# Patient Record
Sex: FEMALE | Race: WHITE | Employment: OTHER | URBAN - METROPOLITAN AREA
[De-identification: names, ages, dates, MRNs, and addresses within clinical notes are randomized per-mention and may not be internally consistent; named-entity substitution may affect disease eponyms.]

---

## 2017-03-13 ENCOUNTER — TRANSFERRED RECORDS (OUTPATIENT)
Dept: HEALTH INFORMATION MANAGEMENT | Facility: CLINIC | Age: 77
End: 2017-03-13

## 2017-03-13 LAB
CREAT SERPL-MCNC: 1.84 MG/DL (ref 0.57–1.11)
GFR SERPL CREATININE-BSD FRML MDRD: 27 ML/MIN/1.73M2
GLUCOSE SERPL-MCNC: 109 MG/DL (ref 70–95)
POTASSIUM SERPL-SCNC: 4.7 MMOL/L (ref 3.5–5.1)

## 2017-05-02 DIAGNOSIS — E78.5 HYPERLIPIDEMIA LDL GOAL <100: ICD-10-CM

## 2017-05-03 RX ORDER — SIMVASTATIN 40 MG
TABLET ORAL
Qty: 90 TABLET | Refills: 3 | Status: SHIPPED | OUTPATIENT
Start: 2017-05-03 | End: 2018-06-24

## 2017-05-31 DIAGNOSIS — I10 HYPERTENSION GOAL BP (BLOOD PRESSURE) < 140/90: ICD-10-CM

## 2017-05-31 RX ORDER — METOPROLOL SUCCINATE 25 MG/1
TABLET, EXTENDED RELEASE ORAL
Qty: 90 TABLET | Refills: 0 | Status: SHIPPED | OUTPATIENT
Start: 2017-05-31 | End: 2017-10-09

## 2017-05-31 NOTE — TELEPHONE ENCOUNTER
metoprolol (TOPROL-XL) 25 MG 24 hr tablet      Last Written Prescription Date: 8/1/16  Last Fill Quantity: 90, # refills: 3    Last Office Visit with [facility], [facility] or [facility] prescribing provider:  8/30/16   Future Office Visit:    Next 5 appointments (look out 90 days)     Jul 11, 2017 11:00 AM CDT   [facility]YS[facility] with [facility]   [facility]    [address] [place] [id number]   [phone]                    BP Readings from Last 3 Encounters:   08/30/16 122/68   06/28/16 134/72   06/23/15 128/68

## 2017-06-07 DIAGNOSIS — E03.9 HYPOTHYROIDISM, UNSPECIFIED TYPE: ICD-10-CM

## 2017-06-08 RX ORDER — LEVOTHYROXINE SODIUM 125 UG/1
TABLET ORAL
Qty: 85 TABLET | Refills: 0 | Status: SHIPPED | OUTPATIENT
Start: 2017-06-08 | End: 2017-10-16

## 2017-06-08 NOTE — TELEPHONE ENCOUNTER
levothyroxine (SYNTHROID, LEVOTHROID) 125 MCG tablet     Last Written Prescription Date: 9/6/16  Last Quantity: 90, # refills: 3  Last Office Visit with [facility], [facility] or [facility] prescribing provider: 8/30/16   Next 5 appointments (look out 90 days)     Jul 11, 2017 11:00 AM CDT   [facility]   [facility]    [address] [place] [id number] [phone]                   TSH   Date Value Ref Range Status   08/30/2016 1.06 0.40 - 5.00 mU/L Final

## 2017-06-13 DIAGNOSIS — G89.4 CHRONIC PAIN SYNDROME: Primary | ICD-10-CM

## 2017-06-13 NOTE — TELEPHONE ENCOUNTER
Tizanidine 2 mg      Last Written Prescription Date:  8/30/16  Last Fill Quantity: 360,   # refills: 3  Last Office Visit with [facility], [facility] or [facility] prescribing provider: 8/30/16  Future Office visit:    Next 5 appointments (look out 90 days)     Jul 11, 2017 11:00 AM CDT   [facility]    [address] [place] [id number] [phone]                   Routing refill request to provider for review/approval because:  Drug not on the [facility], [facility] or [facility] refill protocol or controlled substance

## 2017-06-14 RX ORDER — TIZANIDINE 2 MG/1
TABLET ORAL
Qty: 360 TABLET | Refills: 3 | Status: SHIPPED | OUTPATIENT
Start: 2017-06-14 | End: 2018-08-21

## 2017-07-12 ENCOUNTER — OFFICE VISIT (OUTPATIENT)
Dept: FAMILY MEDICINE | Facility: CLINIC | Age: 77
End: 2017-07-12
Payer: MEDICARE

## 2017-07-12 VITALS
RESPIRATION RATE: 20 BRPM | BODY MASS INDEX: 29.25 KG/M2 | HEART RATE: 80 BPM | TEMPERATURE: 97.8 F | HEIGHT: 66 IN | SYSTOLIC BLOOD PRESSURE: 124 MMHG | DIASTOLIC BLOOD PRESSURE: 62 MMHG | WEIGHT: 182 LBS | OXYGEN SATURATION: 100 %

## 2017-07-12 DIAGNOSIS — R42 LIGHTHEADEDNESS: ICD-10-CM

## 2017-07-12 DIAGNOSIS — E78.5 HYPERLIPIDEMIA LDL GOAL <100: ICD-10-CM

## 2017-07-12 DIAGNOSIS — M47.812 CERVICAL SPINE ARTHRITIS: ICD-10-CM

## 2017-07-12 DIAGNOSIS — B02.9 HERPES ZOSTER WITHOUT COMPLICATION: ICD-10-CM

## 2017-07-12 DIAGNOSIS — M10.9 PODAGRA: ICD-10-CM

## 2017-07-12 DIAGNOSIS — I10 HYPERTENSION GOAL BP (BLOOD PRESSURE) < 140/90: ICD-10-CM

## 2017-07-12 DIAGNOSIS — E03.8 OTHER SPECIFIED HYPOTHYROIDISM: ICD-10-CM

## 2017-07-12 DIAGNOSIS — J43.8 OTHER EMPHYSEMA (H): ICD-10-CM

## 2017-07-12 DIAGNOSIS — Z00.00 ROUTINE GENERAL MEDICAL EXAMINATION AT A HEALTH CARE FACILITY: Primary | ICD-10-CM

## 2017-07-12 DIAGNOSIS — N18.30 CHRONIC RENAL INSUFFICIENCY, STAGE 3 (MODERATE) (H): ICD-10-CM

## 2017-07-12 DIAGNOSIS — L02.512 ABSCESS OF LEFT THUMB: ICD-10-CM

## 2017-07-12 LAB
BASOPHILS # BLD AUTO: 0 10E9/L (ref 0–0.2)
BASOPHILS NFR BLD AUTO: 0.4 %
DIFFERENTIAL METHOD BLD: NORMAL
EOSINOPHIL # BLD AUTO: 0.4 10E9/L (ref 0–0.7)
EOSINOPHIL NFR BLD AUTO: 5.1 %
ERYTHROCYTE [DISTWIDTH] IN BLOOD BY AUTOMATED COUNT: 12.9 % (ref 10–15)
GRAM STN SPEC: NORMAL
HCT VFR BLD AUTO: 41.3 % (ref 35–47)
HGB BLD-MCNC: 13.8 G/DL (ref 11.7–15.7)
LYMPHOCYTES # BLD AUTO: 1.5 10E9/L (ref 0.8–5.3)
LYMPHOCYTES NFR BLD AUTO: 21.7 %
Lab: NORMAL
MCH RBC QN AUTO: 32.4 PG (ref 26.5–33)
MCHC RBC AUTO-ENTMCNC: 33.4 G/DL (ref 31.5–36.5)
MCV RBC AUTO: 97 FL (ref 78–100)
MICRO REPORT STATUS: NORMAL
MONOCYTES # BLD AUTO: 0.7 10E9/L (ref 0–1.3)
MONOCYTES NFR BLD AUTO: 9.7 %
NEUTROPHILS # BLD AUTO: 4.3 10E9/L (ref 1.6–8.3)
NEUTROPHILS NFR BLD AUTO: 63.1 %
PLATELET # BLD AUTO: 182 10E9/L (ref 150–450)
RBC # BLD AUTO: 4.26 10E12/L (ref 3.8–5.2)
SPECIMEN SOURCE: NORMAL
WBC # BLD AUTO: 6.8 10E9/L (ref 4–11)

## 2017-07-12 PROCEDURE — 87070 CULTURE OTHR SPECIMN AEROBIC: CPT | Performed by: INTERNAL MEDICINE

## 2017-07-12 PROCEDURE — 80050 GENERAL HEALTH PANEL: CPT | Performed by: INTERNAL MEDICINE

## 2017-07-12 PROCEDURE — 87205 SMEAR GRAM STAIN: CPT | Performed by: INTERNAL MEDICINE

## 2017-07-12 PROCEDURE — 80061 LIPID PANEL: CPT | Performed by: INTERNAL MEDICINE

## 2017-07-12 PROCEDURE — 99213 OFFICE O/P EST LOW 20 MIN: CPT | Mod: 25 | Performed by: INTERNAL MEDICINE

## 2017-07-12 PROCEDURE — 87077 CULTURE AEROBIC IDENTIFY: CPT | Performed by: INTERNAL MEDICINE

## 2017-07-12 PROCEDURE — 36415 COLL VENOUS BLD VENIPUNCTURE: CPT | Performed by: INTERNAL MEDICINE

## 2017-07-12 PROCEDURE — 99397 PER PM REEVAL EST PAT 65+ YR: CPT | Performed by: INTERNAL MEDICINE

## 2017-07-12 RX ORDER — CEPHALEXIN 500 MG/1
500 CAPSULE ORAL 2 TIMES DAILY
Qty: 14 CAPSULE | Refills: 0 | Status: SHIPPED | OUTPATIENT
Start: 2017-07-12 | End: 2018-08-21

## 2017-07-12 RX ORDER — FAMCICLOVIR 500 MG/1
500 TABLET ORAL 2 TIMES DAILY
Qty: 14 TABLET | Refills: 3 | Status: SHIPPED | OUTPATIENT
Start: 2017-07-12 | End: 2018-01-24

## 2017-07-12 RX ORDER — ALBUTEROL SULFATE 90 UG/1
2 AEROSOL, METERED RESPIRATORY (INHALATION) EVERY 6 HOURS PRN
Qty: 1 INHALER | Refills: 5 | Status: SHIPPED | OUTPATIENT
Start: 2017-07-12 | End: 2018-08-21

## 2017-07-12 RX ORDER — PREDNISONE 20 MG/1
20 TABLET ORAL 2 TIMES DAILY
Qty: 10 TABLET | Refills: 3 | Status: SHIPPED | OUTPATIENT
Start: 2017-07-12 | End: 2018-05-08

## 2017-07-12 NOTE — PROGRESS NOTES
Answers for HPI/ROS submitted by the patient on 7/9/2017   Annual Exam:  Getting at least 3 servings of Calcium per day:: NO  Bi-annual eye exam:: Yes  Dental care twice a year:: NO  Sleep apnea or symptoms of sleep apnea:: None  Diet:: Low salt  Frequency of exercise:: 2-3 days/week  Taking medications regularly:: Yes  Medication side effects:: None  Additional concerns today:: YES  PHQ-2 Score: 1  Duration of exercise:: Less than 15 minutes      SUBJECTIVE:   Marina Cummins is a 77 year old female who presents for Preventive Visit.      Are you in the first 12 months of your Medicare Part B coverage?  No      COGNITIVE SCREEN  1) Repeat 3 items (Banana, Sunrise, Chair)    2) Clock draw: NORMAL  3) 3 item recall: Recalls 3 objects  Results: 3 items recalled: COGNITIVE IMPAIRMENT LESS LIKELY    Mini-CogTM Copyright S Yared. Licensed by the author for use in [facility]; reprinted with permission (jon@[facility]). All rights reserved.            Has multiple medication questions, mo. Famvir med. Needs written refills today on some meds as well.                       Living full time in [place] again.                Busy;active.                Had a sliver L thumb a few wks ago; wood deck.              She developed an abscess which drained spontaneously.         Not there is a recurrent area of swelling and some pain.                                                                                                                                                                                                  occas lightheadedness even while sitting.         Not orthostatic or vertiginous.              Not assoc with low BP.        Does take tizanidine. Has ongoing neck pain.                             Some weakness of legs with walking; more than claudication.           She sees renal yearly.                                Reviewed and updated as needed this visit by clinical staff  Tobacco   Allergies  Meds  Med Hx  Surg Hx  Fam Hx  Soc Hx        Reviewed and updated as needed this visit by Provider            The patient does not drink >3 drinks per day nor >7 drinks per week.    Today's PHQ-2 Score:   PHQ-2 ( 1999 [employer]) 7/12/2017 7/9/2017   Q1: Little interest or pleasure in doing things 0 0   Q2: Feeling down, depressed or hopeless 0 1   PHQ-2 Score 0 1   Q1: Little interest or pleasure in doing things - Not at all   Q2: Feeling down, depressed or hopeless - Several days   PHQ-2 Score - 1       Do you feel safe in your environment - Yes    Do you have a Health Care Directive?: Yes: Patient states has Advance Directive and will bring in a copy to clinic.    Current providers sharing in care for this patient include:   Patient Care Team:  Cody Obando MD as PCP - General (Internal Medicine)      Hearing impairment: No    Ability to successfully perform activities of daily living: Yes, no assistance needed     Fall risk:  Fallen 2 or more times in the past year?: No  Any fall with injury in the past year?: No    Home safety:  none identified      The following health maintenance items are reviewed in Epic and correct as of today:  Health Maintenance   Topic Date Due     CARLEE QUESTIONNAIRE 1 YEAR  1940     PHQ-9 Q1YR  1940     URINE DRUG SCREEN Q1 YR  06/22/1953     MEDICARE ANNUAL WELLNESS VISIT  06/22/1956     FALL RISK ASSESSMENT  06/28/2017     INFLUENZA VACCINE (SYSTEM ASSIGNED)  09/01/2017     LIPID SCREEN Q5 YR FEMALE (SYSTEM ASSIGNED)  06/28/2021     ADVANCE DIRECTIVE PLANNING Q5 YRS  06/28/2021     TETANUS IMMUNIZATION (SYSTEM ASSIGNED)  06/23/2025     DEXA SCAN SCREENING (SYSTEM ASSIGNED)  Completed     PNEUMOCOCCAL  Completed     Patient Active Problem List    Diagnosis Date Noted     Personal history of tobacco use, presenting hazards to health 06/26/2016     Priority: High     Quit in 19__;            Pack yrs?       Hypertension goal BP (blood pressure) < 140/90  "01/09/2015     Priority: High     Chronic renal insufficiency 06/17/2014     Priority: High     Peripheral vascular disease (H) 05/15/2013     Priority: High     MELANIE 1.1 bilat in 6/13; stable  Problem list name updated by automated process. Provider to review       Hypothyroidism 05/15/2013     Priority: High     Hyperlipidemia LDL goal <100 05/15/2013     Priority: High     Cor angio approx 11/09; \"minimal disease\"      Lipids were good at renal; see 6/14 note       Chronic pain syndrome 05/17/2016     Priority: Medium     Podagra 07/14/2015     Priority: Medium     Hand joint pain 04/07/2015     Priority: Medium     Bee sting reaction 06/26/2014     Priority: Medium     Osteopenia 06/17/2014     Priority: Medium     Mild; in 2009, T-1.6 R, -0.4 spine       Back pain 12/31/2012     Priority: Medium     COPD (chronic obstructive pulmonary disease) (H) 06/06/2012     Priority: Medium     [facility]       ACP (advance care planning) 06/28/2016     Priority: Low     Advance Care Planning 6/28/2016: Receipt of ACP document:  Received: Health Care Directive which was witnessed or notarized on 11/27/11.  Document not previously scanned.  Validation form completed and sent with document to be scanned.  Code Status needs to be updated to reflect choices in most recent ACP document. Notification sent to Dr. Cody Obando  for followup.  Confirmed/documented designated decision maker(s).  Added by Nevaeh Mckinnon             Preventive measure 05/15/2013     Priority: Low     [facility]  Colonoscopy approx 2005; pt declines another         S/p bilateral mastectomy; s/p hysterectomy         Past Surgical History:   Procedure Laterality Date     AORTO BI ILIAC BYPASS Bilateral 1999     APPENDECTOMY       CHOLECYSTECTOMY       HYSTERECTOMY, PAP NO LONGER INDICATED  1976    no oophorectomy     JOINT REPLACEMENT, HIP RT/LT Bilateral     Joint Replacement Hip RT/LT     MASTECTOMY, " "SIMPLE RT/LT/DARLENE Bilateral 1974    fibrocystic disease     SPINE SURGERY  1972,1982     THYROIDECTOMY       goiter     Social History     Social History     Marital status:      Spouse name:  4/14     Number of children: 2     Years of education: N/A     Occupational History      Retired     Social History Main Topics     Smoking status: Former Smoker     Quit date: 5/15/1998     Smokeless tobacco: Never Used     Alcohol use Yes      Comment: occ.     Drug use: No     Sexual activity: No     Other Topics Concern     Parent/Sibling W/ Cabg, Mi Or Angioplasty Before 65f 55m? Yes     Social History Narrative    Lives in [place] full time since the 1990's     Family History   Problem Relation Age of Onset     DIABETES Paternal Grandmother      DIABETES Sister      DIABETES Other          BP Readings from Last 3 Encounters:   07/12/17 124/62   08/30/16 122/68   06/28/16 134/72    Wt Readings from Last 3 Encounters:   07/12/17 182 lb (82.6 kg)   08/30/16 185 lb (83.9 kg)   06/28/16 185 lb (83.9 kg)                  Current Outpatient Prescriptions   Medication Sig Dispense Refill     tiZANidine (ZANAFLEX) 2 MG tablet TAKE 1 TABLET THREE TIMES DAILY AND AN EXTRA DOSE AT BEDTIME 360 tablet 3     levothyroxine (SYNTHROID/LEVOTHROID) 125 MCG tablet TAKE 1 TABLET  6  DAYS  A  WEEK  AND TAKE 1/2 TABLET  ON  SEVENTH DAY 85 tablet 0     clopidogrel (PLAVIX) 75 MG tablet TAKE 1 TABLET EVERY DAY 90 tablet 2     metoprolol (TOPROL-XL) 25 MG 24 hr tablet TAKE 1 TABLET EVERY DAY 90 tablet 0     simvastatin (ZOCOR) 40 MG tablet TAKE 1 TABLET EVERY DAY 90 tablet 3     spironolactone (ALDACTONE) 25 MG tablet Take 25 mg by mouth daily  90 tablet 3     predniSONE (DELTASONE) 20 MG tablet Take 1 tablet (20 mg) by mouth 2 times daily Only takes when \"flare-up\" with Gout 10 tablet 3     diclofenac (VOLTAREN) 1 % GEL Apply 2 grams to hands four times daily prn using enclosed dosing card. 100 g 5     desonide (DESOWEN) 0.05 % " "cream        ketoconazole (NIZORAL) 2 % cream        furosemide (LASIX) 20 MG tablet 40 mg AM, 20 mg  tablet 1     albuterol (PROAIR HFA, PROVENTIL HFA, VENTOLIN HFA) 108 (90 BASE) MCG/ACT inhaler Inhale 2 puffs into the lungs every 6 hours as needed for shortness of breath / dyspnea or wheezing 1 Inhaler 0     famciclovir (FAMVIR) 500 MG tablet Take 1 tablet (500 mg) by mouth 2 times daily 180 tablet 3     EPINEPHrine (EPIPEN) 0.3 MG/0.3ML injection Inject 0.3 mLs (0.3 mg) into the muscle once as needed for anaphylaxis 1 each 11     multivitamin  with lutein (OCUVITE WITH LTEIN) CAPS Take 1 capsule by mouth daily       calcitRIOL (ROCALTROL) 0.25 MCG capsule Take 1 capsule by mouth every other day. 90 capsule 1     Allergies   Allergen Reactions     Aspirin      Gabapentin      Confusion     Sulfa Drugs      Tramadol      Nausea and Vomiting       Zithromax [Azithromycin Dihydrate]      Diarrhea.      Zovirax      Mood swings. Suicidal.           ROS:  C: NEGATIVE for fever, chills, change in weight  E: NEGATIVE for vision changes or irritation  E/M: NEGATIVE for ear, mouth and throat problems  R: NEGATIVE for significant cough or SOB  CV: NEGATIVE for chest pain/chest pressure and palpitations  GI: NEGATIVE for hematochezia and melena  : NEGATIVE for frequency, dysuria, or hematuria  MUSCULOSKELETAL:POSITIVE  for neck pain  NEURO: NEGATIVE for vertigo  H: NEGATIVE for bleeding problems  PSYCHIATRIC: POSITIVE for occas,brief depressed mood    OBJECTIVE:   /62 (BP Location: Left arm, Patient Position: Chair, Cuff Size: Adult Large)  Pulse 80  Temp 97.8  F (36.6  C)  Resp 20  Ht 5' 5.5\" (1.664 m)  Wt 182 lb (82.6 kg)  SpO2 100%  Breastfeeding? No  BMI 29.83 kg/m2 Estimated body mass index is 29.83 kg/(m^2) as calculated from the following:    Height as of this encounter: 5' 5.5\" (1.664 m).    Weight as of this encounter: 182 lb (82.6 kg).  EXAM:   GENERAL APPEARANCE: healthy, alert and no " "distress  EYES: Eyes grossly normal to inspection  HENT: ear canals and TM's normal, nose and mouth without ulcers or lesions, oropharynx clear and oral mucous membranes moist  NECK: no adenopathy, no asymmetry, masses, or scars and thyroid normal to palpation  RESP: lungs clear to auscultation - no rales, rhonchi or wheezes  BREAST: both breasts are absent  CV: regular rates and rhythm, normal S1 S2, no S3 or S4, no murmur, click or rub and varicosities with trace edema  ABDOMEN: soft, nontender, no hepatosplenomegaly and no masses  MS: neck exam reveals decr ROM  SKIN: abscess flexor mid thumb; incised and drained; fluid sent for culture  NEURO: Normal strength and tone, sensory exam grossly normal, mentation intact and speech normal  PSYCH: mentation appears normal and affect normal/bright    ASSESSMENT / PLAN:   Marina was seen today for physical.    Diagnoses and all orders for this visit:    Routine general medical examination at a health care facility    Abscess of left thumb  -     cephALEXin (KEFLEX) 500 MG capsule; Take 1 capsule (500 mg) by mouth 2 times daily  -     Wound Culture Aerobic Bacterial  -     Gram stain  -     CBC with platelets and differential    Other specified hypothyroidism  -     TSH with free T4 reflex  -     CBC with platelets and differential    Hyperlipidemia LDL goal <100  -     Comprehensive metabolic panel (BMP + Alb, Alk Phos, ALT, AST, Total. Bili, TP)  -     Lipid Profile (Chol, Trig, HDL, LDL calc)    Hypertension goal BP (blood pressure) < 140/90  -     Comprehensive metabolic panel (BMP + Alb, Alk Phos, ALT, AST, Total. Bili, TP)    Chronic renal insufficiency, stage 3 (moderate)    Lightheadedness    Podagra  -     predniSONE (DELTASONE) 20 MG tablet; Take 1 tablet (20 mg) by mouth 2 times daily Only takes when \"flare-up\" with Gout    Cervical spine arthritis (H)    Other emphysema (H)  -     albuterol (PROAIR HFA/PROVENTIL HFA/VENTOLIN HFA) 108 (90 BASE) MCG/ACT Inhaler; " "Inhale 2 puffs into the lungs every 6 hours as needed for shortness of breath / dyspnea or wheezing    Herpes zoster without complication  -     famciclovir (FAMVIR) 500 MG tablet; Take 1 tablet (500 mg) by mouth 2 times daily                  Summary and implications:  Multiple issues.           Adjust antibiotics based on culture results.  If this abscess recurs, see a surgeon for exploration.                      Patient Instructions     I will mail to you the lab results.                            Try not taking the morning tizanidine; perhaps this is contributing to lightheadedness.                              End of Life Planning:  Patient currently has an advanced directive: Yes.  Practitioner is supportive of decision.    COUNSELING:  Reviewed preventive health counseling, as reflected in patient instructions        Estimated body mass index is 29.83 kg/(m^2) as calculated from the following:    Height as of this encounter: 5' 5.5\" (1.664 m).    Weight as of this encounter: 182 lb (82.6 kg).     reports that she quit smoking about 19 years ago. She has never used smokeless tobacco.      Appropriate preventive services were discussed with this patient, including applicable screening as appropriate for cardiovascular disease, diabetes, osteopenia/osteoporosis, and glaucoma.  As appropriate for age/gender, discussed screening for colorectal cancer, prostate cancer, breast cancer, and cervical cancer. Checklist reviewing preventive services available has been given to the patient.    Reviewed patients plan of care and provided an AVS. The Basic Care Plan (routine screening as documented in Health Maintenance) for Marina meets the Care Plan requirement. This Care Plan has been established and reviewed with the Patient.    Counseling Resources:  ATP IV Guidelines  Pooled Cohorts Equation Calculator  Breast Cancer Risk Calculator  FRAX Risk Assessment  ICSI Preventive Guidelines  Dietary Guidelines for Americans, " 2010  [employer]'s MyPlate  ASA Prophylaxis  Lung CA Screening    Cody Obando MD  [facility]   Results for orders placed or performed in visit on 07/12/17   Comprehensive metabolic panel (BMP + Alb, Alk Phos, ALT, AST, Total. Bili, TP)   Result Value Ref Range    Sodium 140 133 - 144 mmol/L    Potassium 3.8 3.4 - 5.3 mmol/L    Chloride 103 94 - 109 mmol/L    Carbon Dioxide 23 20 - 32 mmol/L    Anion Gap 14 3 - 14 mmol/L    Glucose 102 (H) 70 - 99 mg/dL    Urea Nitrogen 54 (H) 7 - 30 mg/dL    Creatinine 2.03 (H) 0.52 - 1.04 mg/dL    GFR Estimate 24 (L) >60 mL/min/1.7m2    GFR Estimate If Black 29 (L) >60 mL/min/1.7m2    Calcium 9.2 8.5 - 10.1 mg/dL    Bilirubin Total 0.6 0.2 - 1.3 mg/dL    Albumin 4.4 3.4 - 5.0 g/dL    Protein Total 8.0 6.8 - 8.8 g/dL    Alkaline Phosphatase 96 40 - 150 U/L    ALT 23 0 - 50 U/L    AST 20 0 - 45 U/L   TSH with free T4 reflex   Result Value Ref Range    TSH 2.39 0.40 - 4.00 mU/L   CBC with platelets and differential   Result Value Ref Range    WBC 6.8 4.0 - 11.0 10e9/L    RBC Count 4.26 3.8 - 5.2 10e12/L    Hemoglobin 13.8 11.7 - 15.7 g/dL    Hematocrit 41.3 35.0 - 47.0 %    MCV 97 78 - 100 fl    MCH 32.4 26.5 - 33.0 pg    MCHC 33.4 31.5 - 36.5 g/dL    RDW 12.9 10.0 - 15.0 %    Platelet Count 182 150 - 450 10e9/L    Diff Method Automated Method     % Neutrophils 63.1 %    % Lymphocytes 21.7 %    % Monocytes 9.7 %    % Eosinophils 5.1 %    % Basophils 0.4 %    Absolute Neutrophil 4.3 1.6 - 8.3 10e9/L    Absolute Lymphocytes 1.5 0.8 - 5.3 10e9/L    Absolute Monocytes 0.7 0.0 - 1.3 10e9/L    Absolute Eosinophils 0.4 0.0 - 0.7 10e9/L    Absolute Basophils 0.0 0.0 - 0.2 10e9/L   Lipid Profile (Chol, Trig, HDL, LDL calc)   Result Value Ref Range    Cholesterol 165 <200 mg/dL    Triglycerides 145 <150 mg/dL    HDL Cholesterol 50 >49 mg/dL    LDL Cholesterol Calculated 86 <100 mg/dL    Non HDL Cholesterol 115 <130 mg/dL   Wound Culture Aerobic Bacterial   Result  Value Ref Range    Specimen Description Wound Left thumb     Special Requests Specimen collected in eSwab transport (blue cap)     Culture Micro Culture in progress     Micro Report Status Pending    Gram stain   Result Value Ref Range    Specimen Description Wound Left Thumb     Special Requests Specimen collected in eSwab transport (blue cap)     Gram Stain       No organisms seen  Moderate WBC'S seen predominantly PMN's      Micro Report Status FINAL 07/12/2017      My chart message sent.       Your lab results are stable. Keep taking the same medications.

## 2017-07-12 NOTE — MR AVS SNAPSHOT
After Visit Summary   7/12/2017    Marina Cummins    MRN: [id number]           Patient Information     Date Of Birth          1940        Visit Information        Provider Department      7/12/2017 11:30 AM Cody Obando MD [facility]        Today's Diagnoses     Routine general medical examination at a health care facility    -  1    Abscess of left thumb        Other specified hypothyroidism        Hyperlipidemia LDL goal <100        Hypertension goal BP (blood pressure) < 140/90        Chronic renal insufficiency, stage 3 (moderate)        Lightheadedness        Podagra        Cervical spine arthritis (H)        Other emphysema (H)        Herpes zoster without complication          Care Instructions      I will mail to you the lab results.                            Try not taking the morning tizanidine; perhaps this is contributing to lightheadedness.                                Follow-ups after your visit        Who to contact     If you have questions or need follow up information about today's clinic visit or your schedule please contact [facility] directly at [phone].  Normal or non-critical lab and imaging results will be communicated to you by [employer], letter or phone within 4 business days after the clinic has received the results. If you do not hear from us within 7 days, please contact the clinic through [employer] or phone. If you have a critical or abnormal lab result, we will notify you by phone as soon as possible.  Submit refill requests through [employer]life or call your pharmacy and they will forward the refill request to us. Please allow 3 business days for your refill to be completed.          Additional Information About Your Visit        MyChart Information     Daylife gives you secure access to your electronic health record. If you see a primary care provider, you can also send messages to your care  "team and make appointments. If you have questions, please call your primary care clinic.  If you do not have a primary care provider, please call [phone] and they will assist you.        Care EveryWhere ID     This is your Care EveryWhere ID. This could be used by other organizations to access your [facility] medical records  [id number]        Your Vitals Were     Pulse Temperature Respirations Height Pulse Oximetry Breastfeeding?    80 97.8  F (36.6  C) 20 5' 5.5\" (1.664 m) 100% No    BMI (Body Mass Index)                   29.83 kg/m2            Blood Pressure from Last 3 Encounters:   07/12/17 124/62   08/30/16 122/68   06/28/16 134/72    Weight from Last 3 Encounters:   07/12/17 182 lb (82.6 kg)   08/30/16 185 lb (83.9 kg)   06/28/16 185 lb (83.9 kg)              We Performed the Following     CBC with platelets and differential     Comprehensive metabolic panel (BMP + Alb, Alk Phos, ALT, AST, Total. Bili, TP)     Gram stain     Lipid Profile (Chol, Trig, HDL, LDL calc)     TSH with free T4 reflex     Wound Culture Aerobic Bacterial          Today's Medication Changes          These changes are accurate as of: 7/12/17 12:24 PM.  If you have any questions, ask your nurse or doctor.               Start taking these medicines.        Dose/Directions    cephALEXin 500 MG capsule   Commonly known as:  KEFLEX   Used for:  Abscess of left thumb   Started by:  Cody Obando MD        Dose:  500 mg   Take 1 capsule (500 mg) by mouth 2 times daily   Quantity:  14 capsule   Refills:  0            Where to get your medicines      These medications were sent to [facility] - [place], [place] - [address]  [address] [address]     Phone:  [phone]     famciclovir 500 MG tablet         These medications were sent to [place], [place] - [address]  [address] [address]     Phone:  [phone]     cephALEXin 500 MG capsule         Some of " these will need a paper prescription and others can be bought over the counter.  Ask your nurse if you have questions.     Bring a paper prescription for each of these medications     albuterol 108 (90 BASE) MCG/ACT Inhaler    predniSONE 20 MG tablet                Primary Care Provider Office Phone # Fax #    Cody Obando [phone]       [facility] [address]  [facility] [address]        Equal Access to Services     KRYS BRUNNER : Hadii aad ku hadasho Soomaali, waaxda luqadaha, qaybta kaalmada adeegyada, waxay idiin hayaan adeeg kharash la'aan . So [facility] [phone].    ATENCIÓN: Si habla español, tiene a bolaños disposición servicios gratuitos de asistencia lingüística. [facility] [phone].    We comply with applicable federal civil rights laws and [place] laws. We do not discriminate on the basis of race, color, national origin, age, disability sex, sexual orientation or gender identity.            Thank you!     Thank you for choosing [facility]  for your care. Our goal is always to provide you with excellent care. Hearing back from our patients is one way we can continue to improve our services. Please take a few minutes to complete the written survey that you may receive in the mail after your visit with us. Thank you!             Your Updated Medication List - Protect others around you: Learn how to safely use, store and throw away your medicines at www.disposemymeds.org.          This list is accurate as of: 7/12/17 12:24 PM.  Always use your most recent med list.                   Brand Name Dispense Instructions for use Diagnosis    albuterol 108 (90 BASE) MCG/ACT Inhaler    PROAIR HFA/PROVENTIL HFA/VENTOLIN HFA    1 Inhaler    Inhale 2 puffs into the lungs every 6 hours as needed for shortness of breath / dyspnea or wheezing    Other emphysema (H)       calcitRIOL 0.25 MCG capsule    ROCALTROL    90 capsule    Take 1 capsule by  "mouth every other day.        cephALEXin 500 MG capsule    KEFLEX    14 capsule    Take 1 capsule (500 mg) by mouth 2 times daily    Abscess of left thumb       clopidogrel 75 MG tablet    PLAVIX    90 tablet    TAKE 1 TABLET EVERY DAY    Peripheral vascular disease, unspecified (H)       desonide 0.05 % cream    DESOWEN          diclofenac 1 % Gel topical gel    VOLTAREN    100 g    Apply 2 grams to hands four times daily prn using enclosed dosing card.    Hand joint pain, unspecified laterality       EPINEPHrine 0.3 MG/0.3ML injection     1 each    Inject 0.3 mLs (0.3 mg) into the muscle once as needed for anaphylaxis    Bee sting reaction, initial encounter       famciclovir 500 MG tablet    FAMVIR    14 tablet    Take 1 tablet (500 mg) by mouth 2 times daily    Herpes zoster without complication       ketoconazole 2 % cream    NIZORAL          LASIX 20 MG tablet   Generic drug:  furosemide     360 tablet    40 mg AM, 20 mg PM        levothyroxine 125 MCG tablet    SYNTHROID/LEVOTHROID    85 tablet    TAKE 1 TABLET  6  DAYS  A  WEEK  AND TAKE 1/2 TABLET  ON  SEVENTH DAY    Hypothyroidism, unspecified type       metoprolol 25 MG 24 hr tablet    TOPROL-XL    90 tablet    TAKE 1 TABLET EVERY DAY    Hypertension goal BP (blood pressure) < 140/90       multivitamin  with lutein Caps per capsule      Take 1 capsule by mouth daily        predniSONE 20 MG tablet    DELTASONE    10 tablet    Take 1 tablet (20 mg) by mouth 2 times daily Only takes when \"flare-up\" with Gout    Podagra       simvastatin 40 MG tablet    ZOCOR    90 tablet    TAKE 1 TABLET EVERY DAY    Hyperlipidemia LDL goal <100       spironolactone 25 MG tablet    ALDACTONE    90 tablet    Take 25 mg by mouth daily        tiZANidine 2 MG tablet    ZANAFLEX    360 tablet    TAKE 1 TABLET THREE TIMES DAILY AND AN EXTRA DOSE AT BEDTIME    Chronic pain syndrome         "

## 2017-07-12 NOTE — NURSING NOTE
"Chief Complaint   Patient presents with     Physical       Initial /62 (BP Location: Left arm, Patient Position: Chair, Cuff Size: Adult Large)  Pulse 80  Temp 97.8  F (36.6  C)  Resp 20  Ht 5' 5.5\" (1.664 m)  Wt 182 lb (82.6 kg)  SpO2 100%  Breastfeeding? No  BMI 29.83 kg/m2 Estimated body mass index is 29.83 kg/(m^2) as calculated from the following:    Height as of this encounter: 5' 5.5\" (1.664 m).    Weight as of this encounter: 182 lb (82.6 kg).  Medication Reconciliation: complete   Monica Aguero LPN  "

## 2017-07-12 NOTE — PATIENT INSTRUCTIONS
I will mail to you the lab results.                            Try not taking the morning tizanidine; perhaps this is contributing to lightheadedness.

## 2017-07-13 LAB
ALBUMIN SERPL-MCNC: 4.4 G/DL (ref 3.4–5)
ALP SERPL-CCNC: 96 U/L (ref 40–150)
ALT SERPL W P-5'-P-CCNC: 23 U/L (ref 0–50)
ANION GAP SERPL CALCULATED.3IONS-SCNC: 14 MMOL/L (ref 3–14)
AST SERPL W P-5'-P-CCNC: 20 U/L (ref 0–45)
BILIRUB SERPL-MCNC: 0.6 MG/DL (ref 0.2–1.3)
BUN SERPL-MCNC: 54 MG/DL (ref 7–30)
CALCIUM SERPL-MCNC: 9.2 MG/DL (ref 8.5–10.1)
CHLORIDE SERPL-SCNC: 103 MMOL/L (ref 94–109)
CHOLEST SERPL-MCNC: 165 MG/DL
CO2 SERPL-SCNC: 23 MMOL/L (ref 20–32)
CREAT SERPL-MCNC: 2.03 MG/DL (ref 0.52–1.04)
GFR SERPL CREATININE-BSD FRML MDRD: 24 ML/MIN/1.7M2
GLUCOSE SERPL-MCNC: 102 MG/DL (ref 70–99)
HDLC SERPL-MCNC: 50 MG/DL
LDLC SERPL CALC-MCNC: 86 MG/DL
NONHDLC SERPL-MCNC: 115 MG/DL
POTASSIUM SERPL-SCNC: 3.8 MMOL/L (ref 3.4–5.3)
PROT SERPL-MCNC: 8 G/DL (ref 6.8–8.8)
SODIUM SERPL-SCNC: 140 MMOL/L (ref 133–144)
TRIGL SERPL-MCNC: 145 MG/DL
TSH SERPL DL<=0.005 MIU/L-ACNC: 2.39 MU/L (ref 0.4–4)

## 2017-07-14 LAB
BACTERIA SPEC CULT: ABNORMAL
Lab: ABNORMAL
MICRO REPORT STATUS: ABNORMAL
SPECIMEN SOURCE: ABNORMAL

## 2017-07-17 ENCOUNTER — TELEPHONE (OUTPATIENT)
Dept: FAMILY MEDICINE | Facility: CLINIC | Age: 77
End: 2017-07-17

## 2017-07-17 NOTE — TELEPHONE ENCOUNTER
FYI-  Called [facility] pharmacy with providers response. They just needed provider approval to fill.  Verbal approval given.

## 2017-07-17 NOTE — TELEPHONE ENCOUNTER
She's been on it since 2013 so looks like it should be okay.  I see no medications pended, was pharmacy just wondering?

## 2017-07-17 NOTE — TELEPHONE ENCOUNTER
famciclovir (FAMVIR) 500 MG tablet  Patient reports zovirax allergy and there is a potential for cross sensitivity.  Please confirm if ok to fill this medication. Yes or no

## 2017-07-25 ENCOUNTER — MYC MEDICAL ADVICE (OUTPATIENT)
Dept: FAMILY MEDICINE | Facility: CLINIC | Age: 77
End: 2017-07-25

## 2017-07-25 NOTE — TELEPHONE ENCOUNTER
I sent to her a my chart message regarding her labs, soon after her July 12 visit.            She wants a paper copy of the labs mailed to her; please go ahead.

## 2017-07-25 NOTE — LETTER
[facility]  [address]  [facility] [id number] [phone]                                                                                                           Marina Patiño [facility]  [address] [facility] [address]    July 25, 2017      Dear Marina,    Your lab results are stable. Keep taking the same medications.       Results for orders placed or performed in visit on 07/12/17   Comprehensive metabolic panel (BMP + Alb, Alk Phos, ALT, AST, Total. Bili, TP)   Result Value Ref Range    Sodium 140 133 - 144 mmol/L    Potassium 3.8 3.4 - 5.3 mmol/L    Chloride 103 94 - 109 mmol/L    Carbon Dioxide 23 20 - 32 mmol/L    Anion Gap 14 3 - 14 mmol/L    Glucose 102 (H) 70 - 99 mg/dL    Urea Nitrogen 54 (H) 7 - 30 mg/dL    Creatinine 2.03 (H) 0.52 - 1.04 mg/dL    GFR Estimate 24 (L) >60 mL/min/1.7m2    GFR Estimate If Black 29 (L) >60 mL/min/1.7m2    Calcium 9.2 8.5 - 10.1 mg/dL    Bilirubin Total 0.6 0.2 - 1.3 mg/dL    Albumin 4.4 3.4 - 5.0 g/dL    Protein Total 8.0 6.8 - 8.8 g/dL    Alkaline Phosphatase 96 40 - 150 U/L    ALT 23 0 - 50 U/L    AST 20 0 - 45 U/L   TSH with free T4 reflex   Result Value Ref Range    TSH 2.39 0.40 - 4.00 mU/L   CBC with platelets and differential   Result Value Ref Range    WBC 6.8 4.0 - 11.0 10e9/L    RBC Count 4.26 3.8 - 5.2 10e12/L    Hemoglobin 13.8 11.7 - 15.7 g/dL    Hematocrit 41.3 35.0 - 47.0 %    MCV 97 78 - 100 fl    MCH 32.4 26.5 - 33.0 pg    MCHC 33.4 31.5 - 36.5 g/dL    RDW 12.9 10.0 - 15.0 %    Platelet Count 182 150 - 450 10e9/L    Diff Method Automated Method     % Neutrophils 63.1 %    % Lymphocytes 21.7 %    % Monocytes 9.7 %    % Eosinophils 5.1 %    % Basophils 0.4 %    Absolute Neutrophil 4.3 1.6 - 8.3 10e9/L    Absolute Lymphocytes 1.5 0.8 - 5.3 10e9/L    Absolute Monocytes 0.7 0.0 - 1.3 10e9/L    Absolute Eosinophils 0.4 0.0 - 0.7 10e9/L    Absolute Basophils 0.0 0.0 - 0.2 10e9/L   Lipid  Profile (Chol, Trig, HDL, LDL calc)   Result Value Ref Range    Cholesterol 165 <200 mg/dL    Triglycerides 145 <150 mg/dL    HDL Cholesterol 50 >49 mg/dL    LDL Cholesterol Calculated 86 <100 mg/dL    Non HDL Cholesterol 115 <130 mg/dL   Wound Culture Aerobic Bacterial   Result Value Ref Range    Specimen Description Wound Left thumb     Special Requests Specimen collected in eSwab transport (blue cap)     Culture Micro (A)      Light growth Bacillus species, not anthracis nor cereus group Identification   obtained by MALDI-TOF mass spectrometry research use only database. Test   characteristics determined and verified by the Infectious Diseases Diagnostic   Laboratory ([facility]) [place], [place].  Susceptibility testing not routinely done      Micro Report Status FINAL 07/14/2017    Gram stain   Result Value Ref Range    Specimen Description Wound Left Thumb     Special Requests Specimen collected in eSwab transport (blue cap)     Gram Stain       No organisms seen  Moderate WBC'S seen predominantly PMN's      Micro Report Status FINAL 07/12/2017                    Thank you for choosing [facility].  We appreciate the opportunity to serve you and look forward to supporting your healthcare needs in the future.    If you have any questions or concerns, please call me or my staff at [phone].      Sincerely,    Cody Obando MD

## 2017-09-13 ENCOUNTER — TRANSFERRED RECORDS (OUTPATIENT)
Dept: HEALTH INFORMATION MANAGEMENT | Facility: CLINIC | Age: 77
End: 2017-09-13

## 2017-10-09 DIAGNOSIS — I10 HYPERTENSION GOAL BP (BLOOD PRESSURE) < 140/90: ICD-10-CM

## 2017-10-10 RX ORDER — METOPROLOL SUCCINATE 25 MG/1
TABLET, EXTENDED RELEASE ORAL
Qty: 90 TABLET | Refills: 2 | Status: SHIPPED | OUTPATIENT
Start: 2017-10-10 | End: 2018-04-18

## 2017-10-10 NOTE — TELEPHONE ENCOUNTER
metoprolol (TOPROL-XL) 25 MG 24 hr tablet      Last Written Prescription Date: 5/31/17  Last Fill Quantity: 90, # refills: 0    Last Office Visit with [facility], [facility] or [facility] prescribing provider:  7/12/17   Future Office Visit:        BP Readings from Last 3 Encounters:   07/12/17 124/62   08/30/16 122/68   06/28/16 134/72

## 2017-10-16 DIAGNOSIS — E03.9 HYPOTHYROIDISM, UNSPECIFIED TYPE: ICD-10-CM

## 2017-10-17 RX ORDER — LEVOTHYROXINE SODIUM 125 UG/1
TABLET ORAL
Qty: 85 TABLET | Refills: 2 | Status: SHIPPED | OUTPATIENT
Start: 2017-10-17 | End: 2018-05-21

## 2017-10-17 NOTE — TELEPHONE ENCOUNTER
LEVOTHYROXINE SODIUM 125 MCG Tablet    Last Written Prescription Date: 06/08/2017  Last Quantity: 85, # refills: 0  Last Office Visit with G, P or [facility] prescribing provider: 07/12/2017        TSH   Date Value Ref Range Status   07/12/2017 2.39 0.40 - 4.00 mU/L Final

## 2018-01-24 DIAGNOSIS — B02.9 HERPES ZOSTER WITHOUT COMPLICATION: ICD-10-CM

## 2018-01-26 RX ORDER — FAMCICLOVIR 500 MG/1
TABLET ORAL
Qty: 14 TABLET | Refills: 3 | Status: SHIPPED | OUTPATIENT
Start: 2018-01-26 | End: 2018-09-19

## 2018-01-26 NOTE — TELEPHONE ENCOUNTER
Prescription approved per [facility] Refill Protocol.  Isadora Llamas RN- Triage [employer]

## 2018-01-26 NOTE — TELEPHONE ENCOUNTER
"Requested Prescriptions   Pending Prescriptions Disp Refills     famciclovir (FAMVIR) 500 MG tablet [Pharmacy Med Name: FAMCICLOVIR 500 MG Tablet]  Last Written Prescription Date:  7/12/17  Last Fill Quantity: 14,  # refills: 3   Last Office Visit  7/12/2017        with  [facility], [facility] or [facility] prescribing provider:     Future Office Visit:        14 tablet 3     Sig: TAKE 1 TABLET TWICE DAILY    Antivirals for Herpes Protocol Passed    1/24/2018  2:23 PM       Passed - Patient is age 12 or older       Passed - Recent or future visit with authorizing provider's specialty    Patient had office visit in the last year or has a visit in the next 30 days with authorizing provider.  See \"Patient Info\" tab in inbasket, or \"Choose Columns\" in Meds & Orders section of the refill encounter.               "

## 2018-04-18 DIAGNOSIS — I10 HYPERTENSION GOAL BP (BLOOD PRESSURE) < 140/90: ICD-10-CM

## 2018-04-18 NOTE — TELEPHONE ENCOUNTER
"Requested Prescriptions   Pending Prescriptions Disp Refills     metoprolol succinate (TOPROL-XL) 25 MG 24 hr tablet [Pharmacy Med Name: METOPROLOL SUCCINATE ER 25 MG Tablet Extended Release 24 Hour]  Last Written Prescription Date:  10/10/17  Last Fill Quantity: 90,  # refills: 2   Last office visit: 7/12/2017 with prescribing provider:  Gisella   Future Office Visit:     90 tablet 2     Sig: TAKE 1 TABLET EVERY DAY    Beta-Blockers Protocol Passed    4/18/2018  7:23 AM       Passed - Blood pressure under 140/90 in past 12 months    BP Readings from Last 3 Encounters:   07/12/17 124/62   08/30/16 122/68   06/28/16 134/72                Passed - Patient is age 6 or older       Passed - Recent (12 mo) or future (30 days) visit within the authorizing provider's specialty    Patient had office visit in the last 12 months or has a visit in the next 30 days with authorizing provider or within the authorizing provider's specialty.  See \"Patient Info\" tab in inbasket, or \"Choose Columns\" in Meds & Orders section of the refill encounter.              "

## 2018-04-19 RX ORDER — METOPROLOL SUCCINATE 25 MG/1
TABLET, EXTENDED RELEASE ORAL
Qty: 90 TABLET | Refills: 0 | Status: SHIPPED | OUTPATIENT
Start: 2018-04-19 | End: 2018-08-21

## 2018-05-08 ENCOUNTER — TELEPHONE (OUTPATIENT)
Dept: FAMILY MEDICINE | Facility: CLINIC | Age: 78
End: 2018-05-08

## 2018-05-08 DIAGNOSIS — M10.9 PODAGRA: ICD-10-CM

## 2018-05-08 NOTE — TELEPHONE ENCOUNTER
Requested Prescriptions   Pending Prescriptions Disp Refills     predniSONE (DELTASONE) 20 MG tablet [Pharmacy Med Name: PREDNISONE^ 20 MG TAB 20MG TABLET]      Last Written Prescription Date:  7/12/17  Last Fill Quantity: 10,   # refills: 3  Last Office Visit: 7/12/17 [place]  Future Office visit:       Routing refill request to provider for review/approval because:  Drug not on the G, P or [facility] refill protocol or controlled substance   10 tablet      Sig: TAKE ONE TABLET BY MOUTH TWICE DAILY ONLY WHEN NEEDED FOR GOUT    There is no refill protocol information for this order

## 2018-05-08 NOTE — TELEPHONE ENCOUNTER
Subject: Refill    Topic: General Compliment    Not sure if my msg went thru on a refill for prednisone?  Plus questions I had, please advise if you received it    Thank you  Marina STEPHENS 1940     Message received in my chart  service basket. I do not see that we have received a refill for prednisone so I called pt and left a message on vm for her to call clinic back.    Ivy Paez, CMA

## 2018-05-09 ENCOUNTER — TELEPHONE (OUTPATIENT)
Dept: FAMILY MEDICINE | Facility: CLINIC | Age: 78
End: 2018-05-09

## 2018-05-09 RX ORDER — PREDNISONE 20 MG/1
TABLET ORAL
Qty: 10 TABLET | Refills: 0 | Status: SHIPPED | OUTPATIENT
Start: 2018-05-09 | End: 2018-07-30

## 2018-05-09 NOTE — TELEPHONE ENCOUNTER
PA Initiation    Medication: Prednisone  Insurance Company: [employer] - Phone [phone] Fax [phone]  Pharmacy Filling the Rx: [facility] [place] - [address] [place] [address]  Filling Pharmacy Phone: [phone]  Filling Pharmacy Fax:    Start Date: 5/9/2018    [place] Prior Authorization Team   Phone: [phone]

## 2018-05-15 NOTE — TELEPHONE ENCOUNTER
Prior Authorization Not Needed per Insurance    Medication: Prednisone  Insurance Company: [employer] - Phone [phone] Fax [phone]    Pharmacy Filling the Rx: [address]  Pharmacy Notified: Called [facility] pharmacy to make sure medication is going through her insurance, per pharmacist it did go through her insurance for $1 copay and they have contacted patient, no PA is needed.

## 2018-05-21 DIAGNOSIS — E03.9 HYPOTHYROIDISM, UNSPECIFIED TYPE: ICD-10-CM

## 2018-05-21 NOTE — TELEPHONE ENCOUNTER
"Requested Prescriptions   Pending Prescriptions Disp Refills     levothyroxine (SYNTHROID/LEVOTHROID) 125 MCG tablet [Pharmacy Med Name: LEVOTHYROXINE SODIUM 125 MCG Tablet]  Last Written Prescription Date:  10-17-17  Last Fill Quantity: 85tab,  # refills: 2   Last office visit: 7/12/2017 with prescribing provider:     Future Office Visit:     85 tablet 2     Sig: TAKE 1 TABLET 6 DAYS A WEEK AND TAKE 1/2 TABLET  ON SEVENTH DAY (APPOINTMENT AND LABS ARE NEEDED FOR MORE REFILLS)    Thyroid Protocol Passed    5/21/2018  3:21 PM       Passed - Patient is 12 years or older       Passed - Recent (12 mo) or future (30 days) visit within the authorizing provider's specialty    Patient had office visit in the last 12 months or has a visit in the next 30 days with authorizing provider or within the authorizing provider's specialty.  See \"Patient Info\" tab in inbasket, or \"Choose Columns\" in Meds & Orders section of the refill encounter.           Passed - Normal TSH on file in past 12 months    Recent Labs   Lab Test  07/12/17   1229   TSH  2.39             Passed - No active pregnancy on record    If patient is pregnant or has had a positive pregnancy test, please check TSH.         Passed - No positive pregnancy test in past 12 months    If patient is pregnant or has had a positive pregnancy test, please check TSH.            "

## 2018-05-23 RX ORDER — LEVOTHYROXINE SODIUM 125 UG/1
TABLET ORAL
Qty: 85 TABLET | Refills: 0 | Status: SHIPPED | OUTPATIENT
Start: 2018-05-23 | End: 2018-08-21

## 2018-06-24 DIAGNOSIS — E78.5 HYPERLIPIDEMIA LDL GOAL <100: ICD-10-CM

## 2018-06-27 RX ORDER — SIMVASTATIN 40 MG
TABLET ORAL
Qty: 90 TABLET | Refills: 0 | Status: SHIPPED | OUTPATIENT
Start: 2018-06-27 | End: 2018-08-21

## 2018-07-25 DIAGNOSIS — E03.9 HYPOTHYROIDISM, UNSPECIFIED TYPE: ICD-10-CM

## 2018-07-25 DIAGNOSIS — I73.9 PERIPHERAL VASCULAR DISEASE (H): ICD-10-CM

## 2018-07-25 RX ORDER — LEVOTHYROXINE SODIUM 125 UG/1
TABLET ORAL
Qty: 85 TABLET | Refills: 0 | OUTPATIENT
Start: 2018-07-25

## 2018-07-25 NOTE — TELEPHONE ENCOUNTER
"Requested Prescriptions   Pending Prescriptions Disp Refills     clopidogrel (PLAVIX) 75 MG tablet [Pharmacy Med Name: CLOPIDOGREL 75 MG Tablet]  Last Written Prescription Date:  5/22/18  Last Fill Quantity: 90,  # refills: 0   Last office visit: 7/12/2017 with prescribing provider:  Gisella   Future Office Visit:     90 tablet 0     Sig: TAKE 1 TABLET EVERY DAY    Plavix Failed    7/25/2018 12:37 PM       Failed - Normal HGB on file in past 12 months    Recent Labs   Lab Test  07/12/17   1229   HGB  13.8              Failed - Normal Platelets on file in past 12 months    Recent Labs   Lab Test  07/12/17   1229   PLT  182              Failed - Recent (12 mo) or future (30 days) visit within the authorizing provider's specialty    Patient had office visit in the last 12 months or has a visit in the next 30 days with authorizing provider or within the authorizing provider's specialty.  See \"Patient Info\" tab in inbasket, or \"Choose Columns\" in Meds & Orders section of the refill encounter.           Passed - No active PPI on record unless is Protonix       Passed - Patient is age 18 or older       Passed - No active pregnancy on record       Passed - No positive pregnancy test in past 12 months          "

## 2018-07-25 NOTE — TELEPHONE ENCOUNTER
"Requested Prescriptions   Pending Prescriptions Disp Refills     levothyroxine (SYNTHROID/LEVOTHROID) 125 MCG tablet [Pharmacy Med Name: LEVOTHYROXINE SODIUM 125 MCG Tablet]  Last Written Prescription Date:  5/23/18  Last Fill Quantity: 85,  # refills: 0   Last office visit: 7/12/2017 with prescribing provider:  Gisella   Future Office Visit:     85 tablet 0     Sig: TAKE 1 TABLET SIX DAYS A WEEK AND TAKE 1/2 TABLET  ON THE SEVENTH DAY (APPOINTMENT AND LABS ARE NEEDED FOR MORE REFILLS)    Thyroid Protocol Failed    7/25/2018 12:37 PM       Failed - Recent (12 mo) or future (30 days) visit within the authorizing provider's specialty    Patient had office visit in the last 12 months or has a visit in the next 30 days with authorizing provider or within the authorizing provider's specialty.  See \"Patient Info\" tab in inbasket, or \"Choose Columns\" in Meds & Orders section of the refill encounter.           Failed - Normal TSH on file in past 12 months    Recent Labs   Lab Test  07/12/17   1229   TSH  2.39             Passed - Patient is 12 years or older       Passed - No active pregnancy on record    If patient is pregnant or has had a positive pregnancy test, please check TSH.         Passed - No positive pregnancy test in past 12 months    If patient is pregnant or has had a positive pregnancy test, please check TSH.            "

## 2018-07-26 RX ORDER — CLOPIDOGREL BISULFATE 75 MG/1
TABLET ORAL
Qty: 90 TABLET | Refills: 1 | Status: ON HOLD | OUTPATIENT
Start: 2018-07-26 | End: 2018-10-04

## 2018-07-26 RX ORDER — CLOPIDOGREL BISULFATE 75 MG/1
TABLET ORAL
Qty: 30 TABLET | Refills: 0 | Status: SHIPPED | OUTPATIENT
Start: 2018-07-26 | End: 2018-07-26

## 2018-07-26 NOTE — TELEPHONE ENCOUNTER
Routing refill request to provider for review/approval because:  Erinn given x1 and patient did not follow up, please advise

## 2018-07-30 DIAGNOSIS — M10.9 PODAGRA: ICD-10-CM

## 2018-07-30 NOTE — TELEPHONE ENCOUNTER
Requested Prescriptions   Pending Prescriptions Disp Refills     predniSONE (DELTASONE) 20 MG tablet [Pharmacy Med Name: PREDNISONE^ 20 MG TAB 20MG TABLET] 10 tablet      Sig: TAKE ONE TABLET BY MOUTH TWICE DAILY ONLY WHEN NEEDED FOR GOUT    There is no refill protocol information for this order        Last Written Prescription Date:  5/9/18  Last Fill Quantity: 10,   # refills: 0  Last Office Visit: 7/12/17  Future Office visit:    Next 5 appointments (look out 90 days)     Aug 21, 2018  9:30 AM CDT   [facility]    [address] [place] [id number] [phone]                   Routing refill request to provider for review/approval because:  Drug not on the [facility], [facility] or [facility] refill protocol or controlled substance

## 2018-07-31 RX ORDER — PREDNISONE 20 MG/1
TABLET ORAL
Qty: 10 TABLET | Refills: 1 | Status: SHIPPED | OUTPATIENT
Start: 2018-07-31 | End: 2018-08-21

## 2018-07-31 NOTE — TELEPHONE ENCOUNTER
1:35pm, I relayed the message from speaking with triage to the patient that the nurse will get this prescription request to Dr. Obando. Patient said she is at the pharmacy and wants this prescription approved within half an hour.  Patient says it is unacceptable for a prescription to take this long.

## 2018-08-21 ENCOUNTER — OFFICE VISIT (OUTPATIENT)
Dept: FAMILY MEDICINE | Facility: CLINIC | Age: 78
End: 2018-08-21
Payer: MEDICARE

## 2018-08-21 VITALS
WEIGHT: 184 LBS | OXYGEN SATURATION: 99 % | BODY MASS INDEX: 29.57 KG/M2 | SYSTOLIC BLOOD PRESSURE: 130 MMHG | DIASTOLIC BLOOD PRESSURE: 62 MMHG | TEMPERATURE: 97.8 F | RESPIRATION RATE: 20 BRPM | HEIGHT: 66 IN | HEART RATE: 71 BPM

## 2018-08-21 DIAGNOSIS — R93.89 ABNORMAL CT OF THE CHEST: ICD-10-CM

## 2018-08-21 DIAGNOSIS — I10 HYPERTENSION GOAL BP (BLOOD PRESSURE) < 140/90: ICD-10-CM

## 2018-08-21 DIAGNOSIS — M10.9 PODAGRA: ICD-10-CM

## 2018-08-21 DIAGNOSIS — D35.01 ADRENAL ADENOMA, RIGHT: ICD-10-CM

## 2018-08-21 DIAGNOSIS — R10.31 RIGHT LOWER QUADRANT PAIN: ICD-10-CM

## 2018-08-21 DIAGNOSIS — G89.4 CHRONIC PAIN SYNDROME: ICD-10-CM

## 2018-08-21 DIAGNOSIS — N18.30 CHRONIC RENAL IMPAIRMENT, STAGE 3 (MODERATE) (H): ICD-10-CM

## 2018-08-21 DIAGNOSIS — Z87.891 PERSONAL HISTORY OF TOBACCO USE, PRESENTING HAZARDS TO HEALTH: ICD-10-CM

## 2018-08-21 DIAGNOSIS — J43.8 OTHER EMPHYSEMA (H): ICD-10-CM

## 2018-08-21 DIAGNOSIS — Z00.00 ROUTINE GENERAL MEDICAL EXAMINATION AT A HEALTH CARE FACILITY: Primary | ICD-10-CM

## 2018-08-21 DIAGNOSIS — E78.5 HYPERLIPIDEMIA LDL GOAL <100: ICD-10-CM

## 2018-08-21 DIAGNOSIS — E03.9 HYPOTHYROIDISM, UNSPECIFIED TYPE: ICD-10-CM

## 2018-08-21 DIAGNOSIS — I73.9 PERIPHERAL VASCULAR DISEASE (H): ICD-10-CM

## 2018-08-21 DIAGNOSIS — E79.0 HYPERURICEMIA: ICD-10-CM

## 2018-08-21 PROBLEM — E03.8 OTHER SPECIFIED HYPOTHYROIDISM: Status: ACTIVE | Noted: 2018-08-21

## 2018-08-21 LAB
ALBUMIN SERPL-MCNC: 4.4 G/DL (ref 3.4–5)
ALBUMIN UR-MCNC: NEGATIVE MG/DL
ALP SERPL-CCNC: 98 U/L (ref 40–150)
ALT SERPL W P-5'-P-CCNC: 24 U/L (ref 0–50)
ANION GAP SERPL CALCULATED.3IONS-SCNC: 12 MMOL/L (ref 3–14)
APPEARANCE UR: CLEAR
AST SERPL W P-5'-P-CCNC: 23 U/L (ref 0–45)
BASOPHILS # BLD AUTO: 0 10E9/L (ref 0–0.2)
BASOPHILS NFR BLD AUTO: 0.4 %
BILIRUB SERPL-MCNC: 0.6 MG/DL (ref 0.2–1.3)
BILIRUB UR QL STRIP: NEGATIVE
BUN SERPL-MCNC: 46 MG/DL (ref 7–30)
CALCIUM SERPL-MCNC: 9.1 MG/DL (ref 8.5–10.1)
CHLORIDE SERPL-SCNC: 103 MMOL/L (ref 94–109)
CHOLEST SERPL-MCNC: 172 MG/DL
CO2 SERPL-SCNC: 26 MMOL/L (ref 20–32)
COLOR UR AUTO: YELLOW
CREAT SERPL-MCNC: 2.01 MG/DL (ref 0.52–1.04)
DIFFERENTIAL METHOD BLD: NORMAL
EOSINOPHIL # BLD AUTO: 0.2 10E9/L (ref 0–0.7)
EOSINOPHIL NFR BLD AUTO: 2.9 %
ERYTHROCYTE [DISTWIDTH] IN BLOOD BY AUTOMATED COUNT: 12.8 % (ref 10–15)
GFR SERPL CREATININE-BSD FRML MDRD: 24 ML/MIN/1.7M2
GLUCOSE SERPL-MCNC: 112 MG/DL (ref 70–99)
GLUCOSE UR STRIP-MCNC: NEGATIVE MG/DL
HCT VFR BLD AUTO: 42.9 % (ref 35–47)
HDLC SERPL-MCNC: 43 MG/DL
HGB BLD-MCNC: 14 G/DL (ref 11.7–15.7)
HGB UR QL STRIP: NEGATIVE
KETONES UR STRIP-MCNC: NEGATIVE MG/DL
LDLC SERPL CALC-MCNC: 91 MG/DL
LEUKOCYTE ESTERASE UR QL STRIP: NEGATIVE
LYMPHOCYTES # BLD AUTO: 1.2 10E9/L (ref 0.8–5.3)
LYMPHOCYTES NFR BLD AUTO: 22.1 %
MCH RBC QN AUTO: 32 PG (ref 26.5–33)
MCHC RBC AUTO-ENTMCNC: 32.6 G/DL (ref 31.5–36.5)
MCV RBC AUTO: 98 FL (ref 78–100)
MONOCYTES # BLD AUTO: 0.5 10E9/L (ref 0–1.3)
MONOCYTES NFR BLD AUTO: 9.4 %
NEUTROPHILS # BLD AUTO: 3.7 10E9/L (ref 1.6–8.3)
NEUTROPHILS NFR BLD AUTO: 65.2 %
NITRATE UR QL: NEGATIVE
NONHDLC SERPL-MCNC: 129 MG/DL
PH UR STRIP: 5 PH (ref 5–7)
PLATELET # BLD AUTO: 187 10E9/L (ref 150–450)
POTASSIUM SERPL-SCNC: 3.8 MMOL/L (ref 3.4–5.3)
PROT SERPL-MCNC: 8.2 G/DL (ref 6.8–8.8)
RBC # BLD AUTO: 4.38 10E12/L (ref 3.8–5.2)
SODIUM SERPL-SCNC: 141 MMOL/L (ref 133–144)
SOURCE: NORMAL
SP GR UR STRIP: <=1.005 (ref 1–1.03)
TRIGL SERPL-MCNC: 192 MG/DL
TSH SERPL DL<=0.005 MIU/L-ACNC: 2.34 MU/L (ref 0.4–4)
URATE SERPL-MCNC: 9.6 MG/DL (ref 2.6–6)
UROBILINOGEN UR STRIP-ACNC: 0.2 EU/DL (ref 0.2–1)
WBC # BLD AUTO: 5.6 10E9/L (ref 4–11)

## 2018-08-21 PROCEDURE — 85025 COMPLETE CBC W/AUTO DIFF WBC: CPT | Performed by: INTERNAL MEDICINE

## 2018-08-21 PROCEDURE — 80053 COMPREHEN METABOLIC PANEL: CPT | Performed by: INTERNAL MEDICINE

## 2018-08-21 PROCEDURE — 84443 ASSAY THYROID STIM HORMONE: CPT | Performed by: INTERNAL MEDICINE

## 2018-08-21 PROCEDURE — 81003 URINALYSIS AUTO W/O SCOPE: CPT | Performed by: INTERNAL MEDICINE

## 2018-08-21 PROCEDURE — 84550 ASSAY OF BLOOD/URIC ACID: CPT | Performed by: INTERNAL MEDICINE

## 2018-08-21 PROCEDURE — 99213 OFFICE O/P EST LOW 20 MIN: CPT | Mod: 25 | Performed by: INTERNAL MEDICINE

## 2018-08-21 PROCEDURE — 80061 LIPID PANEL: CPT | Performed by: INTERNAL MEDICINE

## 2018-08-21 PROCEDURE — 36415 COLL VENOUS BLD VENIPUNCTURE: CPT | Performed by: INTERNAL MEDICINE

## 2018-08-21 PROCEDURE — G0439 PPPS, SUBSEQ VISIT: HCPCS | Performed by: INTERNAL MEDICINE

## 2018-08-21 RX ORDER — METOPROLOL SUCCINATE 25 MG/1
TABLET, EXTENDED RELEASE ORAL
Qty: 90 TABLET | Refills: 3 | Status: SHIPPED | OUTPATIENT
Start: 2018-08-21 | End: 2019-10-18

## 2018-08-21 RX ORDER — SIMVASTATIN 40 MG
TABLET ORAL
Qty: 90 TABLET | Refills: 3 | Status: SHIPPED | OUTPATIENT
Start: 2018-08-21 | End: 2019-09-04

## 2018-08-21 RX ORDER — TIZANIDINE 2 MG/1
2 TABLET ORAL 3 TIMES DAILY
Qty: 270 TABLET | Refills: 3 | Status: ON HOLD | OUTPATIENT
Start: 2018-08-21 | End: 2018-10-02

## 2018-08-21 RX ORDER — PREDNISONE 20 MG/1
TABLET ORAL
Qty: 10 TABLET | Refills: 1 | Status: SHIPPED | OUTPATIENT
Start: 2018-08-21 | End: 2018-08-21

## 2018-08-21 RX ORDER — PREDNISONE 20 MG/1
TABLET ORAL
Qty: 10 TABLET | Refills: 3 | Status: SHIPPED | OUTPATIENT
Start: 2018-08-21 | End: 2019-08-27

## 2018-08-21 RX ORDER — ALBUTEROL SULFATE 90 UG/1
2 AEROSOL, METERED RESPIRATORY (INHALATION) EVERY 6 HOURS PRN
Qty: 1 INHALER | Refills: 5 | Status: SHIPPED | OUTPATIENT
Start: 2018-08-21 | End: 2019-12-27

## 2018-08-21 RX ORDER — LEVOTHYROXINE SODIUM 125 UG/1
TABLET ORAL
Qty: 85 TABLET | Refills: 3 | Status: SHIPPED | OUTPATIENT
Start: 2018-08-21 | End: 2018-10-01

## 2018-08-21 ASSESSMENT — PATIENT HEALTH QUESTIONNAIRE - PHQ9
SUM OF ALL RESPONSES TO PHQ QUESTIONS 1-9: 2
10. IF YOU CHECKED OFF ANY PROBLEMS, HOW DIFFICULT HAVE THESE PROBLEMS MADE IT FOR YOU TO DO YOUR WORK, TAKE CARE OF THINGS AT HOME, OR GET ALONG WITH OTHER PEOPLE: NOT DIFFICULT AT ALL
SUM OF ALL RESPONSES TO PHQ QUESTIONS 1-9: 2

## 2018-08-21 ASSESSMENT — ANXIETY QUESTIONNAIRES
3. WORRYING TOO MUCH ABOUT DIFFERENT THINGS: SEVERAL DAYS
GAD7 TOTAL SCORE: 1
7. FEELING AFRAID AS IF SOMETHING AWFUL MIGHT HAPPEN: NOT AT ALL
1. FEELING NERVOUS, ANXIOUS, OR ON EDGE: NOT AT ALL
5. BEING SO RESTLESS THAT IT IS HARD TO SIT STILL: NOT AT ALL
GAD7 TOTAL SCORE: 1
6. BECOMING EASILY ANNOYED OR IRRITABLE: NOT AT ALL
GAD7 TOTAL SCORE: 1
7. FEELING AFRAID AS IF SOMETHING AWFUL MIGHT HAPPEN: NOT AT ALL
2. NOT BEING ABLE TO STOP OR CONTROL WORRYING: NOT AT ALL
4. TROUBLE RELAXING: NOT AT ALL

## 2018-08-21 ASSESSMENT — ACTIVITIES OF DAILY LIVING (ADL)
CURRENT_FUNCTION: NO ASSISTANCE NEEDED
I_NEED_ASSISTANCE_FOR_THE_FOLLOWING_DAILY_ACTIVITIES:: NO ASSISTANCE IS NEEDED

## 2018-08-21 NOTE — PATIENT INSTRUCTIONS
I will let you know your lab results.   Consider getting the shingles vaccine (Shingrix) at your pharmacy.   Set up the CT of your abdomen and chest.

## 2018-08-21 NOTE — PROGRESS NOTES
SUBJECTIVE:   Marina Cummins is a 78 year old female who presents for Preventive Visit.      Are you in the first 12 months of your Medicare coverage?  No    Physical   Annual:     Getting at least 3 servings of Calcium per day:  NO    Bi-annual eye exam:  Yes    Dental care twice a year:  Yes    Sleep apnea or symptoms of sleep apnea:  Daytime drowsiness    Diet:  Low salt and Low fat/cholesterol    Frequency of exercise:  1 day/week    Duration of exercise:  Less than 15 minutes    Taking medications regularly:  Yes    Medication side effects:  None    Additional concerns today:  YES    Ability to successfully perform activities of daily living: no assistance needed    Home Safety:  No safety concerns identified    Hearing Impairment: no hearing concerns        Fall risk:     Mild risk and only 1  fall within last year- just leg pain and abrasions.  COGNITIVE SCREEN  1) Repeat 3 items (Leader, Season, Table)    2) Clock draw: NORMAL  3) 3 item recall: Recalls 3 objects  Results: 3 items recalled: COGNITIVE IMPAIRMENT LESS LIKELY    Mini-CogTM Copyright S Yared. Licensed by the author for use in [facility]; reprinted with permission (jon@.[facility]). All rights reserved.        Reviewed and updated as needed this visit by clinical staff  Tobacco  Allergies  Meds  Med Hx  Surg Hx  Fam Hx  Soc Hx        Reviewed and updated as needed this visit by Provider        Social History   Substance Use Topics     Smoking status: Former Smoker     Quit date: 5/15/1998     Smokeless tobacco: Never Used     Alcohol use Yes      Comment: occ.       Alcohol Use 8/21/2018   If you drink alcohol do you typically have greater than 3 drinks per day OR greater than 7 drinks per week? No               Just turned 78 yrs old.             States 4 episodes of podagra R over the past year.    No allopurinol; no recent uric acid level.               Ongoing but stable neck and back pain.         Tizanidine TID.    "This helps a little bit.      R leg painful/numb when trying to walk.            RLQ pain; and a bulge there.                          Sees renal in 10/18.                                        Wants an antibiotic on hand to Rx bronchitis.         Quit smoking 20 yrs ago.         She reports minimal cough, and minimal shortness of breath.  She has a many-pack-year history of smoking.     She is undecided regarding any chest/lung screening.                    C/o \"heartburn\" lately.              Takes an OTC med which helps.                                                                                Today's PHQ-2 Score:   PHQ-2 ( 1999 Pfizer) 8/21/2018   Q1: Little interest or pleasure in doing things 0   Q2: Feeling down, depressed or hopeless 1   PHQ-2 Score 1   Q1: Little interest or pleasure in doing things Not at all   Q2: Feeling down, depressed or hopeless Several days   PHQ-2 Score 1       Do you feel safe in your environment - Yes    Do you have a Health Care Directive?: Yes: Patient states has Advance Directive and will bring in a copy to clinic.    Current providers sharing in care for this patient include:   Patient Care Team:  Cody Obando MD as PCP - General (Internal Medicine)    The following health maintenance items are reviewed in Epic and correct as of today:  Health Maintenance   Topic Date Due     SPIROMETRY ONETIME  1940     COPD ACTION PLAN Q1 YR  1940     URINE DRUG SCREEN Q1 YR  06/22/1953     MEDICARE ANNUAL WELLNESS VISIT  06/22/1956     CARLEE QUESTIONNAIRE 1 YEAR  06/22/1956     PHQ-9 Q1YR  06/22/1956     FALL RISK ASSESSMENT  07/12/2018     INFLUENZA VACCINE (1) 09/01/2018     ADVANCE DIRECTIVE PLANNING Q5 YRS  06/28/2021     TETANUS IMMUNIZATION (SYSTEM ASSIGNED)  06/23/2025     DEXA SCAN SCREENING (SYSTEM ASSIGNED)  Completed     PNEUMOCOCCAL  Completed     Patient Active Problem List    Diagnosis Date Noted     Personal history of tobacco use, presenting hazards to " "health 06/26/2016     Priority: High     Quit in 19__;            Pack yrs?       Hypertension goal BP (blood pressure) < 140/90 01/09/2015     Priority: High     Chronic renal insufficiency 06/17/2014     Priority: High     Peripheral vascular disease (H) 05/15/2013     Priority: High     MELANIE 1.1 bilat in 6/13; stable  Problem list name updated by automated process. Provider to review       Hypothyroidism 05/15/2013     Priority: High     Hyperlipidemia LDL goal <100 05/15/2013     Priority: High     Cor angio approx 11/09; \"minimal disease\"      Lipids were good at renal; see 6/14 note       Cervical spine arthritis (H) 07/12/2017     Priority: Medium     Chronic pain syndrome 05/17/2016     Priority: Medium     Podagra 07/14/2015     Priority: Medium     Hand joint pain 04/07/2015     Priority: Medium     Bee sting reaction 06/26/2014     Priority: Medium     Osteopenia 06/17/2014     Priority: Medium     Mild; in 2009, T-1.6 R, -0.4 spine       Back pain 12/31/2012     Priority: Medium     COPD (chronic obstructive pulmonary disease) (H) 06/06/2012     Priority: Medium     Listed as a Dx in Pending sale to [facility] on 6/6/12       ACP (advance care planning) 06/28/2016     Priority: Low     Advance Care Planning 6/28/2016: Receipt of ACP document:  Received: Health Care Directive which was witnessed or notarized on 11/27/11.  Document not previously scanned.  Validation form completed and sent with document to be scanned.  Code Status needs to be updated to reflect choices in most recent ACP document. Notification sent to Dr. Cody Obando  for followup.  Confirmed/documented designated decision maker(s).  Added by Nevaeh Mckinnon             Preventive measure 05/15/2013     Priority: Low     [facility] DATA BASE UNDER THE 5/15/13 NOTE  Colonoscopy approx 2005; pt declines another         S/p bilateral mastectomy; s/p hysterectomy         Past Surgical History:   Procedure Laterality Date     AORTO BI ILIAC BYPASS Bilateral 1999     " APPENDECTOMY       CHOLECYSTECTOMY       HYSTERECTOMY, PAP NO LONGER INDICATED  1976    no oophorectomy     JOINT REPLACEMENT, HIP RT/LT Bilateral     Joint Replacement Hip RT/LT     MASTECTOMY, SIMPLE RT/LT/DARLENE Bilateral 1974    fibrocystic disease     SPINE SURGERY  1972,1982     THYROIDECTOMY       goiter     Social History     Social History     Marital status:      Spouse name:  4/14     Number of children: 2     Years of education: N/A     Occupational History      Retired     Social History Main Topics     Smoking status: Former Smoker     Quit date: 5/15/1998     Smokeless tobacco: Never Used     Alcohol use Yes      Comment: occ.     Drug use: No     Sexual activity: No     Other Topics Concern     Parent/Sibling W/ Cabg, Mi Or Angioplasty Before 65f 55m? Yes     Social History Narrative    Lives in [place] full time since the 1990's       BP Readings from Last 3 Encounters:   08/21/18 130/62   07/12/17 124/62   08/30/16 122/68    Wt Readings from Last 3 Encounters:   08/21/18 184 lb (83.5 kg)   07/12/17 182 lb (82.6 kg)   08/30/16 185 lb (83.9 kg)                  Current Outpatient Prescriptions   Medication Sig Dispense Refill     albuterol (PROAIR HFA/PROVENTIL HFA/VENTOLIN HFA) 108 (90 BASE) MCG/ACT Inhaler Inhale 2 puffs into the lungs every 6 hours as needed for shortness of breath / dyspnea or wheezing 1 Inhaler 5     calcitRIOL (ROCALTROL) 0.25 MCG capsule Take 1 capsule by mouth every other day. 90 capsule 1     clopidogrel (PLAVIX) 75 MG tablet TAKE 1 TABLET EVERY DAY 90 tablet 1     desonide (DESOWEN) 0.05 % cream        diclofenac (VOLTAREN) 1 % GEL Apply 2 grams to hands four times daily prn using enclosed dosing card. 100 g 5     EPINEPHrine (EPIPEN) 0.3 MG/0.3ML injection Inject 0.3 mLs (0.3 mg) into the muscle once as needed for anaphylaxis 1 each 11     famciclovir (FAMVIR) 500 MG tablet TAKE 1 TABLET TWICE DAILY 14 tablet 3     furosemide (LASIX) 20 MG tablet 40 mg AM, 20 mg  " tablet 1     ketoconazole (NIZORAL) 2 % cream        levothyroxine (SYNTHROID/LEVOTHROID) 125 MCG tablet TAKE 1 TABLET 6 DAYS A WEEK AND TAKE 1/2 TABLET  ON SEVENTH DAY (APPOINTMENT AND LABS ARE NEEDED FOR MORE REFILLS) 85 tablet 0     metoprolol succinate (TOPROL-XL) 25 MG 24 hr tablet TAKE 1 TABLET EVERY DAY 90 tablet 0     multivitamin  with lutein (OCUVITE WITH LTEIN) CAPS Take 1 capsule by mouth daily       predniSONE (DELTASONE) 20 MG tablet TAKE ONE TABLET BY MOUTH TWICE DAILY ONLY WHEN NEEDED FOR GOUT 10 tablet 1     simvastatin (ZOCOR) 40 MG tablet TAKE 1 TABLET EVERY DAY 90 tablet 0     spironolactone (ALDACTONE) 50 MG tablet Take 1 tablet (50 mg) by mouth daily 30 tablet 1     tiZANidine (ZANAFLEX) 2 MG tablet TAKE 1 TABLET THREE TIMES DAILY AND AN EXTRA DOSE AT BEDTIME 360 tablet 3     Allergies   Allergen Reactions     Aspirin      Gabapentin      Confusion     Sulfa Drugs      Tramadol      Nausea and Vomiting       Zithromax [Azithromycin Dihydrate]      Diarrhea.      Zovirax      Mood swings. Suicidal.           Review of Systems see above plus  CONSTITUTIONAL: NEGATIVE for fever, chills, change in weight  INTEGUMENTARY/SKIN: NEGATIVE for worrisome rashes, moles or lesions  EYES: NEGATIVE for vision changes or irritation  ENT/MOUTH: NEGATIVE for ear, mouth and throat problems  RESP:NEGATIVE for hemoptysis and wheezing  CV: NEGATIVE for chest pain/chest pressure and palpitations  GI: NEGATIVE for hematochezia and melena  : NEGATIVE for frequency, dysuria, or hematuria  NEURO: NEGATIVE for weakness, dizziness or paresthesias  ENDOCRINE: NEGATIVE for temperature intolerance, skin/hair changes  HEME: NEGATIVE for bleeding problems  PSYCHIATRIC: NEGATIVE for changes in mood or affect    OBJECTIVE:   /62 (BP Location: Left arm, Patient Position: Chair, Cuff Size: Adult Regular)  Pulse 71  Temp 97.8  F (36.6  C)  Resp 20  Ht 5' 5.5\" (1.664 m)  Wt 184 lb (83.5 kg)  SpO2 99%  " "Breastfeeding? No  BMI 30.15 kg/m2 Estimated body mass index is 29.83 kg/(m^2) as calculated from the following:    Height as of 7/12/17: 5' 5.5\" (1.664 m).    Weight as of 7/12/17: 182 lb (82.6 kg).  Physical Exam  GENERAL APPEARANCE: alert, no distress, elderly and frail  EYES: Eyes grossly normal to inspection  HENT: nose and mouth without ulcers or lesions and oropharynx clear  NECK: no adenopathy, no asymmetry, masses, or scars and thyroid normal to palpation  RESP: no rhonchi and no wheezes  BREAST: Status post bilateral mastectomy  CV: regular rates and rhythm, normal S1 S2, no S3 or S4, no murmur, click or rub and varicosities with venous stasis skin changes, slight edema, pulses are palpable in the feet  ABDOMEN: soft, no hepatosplenomegaly, no masses and mild right lower quadrant and suprapubic tenderness  MS: neck exam reveals ROM is abnormal  SKIN: no suspicious lesions or rashes  NEURO: Normal strength and tone and speech normal  PSYCH: mentation appears normal and affect normal/bright    Diagnostic Test Results:  Pending    ASSESSMENT / PLAN:   Marina was seen today for physical.    Diagnoses and all orders for this visit:    Routine general medical examination at a health care facility    Podagra  -     Discontinue: predniSONE (DELTASONE) 20 MG tablet; TAKE ONE TABLET BY MOUTH TWICE DAILY ONLY WHEN NEEDED FOR GOUT  -     Uric acid  -     predniSONE (DELTASONE) 20 MG tablet; TAKE ONE TABLET BY MOUTH TWICE DAILY ONLY WHEN NEEDED FOR GOUT    Chronic renal impairment, stage 3 (moderate)  -     Comprehensive metabolic panel (BMP + Alb, Alk Phos, ALT, AST, Total. Bili, TP)  -     CBC with platelets and differential  -     UA reflex to Microscopic  -     RADIOLOGY REFERRAL    Hypertension goal BP (blood pressure) < 140/90  -     Comprehensive metabolic panel (BMP + Alb, Alk Phos, ALT, AST, Total. Bili, TP)  -     metoprolol succinate (TOPROL-XL) 25 MG 24 hr tablet; TAKE 1 TABLET EVERY DAY    Hyperlipidemia " LDL goal <100  -     Lipid Profile (Chol, Trig, HDL, LDL calc)  -     simvastatin (ZOCOR) 40 MG tablet; TAKE 1 TABLET EVERY DAY    Peripheral vascular disease (H)    Other emphysema (H)  -     albuterol (PROAIR HFA/PROVENTIL HFA/VENTOLIN HFA) 108 (90 Base) MCG/ACT inhaler; Inhale 2 puffs into the lungs every 6 hours as needed for shortness of breath / dyspnea or wheezing  -     amoxicillin-clavulanate (AUGMENTIN) 875-125 MG per tablet; Take 1 tablet by mouth 2 times daily    Hypothyroidism, unspecified type  -     TSH with free T4 reflex  -     levothyroxine (SYNTHROID/LEVOTHROID) 125 MCG tablet; TAKE 1 TABLET 6 DAYS A WEEK AND TAKE 1/2 TABLET  ON SEVENTH DAY    Chronic pain syndrome  -     tiZANidine (ZANAFLEX) 2 MG tablet; Take 1 tablet (2 mg) by mouth 3 times daily    Personal history of tobacco use, presenting hazards to health  -     RADIOLOGY REFERRAL    Right lower quadrant pain  -     RADIOLOGY REFERRAL           Summary and implications:  We reviewed her situation at length, including NEW problems of abdominal pain and right leg pain.                          Check labs and adjust medications as indicated.      She wants to go ahead with a CT of the abdomen, and she has decided to include a CT of the chest as well.   She does have some degree of chronic cough, and dyspnea on exertion.                                                Given her frequent episodes of podagra, check uric acid level and consider low-dose allopurinol.                She wants to have an antibiotic Rx on hand, in case of acute bronchitis this winter.  Rx given.                      Patient Instructions   I will let you know your lab results.   Consider getting the shingles vaccine (Shingrix) at your pharmacy.   Set up the CT of your abdomen and chest.           End of Life Planning:  Patient currently has an advanced directive: Yes.  Practitioner is supportive of decision.    COUNSELING:  Reviewed preventive health counseling, as  "reflected in patient instructions  Special attention given to:       Regular exercise       Healthy diet/nutrition    BP Readings from Last 1 Encounters:   07/12/17 124/62     Estimated body mass index is 29.83 kg/(m^2) as calculated from the following:    Height as of 7/12/17: 5' 5.5\" (1.664 m).    Weight as of 7/12/17: 182 lb (82.6 kg).           reports that she quit smoking about 20 years ago. She has never used smokeless tobacco.      Appropriate preventive services were discussed with this patient, including applicable screening as appropriate for cardiovascular disease, diabetes, osteopenia/osteoporosis, and glaucoma.  As appropriate for age/gender, discussed screening for colorectal cancer, prostate cancer, breast cancer, and cervical cancer. Checklist reviewing preventive services available has been given to the patient.    Reviewed patients plan of care and provided an AVS. The Basic Care Plan (routine screening as documented in Health Maintenance) for Marina meets the Care Plan requirement. This Care Plan has been established and reviewed with the Patient.    Counseling Resources:  ATP IV Guidelines  Pooled Cohorts Equation Calculator  Breast Cancer Risk Calculator  FRAX Risk Assessment  ICSI Preventive Guidelines  Dietary Guidelines for Americans, 2010  Buffer's MyPlate  ASA Prophylaxis  Lung CA Screening    Cody Obando MD  [facility]  Answers for HPI/ROS submitted by the patient on 8/21/2018   PHQ-2 Score: 1  If you checked off any problems, how difficult have these problems made it for you to do your work, take care of things at home, or get along with other people?: Not difficult at all  PHQ9 TOTAL SCORE: 2  CARLEE 7 TOTAL SCORE: 1              Results for orders placed or performed in visit on 08/21/18   Comprehensive metabolic panel (BMP + Alb, Alk Phos, ALT, AST, Total. Bili, TP)   Result Value Ref Range    Sodium 141 133 - 144 mmol/L    Potassium 3.8 3.4 - 5.3 mmol/L "    Chloride 103 94 - 109 mmol/L    Carbon Dioxide 26 20 - 32 mmol/L    Anion Gap 12 3 - 14 mmol/L    Glucose 112 (H) 70 - 99 mg/dL    Urea Nitrogen 46 (H) 7 - 30 mg/dL    Creatinine 2.01 (H) 0.52 - 1.04 mg/dL    GFR Estimate 24 (L) >60 mL/min/1.7m2    GFR Estimate If Black 29 (L) >60 mL/min/1.7m2    Calcium 9.1 8.5 - 10.1 mg/dL    Bilirubin Total 0.6 0.2 - 1.3 mg/dL    Albumin 4.4 3.4 - 5.0 g/dL    Protein Total 8.2 6.8 - 8.8 g/dL    Alkaline Phosphatase 98 40 - 150 U/L    ALT 24 0 - 50 U/L    AST 23 0 - 45 U/L   Lipid Profile (Chol, Trig, HDL, LDL calc)   Result Value Ref Range    Cholesterol 172 <200 mg/dL    Triglycerides 192 (H) <150 mg/dL    HDL Cholesterol 43 (L) >49 mg/dL    LDL Cholesterol Calculated 91 <100 mg/dL    Non HDL Cholesterol 129 <130 mg/dL   Uric acid   Result Value Ref Range    Uric Acid 9.6 (H) 2.6 - 6.0 mg/dL   CBC with platelets and differential   Result Value Ref Range    WBC 5.6 4.0 - 11.0 10e9/L    RBC Count 4.38 3.8 - 5.2 10e12/L    Hemoglobin 14.0 11.7 - 15.7 g/dL    Hematocrit 42.9 35.0 - 47.0 %    MCV 98 78 - 100 fl    MCH 32.0 26.5 - 33.0 pg    MCHC 32.6 31.5 - 36.5 g/dL    RDW 12.8 10.0 - 15.0 %    Platelet Count 187 150 - 450 10e9/L    Diff Method Automated Method     % Neutrophils 65.2 %    % Lymphocytes 22.1 %    % Monocytes 9.4 %    % Eosinophils 2.9 %    % Basophils 0.4 %    Absolute Neutrophil 3.7 1.6 - 8.3 10e9/L    Absolute Lymphocytes 1.2 0.8 - 5.3 10e9/L    Absolute Monocytes 0.5 0.0 - 1.3 10e9/L    Absolute Eosinophils 0.2 0.0 - 0.7 10e9/L    Absolute Basophils 0.0 0.0 - 0.2 10e9/L   TSH with free T4 reflex   Result Value Ref Range    TSH 2.34 0.40 - 4.00 mU/L   UA reflex to Microscopic   Result Value Ref Range    Color Urine Yellow     Appearance Urine Clear     Glucose Urine Negative NEG^Negative mg/dL    Bilirubin Urine Negative NEG^Negative    Ketones Urine Negative NEG^Negative mg/dL    Specific Gravity Urine <=1.005 1.003 - 1.035    Blood Urine Negative NEG^Negative  "   pH Urine 5.0 5.0 - 7.0 pH    Protein Albumin Urine Negative NEG^Negative mg/dL    Urobilinogen Urine 0.2 0.2 - 1.0 EU/dL    Nitrite Urine Negative NEG^Negative    Leukocyte Esterase Urine Negative NEG^Negative    Source Midstream Urine      My chart message and letter sent.                                                                                                                                 Your uric acid level is high at 9.6.              If we can lower this value to under 6, then your frequency of gout attacks should decline or stop.           I have sent an Rx to your pharmacy for a low dose of allopurinol; 100 mg per day.            Bring in this report when you see your kidney specialist,and ask them to recheck the uric acid level. The dosage may need to be increased.              Rarely this medication can cause fever and rash. If that happens, stop taking it.                                             Most of your other lab results are normal or stable,including the liver,kidney,thyroid,bone marrow,and LDL cholesterol.           Your blood sugar (glucose) and triglycerides are slightly elevated.            Please work harder on restricting carbohydrates ( starches, sweets, etc).          Addendum:         Chest CT shows \"abnormal findings left apex and right posteromedial base; neoplasia is not excludable.    Consideration could be given to doing a PET/CT scan.  At the least would recommend follow-up CT in 3 months.\"           Abdominal CT shows diverticulosis and \"a large R adrenal adenoma\"; no size is given.                                   My chart message sent.         She should see a thoracic surgery specialist, and bring in a copy of the scan to her nephrologist also.  "

## 2018-08-21 NOTE — LETTER
August 22, 2018      Marina Champagne Haroon faulkner  [address] [facility] [address]        Dear Ms.Du faulkner,    We are writing to inform you of your test results.           Your uric acid level is high at 9.6.              If we can lower this value to under 6, then your frequency of gout attacks should decline or stop.           I have sent an Rx to your pharmacy for a low dose of allopurinol; 100 mg per day.            Bring in this report when you see your kidney specialist,and ask them to recheck the uric acid level. The dosage may need to be increased.      Rarely this medication can cause fever and rash. If that happens, stop taking it.                                Most of your other lab results are normal or stable,including the liver,kidney,thyroid,bone marrow,and LDL cholesterol.                                                                                                                Your blood sugar (glucose) and triglycerides are slightly elevated.            Please work harder on restricting carbohydrates ( starches, sweets, etc).    Resulted Orders   Comprehensive metabolic panel (BMP + Alb, Alk Phos, ALT, AST, Total. Bili, TP)   Result Value Ref Range    Sodium 141 133 - 144 mmol/L    Potassium 3.8 3.4 - 5.3 mmol/L    Chloride 103 94 - 109 mmol/L    Carbon Dioxide 26 20 - 32 mmol/L    Anion Gap 12 3 - 14 mmol/L    Glucose 112 (H) 70 - 99 mg/dL      Comment:      Fasting specimen    Urea Nitrogen 46 (H) 7 - 30 mg/dL    Creatinine 2.01 (H) 0.52 - 1.04 mg/dL    GFR Estimate 24 (L) >60 mL/min/1.7m2      Comment:      Non  GFR Calc    GFR Estimate If Black 29 (L) >60 mL/min/1.7m2      Comment:       GFR Calc    Calcium 9.1 8.5 - 10.1 mg/dL    Bilirubin Total 0.6 0.2 - 1.3 mg/dL    Albumin 4.4 3.4 - 5.0 g/dL    Protein Total 8.2 6.8 - 8.8 g/dL    Alkaline Phosphatase 98 40 - 150 U/L    ALT 24 0 - 50 U/L    AST 23 0 - 45 U/L   Lipid Profile (Chol, Trig, HDL, LDL  calc)   Result Value Ref Range    Cholesterol 172 <200 mg/dL    Triglycerides 192 (H) <150 mg/dL      Comment:      Borderline high:  150-199 mg/dl  High:             200-499 mg/dl  Very high:       >499 mg/dl  Fasting specimen      HDL Cholesterol 43 (L) >49 mg/dL    LDL Cholesterol Calculated 91 <100 mg/dL      Comment:      Desirable:       <100 mg/dl    Non HDL Cholesterol 129 <130 mg/dL   Uric acid   Result Value Ref Range    Uric Acid 9.6 (H) 2.6 - 6.0 mg/dL   CBC with platelets and differential   Result Value Ref Range    WBC 5.6 4.0 - 11.0 10e9/L    RBC Count 4.38 3.8 - 5.2 10e12/L    Hemoglobin 14.0 11.7 - 15.7 g/dL    Hematocrit 42.9 35.0 - 47.0 %    MCV 98 78 - 100 fl    MCH 32.0 26.5 - 33.0 pg    MCHC 32.6 31.5 - 36.5 g/dL    RDW 12.8 10.0 - 15.0 %    Platelet Count 187 150 - 450 10e9/L    Diff Method Automated Method     % Neutrophils 65.2 %    % Lymphocytes 22.1 %    % Monocytes 9.4 %    % Eosinophils 2.9 %    % Basophils 0.4 %    Absolute Neutrophil 3.7 1.6 - 8.3 10e9/L    Absolute Lymphocytes 1.2 0.8 - 5.3 10e9/L    Absolute Monocytes 0.5 0.0 - 1.3 10e9/L    Absolute Eosinophils 0.2 0.0 - 0.7 10e9/L    Absolute Basophils 0.0 0.0 - 0.2 10e9/L   TSH with free T4 reflex   Result Value Ref Range    TSH 2.34 0.40 - 4.00 mU/L   UA reflex to Microscopic   Result Value Ref Range    Color Urine Yellow     Appearance Urine Clear     Glucose Urine Negative NEG^Negative mg/dL    Bilirubin Urine Negative NEG^Negative    Ketones Urine Negative NEG^Negative mg/dL    Specific Gravity Urine <=1.005 1.003 - 1.035    Blood Urine Negative NEG^Negative    pH Urine 5.0 5.0 - 7.0 pH    Protein Albumin Urine Negative NEG^Negative mg/dL    Urobilinogen Urine 0.2 0.2 - 1.0 EU/dL    Nitrite Urine Negative NEG^Negative    Leukocyte Esterase Urine Negative NEG^Negative    Source Midstream Urine        If you have any questions or concerns, please call the clinic at the number listed above.       Sincerely,        Cody WHITE  MD [place]

## 2018-08-22 RX ORDER — ALLOPURINOL 100 MG/1
100 TABLET ORAL DAILY
Qty: 90 TABLET | Refills: 1 | Status: SHIPPED | OUTPATIENT
Start: 2018-08-22 | End: 2018-10-30

## 2018-08-22 ASSESSMENT — PATIENT HEALTH QUESTIONNAIRE - PHQ9: SUM OF ALL RESPONSES TO PHQ QUESTIONS 1-9: 2

## 2018-08-22 ASSESSMENT — ANXIETY QUESTIONNAIRES: GAD7 TOTAL SCORE: 1

## 2018-08-23 ENCOUNTER — MYC MEDICAL ADVICE (OUTPATIENT)
Dept: FAMILY MEDICINE | Facility: CLINIC | Age: 78
End: 2018-08-23

## 2018-08-23 NOTE — TELEPHONE ENCOUNTER
The referral clearly states to give oral contrast, and NO IV CONTRAST.                     Please let them know.

## 2018-08-28 ENCOUNTER — TRANSFERRED RECORDS (OUTPATIENT)
Dept: HEALTH INFORMATION MANAGEMENT | Facility: CLINIC | Age: 78
End: 2018-08-28

## 2018-09-05 PROBLEM — D35.01 ADRENAL ADENOMA, RIGHT: Status: ACTIVE | Noted: 2018-09-05

## 2018-09-17 ENCOUNTER — TRANSFERRED RECORDS (OUTPATIENT)
Dept: HEALTH INFORMATION MANAGEMENT | Facility: CLINIC | Age: 78
End: 2018-09-17

## 2018-09-19 DIAGNOSIS — B02.9 HERPES ZOSTER WITHOUT COMPLICATION: ICD-10-CM

## 2018-09-19 NOTE — TELEPHONE ENCOUNTER
"Requested Prescriptions   Pending Prescriptions Disp Refills     famciclovir (FAMVIR) 500 MG tablet [Pharmacy Med Name: FAMCICLOVIR 500 MG Tablet]  Last Written Prescription Date:  1/26/2018  Last Fill Quantity: 14 tablet,  # refills: 3   Last Office Visit  8/21/2018        with  [facility], [facility] or [facility] prescribing provider:     Future Office Visit:        14 tablet 3     Sig: TAKE 1 TABLET TWICE DAILY    Antivirals for Herpes Protocol Passed    9/19/2018 10:18 AM       Passed - Patient is age 12 or older       Passed - Recent (12 mo) or future (30 days) visit within the authorizing provider's specialty    Patient had office visit in the last 12 months or has a visit in the next 30 days with authorizing provider or within the authorizing provider's specialty.  See \"Patient Info\" tab in inbasket, or \"Choose Columns\" in Meds & Orders section of the refill encounter.              "

## 2018-09-20 ENCOUNTER — TRANSFERRED RECORDS (OUTPATIENT)
Dept: HEALTH INFORMATION MANAGEMENT | Facility: CLINIC | Age: 78
End: 2018-09-20

## 2018-09-20 RX ORDER — FAMCICLOVIR 500 MG/1
TABLET ORAL
Qty: 14 TABLET | Refills: 3 | Status: SHIPPED | OUTPATIENT
Start: 2018-09-20 | End: 2019-06-16

## 2018-09-24 ENCOUNTER — TRANSFERRED RECORDS (OUTPATIENT)
Dept: HEALTH INFORMATION MANAGEMENT | Facility: CLINIC | Age: 78
End: 2018-09-24

## 2018-09-24 ENCOUNTER — HOSPITAL ENCOUNTER (OUTPATIENT)
Dept: LAB | Facility: CLINIC | Age: 78
Discharge: HOME | End: 2018-09-24
Attending: THORACIC SURGERY (CARDIOTHORACIC VASCULAR SURGERY) | Admitting: THORACIC SURGERY (CARDIOTHORACIC VASCULAR SURGERY)
Payer: MEDICARE

## 2018-09-24 DIAGNOSIS — R91.8 PULMONARY NODULES: Primary | ICD-10-CM

## 2018-09-24 PROCEDURE — 36415 COLL VENOUS BLD VENIPUNCTURE: CPT | Performed by: THORACIC SURGERY (CARDIOTHORACIC VASCULAR SURGERY)

## 2018-09-24 PROCEDURE — 86901 BLOOD TYPING SEROLOGIC RH(D): CPT | Performed by: THORACIC SURGERY (CARDIOTHORACIC VASCULAR SURGERY)

## 2018-09-24 PROCEDURE — 86900 BLOOD TYPING SEROLOGIC ABO: CPT | Performed by: THORACIC SURGERY (CARDIOTHORACIC VASCULAR SURGERY)

## 2018-09-24 PROCEDURE — 86850 RBC ANTIBODY SCREEN: CPT | Performed by: THORACIC SURGERY (CARDIOTHORACIC VASCULAR SURGERY)

## 2018-09-26 ENCOUNTER — TRANSFERRED RECORDS (OUTPATIENT)
Dept: HEALTH INFORMATION MANAGEMENT | Facility: CLINIC | Age: 78
End: 2018-09-26

## 2018-09-26 LAB
CREAT SERPL-MCNC: 2 MG/DL (ref 0.6–1)
GFR SERPL CREATININE-BSD FRML MDRD: 24 ML/MIN/1.73M2
GLUCOSE SERPL-MCNC: 93 MG/DL (ref 70–105)
POTASSIUM SERPL-SCNC: 4.1 MMOL/L (ref 3.5–5.1)

## 2018-10-02 ENCOUNTER — APPOINTMENT (OUTPATIENT)
Dept: GENERAL RADIOLOGY | Facility: CLINIC | Age: 78
DRG: 164 | End: 2018-10-02
Attending: THORACIC SURGERY (CARDIOTHORACIC VASCULAR SURGERY)
Payer: MEDICARE

## 2018-10-02 ENCOUNTER — ANESTHESIA EVENT (OUTPATIENT)
Dept: SURGERY | Facility: CLINIC | Age: 78
DRG: 164 | End: 2018-10-02
Payer: MEDICARE

## 2018-10-02 ENCOUNTER — HOSPITAL ENCOUNTER (INPATIENT)
Facility: CLINIC | Age: 78
LOS: 2 days | Discharge: HOME | DRG: 164 | End: 2018-10-04
Attending: THORACIC SURGERY (CARDIOTHORACIC VASCULAR SURGERY) | Admitting: THORACIC SURGERY (CARDIOTHORACIC VASCULAR SURGERY)
Payer: MEDICARE

## 2018-10-02 ENCOUNTER — ANESTHESIA (OUTPATIENT)
Dept: SURGERY | Facility: CLINIC | Age: 78
DRG: 164 | End: 2018-10-02
Payer: MEDICARE

## 2018-10-02 DIAGNOSIS — G89.12 POST-THORACOTOMY PAIN: ICD-10-CM

## 2018-10-02 DIAGNOSIS — R91.1 RIGHT LOWER LOBE PULMONARY NODULE: Primary | ICD-10-CM

## 2018-10-02 DIAGNOSIS — T40.2X5A CONSTIPATION DUE TO OPIOID THERAPY: ICD-10-CM

## 2018-10-02 DIAGNOSIS — I73.9 PERIPHERAL VASCULAR DISEASE (H): ICD-10-CM

## 2018-10-02 DIAGNOSIS — K59.03 CONSTIPATION DUE TO OPIOID THERAPY: ICD-10-CM

## 2018-10-02 LAB
ABO + RH BLD: NORMAL
ABO + RH BLD: NORMAL
ANION GAP SERPL CALCULATED.3IONS-SCNC: 10 MMOL/L (ref 3–14)
BLD GP AB SCN SERPL QL: NORMAL
BLOOD BANK CMNT PATIENT-IMP: NORMAL
BLOOD BANK CMNT PATIENT-IMP: NORMAL
BUN SERPL-MCNC: 45 MG/DL (ref 7–30)
CALCIUM SERPL-MCNC: 8.5 MG/DL (ref 8.5–10.1)
CHLORIDE SERPL-SCNC: 108 MMOL/L (ref 94–109)
CO2 SERPL-SCNC: 26 MMOL/L (ref 20–32)
CREAT SERPL-MCNC: 1.92 MG/DL (ref 0.52–1.04)
ERYTHROCYTE [DISTWIDTH] IN BLOOD BY AUTOMATED COUNT: 13.8 % (ref 10–15)
GFR SERPL CREATININE-BSD FRML MDRD: 25 ML/MIN/1.7M2
GLUCOSE SERPL-MCNC: 112 MG/DL (ref 70–99)
HCT VFR BLD AUTO: 35.6 % (ref 35–47)
HGB BLD-MCNC: 12 G/DL (ref 11.7–15.7)
INR PPP: 0.94 (ref 0.86–1.14)
MCH RBC QN AUTO: 32 PG (ref 26.5–33)
MCHC RBC AUTO-ENTMCNC: 33.7 G/DL (ref 31.5–36.5)
MCV RBC AUTO: 95 FL (ref 78–100)
PLATELET # BLD AUTO: 153 10E9/L (ref 150–450)
POTASSIUM SERPL-SCNC: 3.9 MMOL/L (ref 3.4–5.3)
RBC # BLD AUTO: 3.75 10E12/L (ref 3.8–5.2)
SODIUM SERPL-SCNC: 144 MMOL/L (ref 133–144)
SPECIMEN EXP DATE BLD: NORMAL
WBC # BLD AUTO: 5.5 10E9/L (ref 4–11)

## 2018-10-02 PROCEDURE — 25000128 H RX IP 250 OP 636: Performed by: ANESTHESIOLOGY

## 2018-10-02 PROCEDURE — 99207 ZZC CONSULT E&M CHANGED TO INITIAL LEVEL: CPT | Performed by: NURSE PRACTITIONER

## 2018-10-02 PROCEDURE — 71000013 ZZH RECOVERY PHASE 1 LEVEL 1 EA ADDTL HR: Performed by: THORACIC SURGERY (CARDIOTHORACIC VASCULAR SURGERY)

## 2018-10-02 PROCEDURE — 37000009 ZZH ANESTHESIA TECHNICAL FEE, EACH ADDTL 15 MIN: Performed by: THORACIC SURGERY (CARDIOTHORACIC VASCULAR SURGERY)

## 2018-10-02 PROCEDURE — 25000128 H RX IP 250 OP 636: Performed by: PHYSICIAN ASSISTANT

## 2018-10-02 PROCEDURE — 25000128 H RX IP 250 OP 636: Performed by: THORACIC SURGERY (CARDIOTHORACIC VASCULAR SURGERY)

## 2018-10-02 PROCEDURE — 12000007 ZZH R&B INTERMEDIATE

## 2018-10-02 PROCEDURE — 25000132 ZZH RX MED GY IP 250 OP 250 PS 637: Mod: GY | Performed by: PHYSICIAN ASSISTANT

## 2018-10-02 PROCEDURE — A9270 NON-COVERED ITEM OR SERVICE: HCPCS | Mod: GY | Performed by: PHYSICIAN ASSISTANT

## 2018-10-02 PROCEDURE — 25000125 ZZHC RX 250: Performed by: THORACIC SURGERY (CARDIOTHORACIC VASCULAR SURGERY)

## 2018-10-02 PROCEDURE — 25000132 ZZH RX MED GY IP 250 OP 250 PS 637: Mod: GY

## 2018-10-02 PROCEDURE — 37000008 ZZH ANESTHESIA TECHNICAL FEE, 1ST 30 MIN: Performed by: THORACIC SURGERY (CARDIOTHORACIC VASCULAR SURGERY)

## 2018-10-02 PROCEDURE — 80048 BASIC METABOLIC PNL TOTAL CA: CPT | Performed by: THORACIC SURGERY (CARDIOTHORACIC VASCULAR SURGERY)

## 2018-10-02 PROCEDURE — 36000093 ZZH SURGERY LEVEL 4 1ST 30 MIN: Performed by: THORACIC SURGERY (CARDIOTHORACIC VASCULAR SURGERY)

## 2018-10-02 PROCEDURE — 0BJK4ZZ INSPECTION OF RIGHT LUNG, PERCUTANEOUS ENDOSCOPIC APPROACH: ICD-10-PCS | Performed by: THORACIC SURGERY (CARDIOTHORACIC VASCULAR SURGERY)

## 2018-10-02 PROCEDURE — 25000566 ZZH SEVOFLURANE, EA 15 MIN: Performed by: THORACIC SURGERY (CARDIOTHORACIC VASCULAR SURGERY)

## 2018-10-02 PROCEDURE — 40000170 ZZH STATISTIC PRE-PROCEDURE ASSESSMENT II: Performed by: THORACIC SURGERY (CARDIOTHORACIC VASCULAR SURGERY)

## 2018-10-02 PROCEDURE — 71000012 ZZH RECOVERY PHASE 1 LEVEL 1 FIRST HR: Performed by: THORACIC SURGERY (CARDIOTHORACIC VASCULAR SURGERY)

## 2018-10-02 PROCEDURE — 88307 TISSUE EXAM BY PATHOLOGIST: CPT | Performed by: THORACIC SURGERY (CARDIOTHORACIC VASCULAR SURGERY)

## 2018-10-02 PROCEDURE — 85610 PROTHROMBIN TIME: CPT | Performed by: THORACIC SURGERY (CARDIOTHORACIC VASCULAR SURGERY)

## 2018-10-02 PROCEDURE — 88307 TISSUE EXAM BY PATHOLOGIST: CPT | Mod: 26 | Performed by: THORACIC SURGERY (CARDIOTHORACIC VASCULAR SURGERY)

## 2018-10-02 PROCEDURE — A9270 NON-COVERED ITEM OR SERVICE: HCPCS | Mod: GY | Performed by: NURSE PRACTITIONER

## 2018-10-02 PROCEDURE — 36000063 ZZH SURGERY LEVEL 4 EA 15 ADDTL MIN: Performed by: THORACIC SURGERY (CARDIOTHORACIC VASCULAR SURGERY)

## 2018-10-02 PROCEDURE — 40000986 XR CHEST PORT 1 VW

## 2018-10-02 PROCEDURE — 25000132 ZZH RX MED GY IP 250 OP 250 PS 637: Mod: GY | Performed by: THORACIC SURGERY (CARDIOTHORACIC VASCULAR SURGERY)

## 2018-10-02 PROCEDURE — 88331 PATH CONSLTJ SURG 1 BLK 1SPC: CPT | Mod: 26 | Performed by: THORACIC SURGERY (CARDIOTHORACIC VASCULAR SURGERY)

## 2018-10-02 PROCEDURE — 27110038 ZZH RX 271: Performed by: THORACIC SURGERY (CARDIOTHORACIC VASCULAR SURGERY)

## 2018-10-02 PROCEDURE — 99221 1ST HOSP IP/OBS SF/LOW 40: CPT | Performed by: NURSE PRACTITIONER

## 2018-10-02 PROCEDURE — 25000125 ZZHC RX 250: Performed by: PHYSICIAN ASSISTANT

## 2018-10-02 PROCEDURE — 85027 COMPLETE CBC AUTOMATED: CPT | Performed by: THORACIC SURGERY (CARDIOTHORACIC VASCULAR SURGERY)

## 2018-10-02 PROCEDURE — 84132 ASSAY OF SERUM POTASSIUM: CPT | Performed by: THORACIC SURGERY (CARDIOTHORACIC VASCULAR SURGERY)

## 2018-10-02 PROCEDURE — 25000128 H RX IP 250 OP 636

## 2018-10-02 PROCEDURE — A9270 NON-COVERED ITEM OR SERVICE: HCPCS | Mod: GY

## 2018-10-02 PROCEDURE — 40000885 ZZH STATISTIC STEP DOWN HRS EVENING

## 2018-10-02 PROCEDURE — 0BBF0ZZ EXCISION OF RIGHT LOWER LUNG LOBE, OPEN APPROACH: ICD-10-PCS | Performed by: THORACIC SURGERY (CARDIOTHORACIC VASCULAR SURGERY)

## 2018-10-02 PROCEDURE — 88331 PATH CONSLTJ SURG 1 BLK 1SPC: CPT | Performed by: THORACIC SURGERY (CARDIOTHORACIC VASCULAR SURGERY)

## 2018-10-02 PROCEDURE — 27210794 ZZH OR GENERAL SUPPLY STERILE: Performed by: THORACIC SURGERY (CARDIOTHORACIC VASCULAR SURGERY)

## 2018-10-02 PROCEDURE — 25000132 ZZH RX MED GY IP 250 OP 250 PS 637: Mod: GY | Performed by: NURSE PRACTITIONER

## 2018-10-02 PROCEDURE — 25000125 ZZHC RX 250

## 2018-10-02 RX ORDER — ALLOPURINOL 100 MG/1
100 TABLET ORAL DAILY
Status: DISCONTINUED | OUTPATIENT
Start: 2018-10-02 | End: 2018-10-04 | Stop reason: HOSPADM

## 2018-10-02 RX ORDER — ACETAMINOPHEN 325 MG/1
975 TABLET ORAL EVERY 8 HOURS
Status: DISCONTINUED | OUTPATIENT
Start: 2018-10-02 | End: 2018-10-04 | Stop reason: HOSPADM

## 2018-10-02 RX ORDER — FENTANYL CITRATE 50 UG/ML
25-50 INJECTION, SOLUTION INTRAMUSCULAR; INTRAVENOUS
Status: DISCONTINUED | OUTPATIENT
Start: 2018-10-02 | End: 2018-10-02 | Stop reason: HOSPADM

## 2018-10-02 RX ORDER — NALOXONE HYDROCHLORIDE 0.4 MG/ML
.1-.4 INJECTION, SOLUTION INTRAMUSCULAR; INTRAVENOUS; SUBCUTANEOUS
Status: DISCONTINUED | OUTPATIENT
Start: 2018-10-02 | End: 2018-10-02

## 2018-10-02 RX ORDER — NALOXONE HYDROCHLORIDE 0.4 MG/ML
.1-.4 INJECTION, SOLUTION INTRAMUSCULAR; INTRAVENOUS; SUBCUTANEOUS
Status: DISCONTINUED | OUTPATIENT
Start: 2018-10-02 | End: 2018-10-04 | Stop reason: HOSPADM

## 2018-10-02 RX ORDER — TIZANIDINE 2 MG/1
2 TABLET ORAL
Status: DISCONTINUED | OUTPATIENT
Start: 2018-10-02 | End: 2018-10-04 | Stop reason: HOSPADM

## 2018-10-02 RX ORDER — LIDOCAINE HYDROCHLORIDE 20 MG/ML
INJECTION, SOLUTION INFILTRATION; PERINEURAL PRN
Status: DISCONTINUED | OUTPATIENT
Start: 2018-10-02 | End: 2018-10-02

## 2018-10-02 RX ORDER — LIDOCAINE 40 MG/G
CREAM TOPICAL
Status: DISCONTINUED | OUTPATIENT
Start: 2018-10-02 | End: 2018-10-04 | Stop reason: HOSPADM

## 2018-10-02 RX ORDER — AMOXICILLIN 250 MG
1 CAPSULE ORAL 2 TIMES DAILY
Status: DISCONTINUED | OUTPATIENT
Start: 2018-10-02 | End: 2018-10-04 | Stop reason: HOSPADM

## 2018-10-02 RX ORDER — AMOXICILLIN 250 MG
2 CAPSULE ORAL 2 TIMES DAILY PRN
Status: DISCONTINUED | OUTPATIENT
Start: 2018-10-02 | End: 2018-10-04 | Stop reason: HOSPADM

## 2018-10-02 RX ORDER — SIMVASTATIN 40 MG
40 TABLET ORAL AT BEDTIME
Status: DISCONTINUED | OUTPATIENT
Start: 2018-10-02 | End: 2018-10-04 | Stop reason: HOSPADM

## 2018-10-02 RX ORDER — ONDANSETRON 2 MG/ML
4 INJECTION INTRAMUSCULAR; INTRAVENOUS EVERY 6 HOURS PRN
Status: DISCONTINUED | OUTPATIENT
Start: 2018-10-02 | End: 2018-10-04 | Stop reason: HOSPADM

## 2018-10-02 RX ORDER — ONDANSETRON 4 MG/1
4 TABLET, ORALLY DISINTEGRATING ORAL EVERY 30 MIN PRN
Status: DISCONTINUED | OUTPATIENT
Start: 2018-10-02 | End: 2018-10-02 | Stop reason: HOSPADM

## 2018-10-02 RX ORDER — METOPROLOL SUCCINATE 25 MG/1
25 TABLET, EXTENDED RELEASE ORAL DAILY
Status: DISCONTINUED | OUTPATIENT
Start: 2018-10-03 | End: 2018-10-04 | Stop reason: HOSPADM

## 2018-10-02 RX ORDER — POLYETHYLENE GLYCOL 3350 17 G/17G
17 POWDER, FOR SOLUTION ORAL DAILY PRN
Status: DISCONTINUED | OUTPATIENT
Start: 2018-10-02 | End: 2018-10-04 | Stop reason: HOSPADM

## 2018-10-02 RX ORDER — HYDRALAZINE HYDROCHLORIDE 20 MG/ML
2.5-5 INJECTION INTRAMUSCULAR; INTRAVENOUS EVERY 10 MIN PRN
Status: DISCONTINUED | OUTPATIENT
Start: 2018-10-02 | End: 2018-10-02 | Stop reason: HOSPADM

## 2018-10-02 RX ORDER — ALBUTEROL SULFATE 0.83 MG/ML
2.5 SOLUTION RESPIRATORY (INHALATION) EVERY 4 HOURS PRN
Status: DISCONTINUED | OUTPATIENT
Start: 2018-10-02 | End: 2018-10-02 | Stop reason: HOSPADM

## 2018-10-02 RX ORDER — NEOSTIGMINE METHYLSULFATE 1 MG/ML
VIAL (ML) INJECTION PRN
Status: DISCONTINUED | OUTPATIENT
Start: 2018-10-02 | End: 2018-10-02

## 2018-10-02 RX ORDER — AMOXICILLIN 250 MG
2 CAPSULE ORAL 2 TIMES DAILY
Status: DISCONTINUED | OUTPATIENT
Start: 2018-10-02 | End: 2018-10-04 | Stop reason: HOSPADM

## 2018-10-02 RX ORDER — NITROGLYCERIN 0.4 MG/1
0.4 TABLET SUBLINGUAL EVERY 5 MIN PRN
Status: DISCONTINUED | OUTPATIENT
Start: 2018-10-02 | End: 2018-10-04 | Stop reason: HOSPADM

## 2018-10-02 RX ORDER — DEXAMETHASONE SODIUM PHOSPHATE 4 MG/ML
INJECTION, SOLUTION INTRA-ARTICULAR; INTRALESIONAL; INTRAMUSCULAR; INTRAVENOUS; SOFT TISSUE PRN
Status: DISCONTINUED | OUTPATIENT
Start: 2018-10-02 | End: 2018-10-02

## 2018-10-02 RX ORDER — FENTANYL CITRATE 50 UG/ML
INJECTION, SOLUTION INTRAMUSCULAR; INTRAVENOUS PRN
Status: DISCONTINUED | OUTPATIENT
Start: 2018-10-02 | End: 2018-10-02

## 2018-10-02 RX ORDER — SODIUM CHLORIDE, SODIUM LACTATE, POTASSIUM CHLORIDE, CALCIUM CHLORIDE 600; 310; 30; 20 MG/100ML; MG/100ML; MG/100ML; MG/100ML
INJECTION, SOLUTION INTRAVENOUS CONTINUOUS
Status: DISCONTINUED | OUTPATIENT
Start: 2018-10-02 | End: 2018-10-02 | Stop reason: HOSPADM

## 2018-10-02 RX ORDER — BISACODYL 10 MG
10 SUPPOSITORY, RECTAL RECTAL DAILY PRN
Status: DISCONTINUED | OUTPATIENT
Start: 2018-10-02 | End: 2018-10-04 | Stop reason: HOSPADM

## 2018-10-02 RX ORDER — SPIRONOLACTONE 50 MG/1
50 TABLET, FILM COATED ORAL DAILY
Status: DISCONTINUED | OUTPATIENT
Start: 2018-10-02 | End: 2018-10-04 | Stop reason: HOSPADM

## 2018-10-02 RX ORDER — FUROSEMIDE 20 MG
20 TABLET ORAL AT BEDTIME
Status: DISCONTINUED | OUTPATIENT
Start: 2018-10-02 | End: 2018-10-02

## 2018-10-02 RX ORDER — HYDROMORPHONE HYDROCHLORIDE 1 MG/ML
.3-.5 INJECTION, SOLUTION INTRAMUSCULAR; INTRAVENOUS; SUBCUTANEOUS EVERY 5 MIN PRN
Status: DISCONTINUED | OUTPATIENT
Start: 2018-10-02 | End: 2018-10-02 | Stop reason: HOSPADM

## 2018-10-02 RX ORDER — FUROSEMIDE 20 MG
20 TABLET ORAL AT BEDTIME
Status: DISCONTINUED | OUTPATIENT
Start: 2018-10-03 | End: 2018-10-04 | Stop reason: HOSPADM

## 2018-10-02 RX ORDER — AMOXICILLIN 250 MG
1 CAPSULE ORAL 2 TIMES DAILY PRN
Status: DISCONTINUED | OUTPATIENT
Start: 2018-10-02 | End: 2018-10-04 | Stop reason: HOSPADM

## 2018-10-02 RX ORDER — HYDRALAZINE HYDROCHLORIDE 20 MG/ML
10 INJECTION INTRAMUSCULAR; INTRAVENOUS EVERY 4 HOURS PRN
Status: DISCONTINUED | OUTPATIENT
Start: 2018-10-02 | End: 2018-10-04 | Stop reason: HOSPADM

## 2018-10-02 RX ORDER — GINSENG 100 MG
CAPSULE ORAL 3 TIMES DAILY
Status: DISCONTINUED | OUTPATIENT
Start: 2018-10-02 | End: 2018-10-04 | Stop reason: HOSPADM

## 2018-10-02 RX ORDER — LABETALOL HYDROCHLORIDE 5 MG/ML
10 INJECTION, SOLUTION INTRAVENOUS
Status: DISCONTINUED | OUTPATIENT
Start: 2018-10-02 | End: 2018-10-02 | Stop reason: HOSPADM

## 2018-10-02 RX ORDER — ACETAMINOPHEN 500 MG
1000 TABLET ORAL ONCE
Status: DISCONTINUED | OUTPATIENT
Start: 2018-10-02 | End: 2018-10-02 | Stop reason: HOSPADM

## 2018-10-02 RX ORDER — DIPHENHYDRAMINE HCL 12.5MG/5ML
12.5 LIQUID (ML) ORAL EVERY 6 HOURS PRN
Status: DISCONTINUED | OUTPATIENT
Start: 2018-10-02 | End: 2018-10-02

## 2018-10-02 RX ORDER — CARBOXYMETHYLCELLULOSE SODIUM 5 MG/ML
1 SOLUTION/ DROPS OPHTHALMIC DAILY
Status: DISCONTINUED | OUTPATIENT
Start: 2018-10-03 | End: 2018-10-04 | Stop reason: HOSPADM

## 2018-10-02 RX ORDER — SODIUM CHLORIDE 9 MG/ML
INJECTION, SOLUTION INTRAVENOUS CONTINUOUS
Status: DISCONTINUED | OUTPATIENT
Start: 2018-10-02 | End: 2018-10-03

## 2018-10-02 RX ORDER — CALCITRIOL 0.25 UG/1
0.25 CAPSULE, LIQUID FILLED ORAL EVERY OTHER DAY
Status: DISCONTINUED | OUTPATIENT
Start: 2018-10-03 | End: 2018-10-04 | Stop reason: HOSPADM

## 2018-10-02 RX ORDER — CEFAZOLIN SODIUM 1 G/3ML
1 INJECTION, POWDER, FOR SOLUTION INTRAMUSCULAR; INTRAVENOUS SEE ADMIN INSTRUCTIONS
Status: DISCONTINUED | OUTPATIENT
Start: 2018-10-02 | End: 2018-10-02 | Stop reason: HOSPADM

## 2018-10-02 RX ORDER — ONDANSETRON 2 MG/ML
4 INJECTION INTRAMUSCULAR; INTRAVENOUS EVERY 30 MIN PRN
Status: DISCONTINUED | OUTPATIENT
Start: 2018-10-02 | End: 2018-10-02 | Stop reason: HOSPADM

## 2018-10-02 RX ORDER — ONDANSETRON 2 MG/ML
INJECTION INTRAMUSCULAR; INTRAVENOUS PRN
Status: DISCONTINUED | OUTPATIENT
Start: 2018-10-02 | End: 2018-10-02

## 2018-10-02 RX ORDER — GLYCOPYRROLATE 0.2 MG/ML
INJECTION, SOLUTION INTRAMUSCULAR; INTRAVENOUS PRN
Status: DISCONTINUED | OUTPATIENT
Start: 2018-10-02 | End: 2018-10-02

## 2018-10-02 RX ORDER — ONDANSETRON 4 MG/1
4 TABLET, ORALLY DISINTEGRATING ORAL EVERY 6 HOURS PRN
Status: DISCONTINUED | OUTPATIENT
Start: 2018-10-02 | End: 2018-10-04 | Stop reason: HOSPADM

## 2018-10-02 RX ORDER — MAGNESIUM HYDROXIDE 1200 MG/15ML
LIQUID ORAL PRN
Status: DISCONTINUED | OUTPATIENT
Start: 2018-10-02 | End: 2018-10-02 | Stop reason: HOSPADM

## 2018-10-02 RX ORDER — FUROSEMIDE 40 MG
40 TABLET ORAL EVERY MORNING
Status: DISCONTINUED | OUTPATIENT
Start: 2018-10-02 | End: 2018-10-04 | Stop reason: HOSPADM

## 2018-10-02 RX ORDER — LEVOTHYROXINE SODIUM 125 UG/1
125 TABLET ORAL
Status: DISCONTINUED | OUTPATIENT
Start: 2018-10-04 | End: 2018-10-04 | Stop reason: HOSPADM

## 2018-10-02 RX ORDER — CEFAZOLIN SODIUM 2 G/100ML
2 INJECTION, SOLUTION INTRAVENOUS
Status: COMPLETED | OUTPATIENT
Start: 2018-10-02 | End: 2018-10-02

## 2018-10-02 RX ORDER — PROPOFOL 10 MG/ML
INJECTION, EMULSION INTRAVENOUS CONTINUOUS PRN
Status: DISCONTINUED | OUTPATIENT
Start: 2018-10-02 | End: 2018-10-02

## 2018-10-02 RX ORDER — INHALER, ASSIST DEVICES
1 SPACER (EA) MISCELLANEOUS ONCE
Status: DISCONTINUED | OUTPATIENT
Start: 2018-10-02 | End: 2018-10-04 | Stop reason: HOSPADM

## 2018-10-02 RX ORDER — PROCHLORPERAZINE MALEATE 5 MG
5 TABLET ORAL EVERY 6 HOURS PRN
Status: DISCONTINUED | OUTPATIENT
Start: 2018-10-02 | End: 2018-10-04 | Stop reason: HOSPADM

## 2018-10-02 RX ORDER — ALBUTEROL SULFATE 90 UG/1
2 AEROSOL, METERED RESPIRATORY (INHALATION) EVERY 6 HOURS PRN
Status: DISCONTINUED | OUTPATIENT
Start: 2018-10-02 | End: 2018-10-04 | Stop reason: HOSPADM

## 2018-10-02 RX ORDER — HYDROMORPHONE HYDROCHLORIDE 1 MG/ML
0.2 INJECTION, SOLUTION INTRAMUSCULAR; INTRAVENOUS; SUBCUTANEOUS
Status: DISCONTINUED | OUTPATIENT
Start: 2018-10-02 | End: 2018-10-04 | Stop reason: HOSPADM

## 2018-10-02 RX ORDER — PROPOFOL 10 MG/ML
INJECTION, EMULSION INTRAVENOUS PRN
Status: DISCONTINUED | OUTPATIENT
Start: 2018-10-02 | End: 2018-10-02

## 2018-10-02 RX ORDER — ACETAMINOPHEN 325 MG/1
650 TABLET ORAL EVERY 4 HOURS PRN
Status: DISCONTINUED | OUTPATIENT
Start: 2018-10-05 | End: 2018-10-04 | Stop reason: HOSPADM

## 2018-10-02 RX ORDER — CALCIUM CARBONATE 500 MG/1
1000 TABLET, CHEWABLE ORAL 4 TIMES DAILY PRN
Status: DISCONTINUED | OUTPATIENT
Start: 2018-10-02 | End: 2018-10-04 | Stop reason: HOSPADM

## 2018-10-02 RX ORDER — DIPHENHYDRAMINE HYDROCHLORIDE 50 MG/ML
12.5 INJECTION INTRAMUSCULAR; INTRAVENOUS EVERY 6 HOURS PRN
Status: DISCONTINUED | OUTPATIENT
Start: 2018-10-02 | End: 2018-10-02

## 2018-10-02 RX ADMIN — Medication 0.2 MG: at 13:14

## 2018-10-02 RX ADMIN — DEXAMETHASONE SODIUM PHOSPHATE 8 MG: 4 INJECTION, SOLUTION INTRA-ARTICULAR; INTRALESIONAL; INTRAMUSCULAR; INTRAVENOUS; SOFT TISSUE at 07:45

## 2018-10-02 RX ADMIN — ONDANSETRON 4 MG: 2 INJECTION INTRAMUSCULAR; INTRAVENOUS at 12:52

## 2018-10-02 RX ADMIN — FENTANYL CITRATE 25 MCG: 50 INJECTION, SOLUTION INTRAMUSCULAR; INTRAVENOUS at 07:24

## 2018-10-02 RX ADMIN — Medication 1 LOZENGE: at 20:06

## 2018-10-02 RX ADMIN — CEFAZOLIN SODIUM 2 G: 2 INJECTION, SOLUTION INTRAVENOUS at 07:44

## 2018-10-02 RX ADMIN — Medication 0.5 MG: at 09:41

## 2018-10-02 RX ADMIN — HYDROMORPHONE HYDROCHLORIDE 0.5 MG: 1 INJECTION, SOLUTION INTRAMUSCULAR; INTRAVENOUS; SUBCUTANEOUS at 09:08

## 2018-10-02 RX ADMIN — SODIUM CHLORIDE: 9 INJECTION, SOLUTION INTRAVENOUS at 12:55

## 2018-10-02 RX ADMIN — SPIRONOLACTONE 50 MG: 50 TABLET, FILM COATED ORAL at 14:13

## 2018-10-02 RX ADMIN — PHENYLEPHRINE HYDROCHLORIDE 100 MCG: 10 INJECTION, SOLUTION INTRAMUSCULAR; INTRAVENOUS; SUBCUTANEOUS at 08:20

## 2018-10-02 RX ADMIN — ACETAMINOPHEN 975 MG: 325 TABLET, FILM COATED ORAL at 20:05

## 2018-10-02 RX ADMIN — LIDOCAINE HYDROCHLORIDE 100 MG: 20 INJECTION, SOLUTION INFILTRATION; PERINEURAL at 07:39

## 2018-10-02 RX ADMIN — Medication 1 MG: at 22:46

## 2018-10-02 RX ADMIN — PROPOFOL 100 MG: 10 INJECTION, EMULSION INTRAVENOUS at 07:41

## 2018-10-02 RX ADMIN — NEOSTIGMINE METHYLSULFATE 4 MG: 1 INJECTION, SOLUTION INTRAVENOUS at 08:52

## 2018-10-02 RX ADMIN — SODIUM CHLORIDE, POTASSIUM CHLORIDE, SODIUM LACTATE AND CALCIUM CHLORIDE: 600; 310; 30; 20 INJECTION, SOLUTION INTRAVENOUS at 08:27

## 2018-10-02 RX ADMIN — FENTANYL CITRATE 50 MCG: 50 INJECTION INTRAMUSCULAR; INTRAVENOUS at 10:52

## 2018-10-02 RX ADMIN — ACETAMINOPHEN 975 MG: 325 TABLET, FILM COATED ORAL at 13:52

## 2018-10-02 RX ADMIN — FENTANYL CITRATE 25 MCG: 50 INJECTION, SOLUTION INTRAMUSCULAR; INTRAVENOUS at 09:06

## 2018-10-02 RX ADMIN — ALLOPURINOL 100 MG: 100 TABLET ORAL at 14:13

## 2018-10-02 RX ADMIN — PHENYLEPHRINE HYDROCHLORIDE 100 MCG: 10 INJECTION, SOLUTION INTRAMUSCULAR; INTRAVENOUS; SUBCUTANEOUS at 08:15

## 2018-10-02 RX ADMIN — RANITIDINE 150 MG: 150 TABLET ORAL at 20:06

## 2018-10-02 RX ADMIN — SENNOSIDES AND DOCUSATE SODIUM 2 TABLET: 8.6; 5 TABLET ORAL at 20:06

## 2018-10-02 RX ADMIN — SIMVASTATIN 40 MG: 40 TABLET, FILM COATED ORAL at 22:02

## 2018-10-02 RX ADMIN — Medication 0.5 MG: at 10:04

## 2018-10-02 RX ADMIN — PHENYLEPHRINE HYDROCHLORIDE 100 MCG: 10 INJECTION, SOLUTION INTRAMUSCULAR; INTRAVENOUS; SUBCUTANEOUS at 08:19

## 2018-10-02 RX ADMIN — ROCURONIUM BROMIDE 50 MG: 10 INJECTION INTRAVENOUS at 07:39

## 2018-10-02 RX ADMIN — FUROSEMIDE 40 MG: 40 TABLET ORAL at 14:13

## 2018-10-02 RX ADMIN — PROPOFOL 100 MG: 10 INJECTION, EMULSION INTRAVENOUS at 07:39

## 2018-10-02 RX ADMIN — ONDANSETRON 4 MG: 2 INJECTION INTRAMUSCULAR; INTRAVENOUS at 08:31

## 2018-10-02 RX ADMIN — PHENYLEPHRINE HYDROCHLORIDE 100 MCG: 10 INJECTION, SOLUTION INTRAMUSCULAR; INTRAVENOUS; SUBCUTANEOUS at 08:07

## 2018-10-02 RX ADMIN — FENTANYL CITRATE 75 MCG: 50 INJECTION, SOLUTION INTRAMUSCULAR; INTRAVENOUS at 08:00

## 2018-10-02 RX ADMIN — GLYCOPYRROLATE 0.8 MG: 0.2 INJECTION, SOLUTION INTRAMUSCULAR; INTRAVENOUS at 08:52

## 2018-10-02 RX ADMIN — Medication: at 09:00

## 2018-10-02 RX ADMIN — Medication 0.5 MG: at 09:25

## 2018-10-02 RX ADMIN — BACITRACIN: 500 OINTMENT TOPICAL at 16:48

## 2018-10-02 RX ADMIN — Medication 1 MG: at 22:05

## 2018-10-02 RX ADMIN — TIZANIDINE 2 MG: 2 TABLET ORAL at 13:51

## 2018-10-02 RX ADMIN — PROPOFOL 30 MCG/KG/MIN: 10 INJECTION, EMULSION INTRAVENOUS at 07:43

## 2018-10-02 RX ADMIN — FENTANYL CITRATE 25 MCG: 50 INJECTION, SOLUTION INTRAMUSCULAR; INTRAVENOUS at 08:54

## 2018-10-02 RX ADMIN — SODIUM CHLORIDE, POTASSIUM CHLORIDE, SODIUM LACTATE AND CALCIUM CHLORIDE: 600; 310; 30; 20 INJECTION, SOLUTION INTRAVENOUS at 07:39

## 2018-10-02 RX ADMIN — Medication 1 MG: at 16:47

## 2018-10-02 RX ADMIN — PHENYLEPHRINE HYDROCHLORIDE 100 MCG: 10 INJECTION, SOLUTION INTRAMUSCULAR; INTRAVENOUS; SUBCUTANEOUS at 08:13

## 2018-10-02 RX ADMIN — FENTANYL CITRATE 100 MCG: 50 INJECTION, SOLUTION INTRAMUSCULAR; INTRAVENOUS at 07:39

## 2018-10-02 ASSESSMENT — LIFESTYLE VARIABLES: TOBACCO_USE: 1

## 2018-10-02 ASSESSMENT — ACTIVITIES OF DAILY LIVING (ADL)
ADLS_ACUITY_SCORE: 11
ADLS_ACUITY_SCORE: 15
ADLS_ACUITY_SCORE: 15

## 2018-10-02 ASSESSMENT — COPD QUESTIONNAIRES: COPD: 1

## 2018-10-02 ASSESSMENT — ENCOUNTER SYMPTOMS
DYSRHYTHMIAS: 0
SEIZURES: 0

## 2018-10-02 NOTE — ANESTHESIA PREPROCEDURE EVALUATION
Procedure: Procedure(s):  THORACOSCOPIC WEDGE RESECTION LUNG  Preop diagnosis: right lower lobe lung nodule    Allergies   Allergen Reactions     Aspirin      Gabapentin      Confusion     Sulfa Drugs      Tramadol      Nausea and Vomiting       Zithromax [Azithromycin Dihydrate]      Diarrhea.      Zovirax      Mood swings. Suicidal.     No past medical history on file.  Past Surgical History:   Procedure Laterality Date     AORTO BI ILIAC BYPASS Bilateral 1999     APPENDECTOMY       CHOLECYSTECTOMY       HYSTERECTOMY, PAP NO LONGER INDICATED  1976    no oophorectomy     JOINT REPLACEMENT, HIP RT/LT Bilateral     Joint Replacement Hip RT/LT     MASTECTOMY, SIMPLE RT/LT/DARLENE Bilateral 1974    fibrocystic disease     SPINE SURGERY  1972,1982     THYROIDECTOMY       goiter     Prior to Admission medications    Medication Sig Start Date End Date Taking? Authorizing Provider   LEVOTHYROXINE SODIUM PO Take 125 mcg by mouth See Admin Instructions 6 times per week   Yes Reported, Patient   LEVOTHYROXINE SODIUM PO Take 62.5 mcg by mouth once a week (Takes 0.5 x 125mcg tablet = 62.5mcg dose)   Yes Reported, Patient   albuterol (PROAIR HFA/PROVENTIL HFA/VENTOLIN HFA) 108 (90 Base) MCG/ACT inhaler Inhale 2 puffs into the lungs every 6 hours as needed for shortness of breath / dyspnea or wheezing 8/21/18   Cody Obando MD   allopurinol (ZYLOPRIM) 100 MG tablet Take 1 tablet (100 mg) by mouth daily 8/22/18   Cody Obando MD   calcitRIOL (ROCALTROL) 0.25 MCG capsule Take 1 capsule by mouth every other day. 5/15/13   Cody Obando MD   clopidogrel (PLAVIX) 75 MG tablet TAKE 1 TABLET EVERY DAY 7/26/18   Cody Obando MD   diclofenac (VOLTAREN) 1 % GEL Apply 2 grams to hands four times daily prn using enclosed dosing card. 7/19/16   Cody Obando MD   EPINEPHrine (EPIPEN) 0.3 MG/0.3ML injection Inject 0.3 mLs (0.3 mg) into the muscle once as needed for anaphylaxis 6/26/14   Delia Nixon MD   famciclovir  (FAMVIR) 500 MG tablet TAKE 1 TABLET TWICE DAILY 9/20/18   Cody Obando MD   metoprolol succinate (TOPROL-XL) 25 MG 24 hr tablet TAKE 1 TABLET EVERY DAY 8/21/18   Cody Obando MD   multivitamin  with lutein (OCUVITE WITH LTEIN) CAPS Take 1 capsule by mouth daily    Reported, Patient   predniSONE (DELTASONE) 20 MG tablet TAKE ONE TABLET BY MOUTH TWICE DAILY ONLY WHEN NEEDED FOR GOUT 8/21/18   Cody Obando MD   simvastatin (ZOCOR) 40 MG tablet TAKE 1 TABLET EVERY DAY 8/21/18   Cody Obando MD   spironolactone (ALDACTONE) 50 MG tablet Take 1 tablet (50 mg) by mouth daily 7/13/17   Cody Obando MD     No current Epic-ordered facility-administered medications on file.      No current [facility]-ordered outpatient prescriptions on file.     Wt Readings from Last 1 Encounters:   08/21/18 83.5 kg (184 lb)     Temp Readings from Last 1 Encounters:   08/21/18 36.6  C (97.8  F)     BP Readings from Last 6 Encounters:   08/21/18 130/62   07/12/17 124/62   08/30/16 122/68   06/28/16 134/72   06/23/15 128/68   06/17/14 120/80     Pulse Readings from Last 4 Encounters:   08/21/18 71   07/12/17 80   08/30/16 60   06/28/16 63     Resp Readings from Last 1 Encounters:   08/21/18 20     SpO2 Readings from Last 1 Encounters:   08/21/18 99%     Recent Labs   Lab Test  08/21/18   1038  07/12/17   1229   NA  141  140   POTASSIUM  3.8  3.8   CHLORIDE  103  103   CO2  26  23   ANIONGAP  12  14   GLC  112*  102*   BUN  46*  54*   CR  2.01*  2.03*   OSBALDO  9.1  9.2     Recent Labs   Lab Test  08/21/18   1038  07/12/17   1229   WBC  5.6  6.8   HGB  14.0  13.8   PLT  187  182     RECENT LABS:   ECG:   ECHO:   CXR:        Anesthesia Evaluation     .             ROS/MED HX    ENT/Pulmonary:     (+)tobacco use, Past use COPD, , . .   (-) asthma, sleep apnea and recent URI   Neurologic:      (-) seizures, CVA and migraines   Cardiovascular:     (+) Dyslipidemia, hypertension-Peripheral Vascular Disease-- Symptomatic, --. : . . . :. .       (-) CAD, HALL, arrhythmias and valvular problems/murmurs   METS/Exercise Tolerance:  >4 METS   Hematologic:        (-) history of blood clots, anemia and other hematologic disorder   Musculoskeletal:   (+) arthritis, , , other musculoskeletal- gout      GI/Hepatic:        (-) GERD and liver disease   Renal/Genitourinary:     (+) chronic renal disease, type: CRI,    (-) nephrolithiasis   Endo:     (+) thyroid problem .   (-) Type I DM, Type II DM and obesity   Psychiatric:         Infectious Disease:        (-) Recent Fever   Malignancy:         Other:                     Physical Exam  Normal systems: cardiovascular    Airway   Mallampati: II  TM distance: >3 FB  Neck ROM: full    Dental   (+) caps    Cardiovascular   Rhythm and rate: regular and normal  (-) no murmur    Pulmonary (+) decreased breath sounds                       Anesthesia Plan      History & Physical Review      ASA Status:  3 .    NPO Status:  > 8 hours    Plan for General and ETT with Propofol induction. Maintenance will be Balanced.    PONV prophylaxis:  Ondansetron (or other 5HT-3) and Dexamethasone or Solumedrol  Additional equipment: Double Lumen ETT 37 DL-ETT  Background propofol infusion  8 mg decadron      Postoperative Care  Postoperative pain management:  Multi-modal analgesia.      Consents  Anesthetic plan, risks, benefits and alternatives discussed with:  Patient..

## 2018-10-02 NOTE — IP AVS SNAPSHOT
MRN:[id number]                      After Visit Summary   10/2/2018    Marina Harrison    MRN: [id number]           Thank you!     Thank you for choosing [facility] for your care. Our goal is always to provide you with excellent care. Hearing back from our patients is one way we can continue to improve our services. Please take a few minutes to complete the written survey that you may receive in the mail after you visit with us. Thank you!        Patient Information     Date Of Birth          1940        Designated Caregiver       Most Recent Value    Caregiver    Will someone help with your care after discharge? yes    Name of designated caregiver Lis    Phone number of caregiver     Caregiver address patient unaware      About your hospital stay     You were admitted on:  October 2, 2018 You last received care in the:  [place] [facility] [address] [facility]    You were discharged on:  October 4, 2018        Reason for your hospital stay       Dr. Jaquez removed the lung cancer in the right lower lobe of your lung (Final Stage IB).                  Who to Call     For medical emergencies, please call 911.  For non-urgent questions about your medical care, please call your primary care provider or clinic, [phone]  For questions related to your surgery, please call your surgery clinic        Attending Provider     Provider Specialty    Levy Jaquez MD Thoracic Diseases       Primary Care Provider Office Phone # Fax #    Cody Obando [phone]      After Care Instructions     Patient care order       Hold your metoprolol, lasix and spironolactone until seen in clinic for blood pressure check.  If you monitor blood pressure at home, you may resume these medications if your blood pressure has been >110 (top number) for 2 days in a row.                  Your next 10 appointments already scheduled     Oct 16, 2018   Procedure with Levy  Edwar Jaquez MD   [facility] (--)    [address], [address]  [facility] [address] [phone]              Further instructions from your care team       [facility]  Discharge Orders & Follow-up Care  Video-Assisted Thoracoscopy or Thoracotomy    You already have your follow-up appointments scheduled for you:  Please go to [facility] for a chest x-ray at __1:20 pm on Monday, October 15_. [facility] located in the same building ([facility], [address] [place] [address], [place] [address]) as Dr. Jaquez' office. They are in [address].  Please then go for your post-operative follow-up appointment in Dr. Jaquez' office, on the second floor of the [facility], [address] at _1:40 pm on Monday, October 15_. You will see either Jaonna Lala PA-C or Dr. Jaquez during your appointment.      Call Bhumika at Dr. Jaquez  office at [phone] for scheduling questions    A. Patient Care:  Call Dr Jaquez  office @ [phone] if you experience:  *Severe chills or a fever or 101 F or higher on two occasions  *Increased incisional pain that cannot be relieved with rest or pain medications  *Presence of unusual incisional or chest tube site drainage that is odorous, green or yellow in color, or if your incision is warm, red or swollen  *Coughing up bright red blood or greenish-yellow secretions  *Chest pain that gets worse with deep breathing or a significant increase in shortness of breath  *Inability to urinate or have a bowel movement  *New pain or swelling in your legs    In an emergency, call [phone] or have someone drive you to the nearest Emergency Department    Pain Relief:  You may have been given a prescription for narcotic pain medicine.  You may also take acetaminophen either as a new prescription  or over the counter.  Recommended dosages:  600 650 mg Acetaminophen every 4 hours as needed.  Many patients get good pain relief  "by \"staggering\" these medications.     Constipation:  Narcotic pain medication, general anesthesia, and time in the hospital with less activity than normal can all cause constipation. Please take a stool softener (what you have at home or one that was prescribed during hospital discharge, such as Senokot-S, docusate sodium, Miralax, Milk of Magnesia) while you are taking narcotics to prevent constipation. Stop taking the stool softener once you are done taking narcotics or if you begin having loose stools/diarrhea. Please call our clinic nurse, Gricel, at [phone] if you are not having success (not having BMs) with your current stool softener.     No driving while on narcotics.     Activity:  _XXX__ No heavy lifting greater than 8-10 pounds on the operative side for 4-6 weeks for a thoracotomy    Wound Care:  *You should look at your incision each day and keep it clean while it heals  *Do not apply any creams, salves such as Bacitracin, or ointments on the incision while it is healing  * Steri strips (thin white paper strips) will be present on the incision(s) and they will peel off as your incision heals-- otherwise, they will be removed at your post-op appointment.    *Remove the dressings covering your chest tube site 48 hours (Saturday, October 6) after your discharge from the hospital. You may then shower.  Wash the incision and chest tube site(s) daily with soap and water. No bathing or immersing incision underwater for approximately 2 weeks or until the chest tube sites are completely healed.     Place a dry gauze dressing (and tape) over the chest tube site because it is normal to have some drainage for a few days after the chest tube is removed.  Do not be alarmed if a large amount of fluid drains (should be pink or yellow) either spontaneously or with coughing or exertion. If this happens, just place a larger dry gauze dressing over the chest tube site-- it will stop and scab over in about a week or " so. Once the drainage stops, you can stop covering the chest tube site with a dressing. Call our office if the drainage is milky or green in color or foul-smelling.    B. Respiratory:  _XXX__ Utilize Incentive spirometer and flutter valve/acapella (if you received one) 10 times in a row every few hours while awake for a few weeks after discharging home from the hospital    C. Activity:  It may take a few months to regain your normal energy level/stamina. It is important during your recovery to get regular physical activity:  *walk each day at a comfortable pace  *climb stairs as tolerated  *take some rest periods each day but try not to take too many long naps, as this can affect your sleep at night    D. Returning to Work:  Time away from work will depend on your situation. In general, you will need between 1-6 weeks to recover from surgery. Specific dates for returning to work can be discussed at your post-op appointments.       Directions for On-Q Pain Pump Removal:  1. Remove the dressing covering the catheter site.  2. Grasp the catheter close to the skin and gently pull on the catheter. It should be easy to remove and not painful. If it becomes hard to remove or stretches, then stop and call   office.  3. Do not cut or pull hard to remove the catheter.  4. After you remove the catheter, check the catheter tip for a black marking to ensure the entire catheter was removed. Call our office if you don't see the black marking.  5. Place a band-aid over the catheter site if needed.  6. Call our office is you have redness, warmth or excessive bleeding from the catheter site or if there is a large bruise or swollen area around the site.    Revised May 2018    *Restart your Plavix tomorrow, 10/06/18 and then stop taking it as of 10/9/18      Pending Results     No orders found from 9/30/2018 to 10/3/2018.            Statement of Approval     Ordered          10/04/18 1316  I have reviewed and agree with all  the recommendations and orders detailed in this document.  EFFECTIVE NOW     Approved and electronically signed by:  Joanna Lala PA-C             Admission Information     Date & Time Provider Department Dept. Phone    10/2/2018 Levy Jaquez MD [place] [address] [facility] [phone]      Your Vitals Were     Blood Pressure Pulse Temperature Respirations Pulse Oximetry       105/56 (BP Location: Left arm) 58 97.4  F (36.3  C) (Oral) 18 96%       MyChart Information     [employer] gives you secure access to your electronic health record. If you see a primary care provider, you can also send messages to your care team and make appointments. If you have questions, please call your primary care clinic.  If you do not have a primary care provider, please call [phone] and they will assist you.        Care EveryWhere ID     This is your Care EveryWhere ID. This could be used by other organizations to access your [facility] medical records  [id number]        Equal Access to Services     KRYS BRUNNER : Hadii aad ku hadasho Sohawa, waaxda luqadaha, qaybta kaalmada adeegyada, aroldo saunders . So [facility] [phone].    ATENCIÓN: Si habla español, tiene a bolaños disposición servicios gratuitos de asistencia lingüística. Llame al [phone].    We comply with applicable federal civil rights laws and [place] laws. We do not discriminate on the basis of race, color, national origin, age, disability, sex, sexual orientation, or gender identity.               Review of your medicines      START taking        Dose / Directions    HYDROmorphone 2 MG tablet   Commonly known as:  DILAUDID   Used for:  Post-thoracotomy pain        Dose:  1-2 mg   Take 0.5-1 tablets (1-2 mg) by mouth every 6 hours as needed for moderate to severe pain (pain control or improvement in physical function. Hold dose for analgesic side effects.)   Quantity:  28 tablet   Refills:  0        senna-docusate 8.6-50 MG per tablet   Commonly known as:  SENOKOT-S;PERICOLACE   Used for:  Constipation due to opioid therapy        Dose:  1-2 tablet   Take 1-2 tablets by mouth 2 times daily as needed for constipation   Quantity:  30 tablet   Refills:  0         CONTINUE these medicines which may have CHANGED, or have new prescriptions. If we are uncertain of the size of tablets/capsules you have at home, strength may be listed as something that might have changed.        Dose / Directions    diclofenac 1 % Gel topical gel   Commonly known as:  VOLTAREN   This may have changed:    - how much to take  - how to take this  - when to take this  - reasons to take this  - additional instructions   Used for:  Hand joint pain, unspecified laterality        Apply 2 grams to hands four times daily prn using enclosed dosing card.   Quantity:  100 g   Refills:  5       famciclovir 500 MG tablet   Commonly known as:  FAMVIR   This may have changed:  See the new instructions.   Used for:  Herpes zoster without complication        TAKE 1 TABLET TWICE DAILY   Quantity:  14 tablet   Refills:  3       predniSONE 20 MG tablet   Commonly known as:  DELTASONE   This may have changed:    - how to take this  - when to take this  - reasons to take this  - additional instructions   Used for:  Podagra        TAKE ONE TABLET BY MOUTH TWICE DAILY ONLY WHEN NEEDED FOR GOUT   Quantity:  10 tablet   Refills:  3       simvastatin 40 MG tablet   Commonly known as:  ZOCOR   This may have changed:    - how much to take  - how to take this  - when to take this  - additional instructions   Used for:  Hyperlipidemia LDL goal <100        TAKE 1 TABLET EVERY DAY   Quantity:  90 tablet   Refills:  3       TYLENOL 325 MG tablet   This may have changed:    - medication strength  - how much to take  - when to take this  - reasons to take this   Used for:  Post-thoracotomy pain   Generic drug:  acetaminophen        Dose:  650 mg   Take 2 tablets (650 mg) by  mouth every 4 hours as needed   Quantity:  100 tablet   Refills:  0         CONTINUE these medicines which have NOT CHANGED        Dose / Directions    albuterol 108 (90 Base) MCG/ACT inhaler   Commonly known as:  PROAIR HFA/PROVENTIL HFA/VENTOLIN HFA   Used for:  Other emphysema (H)        Dose:  2 puff   Inhale 2 puffs into the lungs every 6 hours as needed for shortness of breath / dyspnea or wheezing   Quantity:  1 Inhaler   Refills:  5       allopurinol 100 MG tablet   Commonly known as:  ZYLOPRIM   Used for:  Podagra, Hyperuricemia        Dose:  100 mg   Take 1 tablet (100 mg) by mouth daily   Quantity:  90 tablet   Refills:  1       calcitRIOL 0.25 MCG capsule   Commonly known as:  ROCALTROL        Dose:  0.25 mcg   Take 1 capsule by mouth every other day.   Quantity:  90 capsule   Refills:  1       CLEAR EYES FOR DRY EYES 1-0.25 % Soln   Generic drug:  Carboxymethylcellul-Glycerin        Dose:  1 drop   Apply 1 drop to eye daily   Refills:  0       clopidogrel 75 MG tablet   Commonly known as:  PLAVIX   Used for:  Peripheral vascular disease (H)        Start taking on:  10/5/2018   TAKE 1 TABLET EVERY DAY   Quantity:  90 tablet   Refills:  1       EPINEPHrine 0.3 MG/0.3ML injection 2-pack   Commonly known as:  EPIPEN/ADRENACLICK/or ANY BX GENERIC EQUIV   Used for:  Bee sting reaction, initial encounter        Dose:  0.3 mg   Inject 0.3 mLs (0.3 mg) into the muscle once as needed for anaphylaxis   Quantity:  1 each   Refills:  11       * FUROSEMIDE PO        Dose:  40 mg   Take 40 mg by mouth every morning (2 x 20 mg = 40 mg dose)   Refills:  0       * LASIX PO        Dose:  20 mg   Take 20 mg by mouth At Bedtime   Refills:  0       * LEVOTHYROXINE SODIUM PO        Dose:  125 mcg   Take 125 mcg by mouth See Admin Instructions 6 times per week   Refills:  0       * LEVOTHYROXINE SODIUM PO        Dose:  62.5 mcg   Take 62.5 mcg by mouth once a week (Takes 0.5 x 125mcg tablet = 62.5mcg dose)   Refills:  0        metoprolol succinate 25 MG 24 hr tablet   Commonly known as:  TOPROL-XL   Used for:  Hypertension goal BP (blood pressure) < 140/90        TAKE 1 TABLET EVERY DAY   Quantity:  90 tablet   Refills:  3       multivitamin  with lutein Caps per capsule        Dose:  1 capsule   Take 1 capsule by mouth daily   Refills:  0       spironolactone 50 MG tablet   Commonly known as:  ALDACTONE        Dose:  50 mg   Take 1 tablet (50 mg) by mouth daily   Quantity:  30 tablet   Refills:  1       * TIZANIDINE HCL PO        Dose:  2 mg   Take 2 mg by mouth daily (before lunch) At noon   Refills:  0       * ZANAFLEX PO        Dose:  4 mg   Take 4 mg by mouth At Bedtime (2 x 2 mg = 4 mg dose)   Refills:  0       * Notice:  This list has 6 medication(s) that are the same as other medications prescribed for you. Read the directions carefully, and ask your doctor or other care provider to review them with you.         Where to get your medicines      These medications were sent to [facility] [place] - [address]  [address] [facility] [address], [place] [address]     Phone:  [phone]     clopidogrel 75 MG tablet    senna-docusate 8.6-50 MG per tablet         Some of these will need a paper prescription and others can be bought over the counter. Ask your nurse if you have questions.     Bring a paper prescription for each of these medications     HYDROmorphone 2 MG tablet                Protect others around you: Learn how to safely use, store and throw away your medicines at www.disposemymeds.org.        Information about OPIOIDS     PRESCRIPTION OPIOIDS: WHAT YOU NEED TO KNOW   We gave you an opioid (narcotic) pain medicine. It is important to manage your pain, but opioids are not always the best choice. You should first try all the other options your care team gave you. Take this medicine for as short a time (and as few doses) as possible.    Some activities can increase your pain, such as bandage changes or  therapy sessions. It may help to take your pain medicine 30 to 60 minutes before these activities. Reduce your stress by getting enough sleep, working on hobbies you enjoy and practicing relaxation or meditation. Talk to your care team about ways to manage your pain beyond prescription opioids.    These medicines have risks:    DO NOT drive when on new or higher doses of pain medicine. These medicines can affect your alertness and reaction times, and you could be arrested for driving under the influence (DUI). If you need to use opioids long-term, talk to your care team about driving.    DO NOT operate heavy machinery    DO NOT do any other dangerous activities while taking these medicines.    DO NOT drink any alcohol while taking these medicines.     If the opioid prescribed includes acetaminophen, DO NOT take with any other medicines that contain acetaminophen. Read all labels carefully. Look for the word  acetaminophen  or  Tylenol.  Ask your pharmacist if you have questions or are unsure.    You can get addicted to pain medicines, especially if you have a history of addiction (chemical, alcohol or substance dependence). Talk to your care team about ways to reduce this risk.    All opioids tend to cause constipation. Drink plenty of water and eat foods that have a lot of fiber, such as fruits, vegetables, prune juice, apple juice and high-fiber cereal. Take a laxative (Miralax, milk of magnesia, Colace, Senna) if you don t move your bowels at least every other day. Other side effects include upset stomach, sleepiness, dizziness, throwing up, tolerance (needing more of the medicine to have the same effect), physical dependence and slowed breathing.    Store your pills in a secure place, locked if possible. We will not replace any lost or stolen medicine. If you don t finish your medicine, please throw away (dispose) as directed by your pharmacist. The [place] Pollution Control Agency has more information about  safe disposal: https://www.pca.[facility].[place].us/living-green/managing-unwanted-medications             Medication List: This is a list of all your medications and when to take them. Check marks below indicate your daily home schedule. Keep this list as a reference.      Medications           Morning Afternoon Evening Bedtime As Needed    albuterol 108 (90 Base) MCG/ACT inhaler   Commonly known as:  PROAIR HFA/PROVENTIL HFA/VENTOLIN HFA   Inhale 2 puffs into the lungs every 6 hours as needed for shortness of breath / dyspnea or wheezing   Last time this was given:  2 puffs on 10/4/2018  7:59 AM                                   allopurinol 100 MG tablet   Commonly known as:  ZYLOPRIM   Take 1 tablet (100 mg) by mouth daily   Last time this was given:  100 mg on 10/4/2018  7:59 AM                                   calcitRIOL 0.25 MCG capsule   Commonly known as:  ROCALTROL   Take 1 capsule by mouth every other day.   Last time this was given:  0.25 mcg on 10/3/2018  8:44 AM                                   CLEAR EYES FOR DRY EYES 1-0.25 % Soln   Apply 1 drop to eye daily   Generic drug:  Carboxymethylcellul-Glycerin                                   clopidogrel 75 MG tablet   Commonly known as:  PLAVIX   TAKE 1 TABLET EVERY DAY   Start taking on:  10/5/2018                                   diclofenac 1 % Gel topical gel   Commonly known as:  VOLTAREN   Apply 2 grams to hands four times daily prn using enclosed dosing card.                                   EPINEPHrine 0.3 MG/0.3ML injection 2-pack   Commonly known as:  EPIPEN/ADRENACLICK/or ANY BX GENERIC EQUIV   Inject 0.3 mLs (0.3 mg) into the muscle once as needed for anaphylaxis                                   famciclovir 500 MG tablet   Commonly known as:  FAMVIR   TAKE 1 TABLET TWICE DAILY                                   * FUROSEMIDE PO   Take 40 mg by mouth every morning (2 x 20 mg = 40 mg dose)   Last time this was given:  20 mg on 10/3/2018 10:24 PM                                    * LASIX PO   Take 20 mg by mouth At Bedtime   Last time this was given:  20 mg on 10/3/2018 10:24 PM                                   HYDROmorphone 2 MG tablet   Commonly known as:  DILAUDID   Take 0.5-1 tablets (1-2 mg) by mouth every 6 hours as needed for moderate to severe pain (pain control or improvement in physical function. Hold dose for analgesic side effects.)   Last time this was given:  2 mg on 10/4/2018 10:43 AM                                   * LEVOTHYROXINE SODIUM PO   Take 125 mcg by mouth See Admin Instructions 6 times per week   Last time this was given:  125 mcg on 10/4/2018  8:03 AM                                   * LEVOTHYROXINE SODIUM PO   Take 62.5 mcg by mouth once a week (Takes 0.5 x 125mcg tablet = 62.5mcg dose)   Last time this was given:  125 mcg on 10/4/2018  8:03 AM                                   metoprolol succinate 25 MG 24 hr tablet   Commonly known as:  TOPROL-XL   TAKE 1 TABLET EVERY DAY   Last time this was given:  25 mg on 10/3/2018  8:45 AM                                   multivitamin  with lutein Caps per capsule   Take 1 capsule by mouth daily                                   predniSONE 20 MG tablet   Commonly known as:  DELTASONE   TAKE ONE TABLET BY MOUTH TWICE DAILY ONLY WHEN NEEDED FOR GOUT                                      senna-docusate 8.6-50 MG per tablet   Commonly known as:  SENOKOT-S;PERICOLACE   Take 1-2 tablets by mouth 2 times daily as needed for constipation   Last time this was given:  2 tablets on 10/4/2018  7:59 AM                                   simvastatin 40 MG tablet   Commonly known as:  ZOCOR   TAKE 1 TABLET EVERY DAY   Last time this was given:  40 mg on 10/3/2018 10:24 PM                                   spironolactone 50 MG tablet   Commonly known as:  ALDACTONE   Take 1 tablet (50 mg) by mouth daily   Last time this was given:  50 mg on 10/3/2018  8:45 AM                                   * TIZANIDINE HCL PO    Take 2 mg by mouth daily (before lunch) At noon   Last time this was given:  2 mg on 10/4/2018 10:46 AM                                   * ZANAFLEX PO   Take 4 mg by mouth At Bedtime (2 x 2 mg = 4 mg dose)                                   TYLENOL 325 MG tablet   Take 2 tablets (650 mg) by mouth every 4 hours as needed   Last time this was given:  975 mg on 10/4/2018  1:16 PM   Generic drug:  acetaminophen                                   * Notice:  This list has 6 medication(s) that are the same as other medications prescribed for you. Read the directions carefully, and ask your doctor or other care provider to review them with you.

## 2018-10-02 NOTE — IP AVS SNAPSHOT
[facility] [address] [facility]    [address] [place] [address]    Phone:  [phone]                                       After Visit Summary   10/2/2018    Marina Harrison    MRN: [id number]           After Visit Summary Signature Page     I have received my discharge instructions, and my questions have been answered. I have discussed any challenges I see with this plan with the nurse or doctor.    ..........................................................................................................................................  Patient/Patient Representative Signature      ..........................................................................................................................................  Patient Representative Print Name and Relationship to Patient    ..................................................               ................................................  Date                                   Time    ..........................................................................................................................................  Reviewed by Signature/Title    ...................................................              ..............................................  Date                                               Time          22EPIC Rev 08/18

## 2018-10-02 NOTE — BRIEF OP NOTE
[facility] Brief Operative Note    Pre-operative diagnosis: right lower lobe lung nodule   Post-operative diagnosis right lower lobe lung nodule     Procedure: Right video-assisted thoracoscopy, limited right thoracotomy, wedge resection right lower lobe lung nodule   Surgeon(s): Surgeon(s) and Role:     * Levy Jaquez MD - Primary     * Joanna Lala PA-C - Assisting   Estimated blood loss: 25 mL    Specimens:   ID Type Source Tests Collected by Time Destination   A : RIGHT LOWER LOBE LUNG NODULE Tissue Lung, Right Lower Lobe SURGICAL PATHOLOGY EXAM Levy Jaquez MD 10/2/2018  8:19 AM       Findings: Calcified nodule on chest wall, no pleural fluid, large puckered right lower lobe lung nodule sent for frozen section which revealed adenocarcinoma-- await final pathology

## 2018-10-02 NOTE — PROGRESS NOTES
Admission medication history interview status for the 10/2/2018  admission is complete. See EPIC admission navigator for prior to admission medications     Medication history source reliability:Good    Medication history interview source(s):Patient    Medication history resources (including written lists, pill bottles, clinic record):Patient mailed in her medication list prior to surgery    Primary pharmacy[facility]    Additional medication history information not noted on PTA med list :None    Time spent in this activity: 40 minutes    Prior to Admission medications    Medication Sig Last Dose Taking? Auth Provider   Acetaminophen (TYLENOL PO) Take 1,950 mg by mouth At Bedtime 10/1/2018 at HS Yes Reported, Patient   albuterol (PROAIR HFA/PROVENTIL HFA/VENTOLIN HFA) 108 (90 Base) MCG/ACT inhaler Inhale 2 puffs into the lungs every 6 hours as needed for shortness of breath / dyspnea or wheezing Past Month at PRN Yes Cody Obando MD   allopurinol (ZYLOPRIM) 100 MG tablet Take 1 tablet (100 mg) by mouth daily 10/1/2018 at 1200 Yes Cdoy Obando MD   calcitRIOL (ROCALTROL) 0.25 MCG capsule Take 1 capsule by mouth every other day. 10/1/2018 at AM Yes Cody Obando MD   Carboxymethylcellul-Glycerin (CLEAR EYES FOR DRY EYES) 1-0.25 % SOLN Apply 1 drop to eye daily 10/2/2018 at 0400 Yes Reported, Patient   clopidogrel (PLAVIX) 75 MG tablet TAKE 1 TABLET EVERY DAY 9/28/2018 at AM Yes Cody Obando MD   diclofenac (VOLTAREN) 1 % GEL Apply 2 grams to hands four times daily prn using enclosed dosing card.  Patient taking differently: Apply 2 g topically 4 times daily as needed Apply 2 grams to hands four times daily prn using enclosed dosing card. More than a Month at PRN Yes Cody Obando MD   EPINEPHrine (EPIPEN) 0.3 MG/0.3ML injection Inject 0.3 mLs (0.3 mg) into the muscle once as needed for anaphylaxis Not yet needed at PRN Yes Delia Nixon MD   famciclovir (FAMVIR) 500 MG tablet TAKE 1 TABLET TWICE  DAILY  Patient taking differently: TAKE 1 TABLET TWICE DAILY AS NEEDED FOR SHINGLES 9/24/2018 Yes Cody Obando MD   Furosemide (LASIX PO) Take 20 mg by mouth At Bedtime 10/1/2018 at HS Yes Reported, Patient   FUROSEMIDE PO Take 40 mg by mouth every morning (2 x 20 mg = 40 mg dose) 10/1/2018 at AM Yes Reported, Patient   LEVOTHYROXINE SODIUM PO Take 125 mcg by mouth See Admin Instructions 6 times per week 10/2/2018 at 0400 Yes Reported, Patient   LEVOTHYROXINE SODIUM PO Take 62.5 mcg by mouth once a week (Takes 0.5 x 125mcg tablet = 62.5mcg dose) 9/26/2018 at AM Yes Reported, Patient   metoprolol succinate (TOPROL-XL) 25 MG 24 hr tablet TAKE 1 TABLET EVERY DAY 10/2/2018 at 0400 Yes Cody Obando MD   multivitamin  with lutein (OCUVITE WITH LTEIN) CAPS Take 1 capsule by mouth daily 10/1/2018 at AM Yes Reported, Patient   predniSONE (DELTASONE) 20 MG tablet TAKE ONE TABLET BY MOUTH TWICE DAILY ONLY WHEN NEEDED FOR GOUT  Patient taking differently: Take by mouth 2 times daily as needed (Gout) TAKE ONE TABLET BY MOUTH TWICE DAILY ONLY WHEN NEEDED FOR GOUT Past Week at PRN Yes Cody Obando MD   simvastatin (ZOCOR) 40 MG tablet TAKE 1 TABLET EVERY DAY  Patient taking differently: Take 40 mg by mouth At Bedtime TAKE 1 TABLET EVERY DAY 10/1/2018 at PM Yes Cody Obando MD   spironolactone (ALDACTONE) 50 MG tablet Take 1 tablet (50 mg) by mouth daily 10/1/2018 at 1200 Yes Cody Obando MD   TiZANidine HCl (ZANAFLEX PO) Take 4 mg by mouth At Bedtime (2 x 2 mg = 4 mg dose) 10/1/2018 at HS Yes Reported, Patient   TIZANIDINE HCL PO Take 2 mg by mouth daily (before lunch) At noon 10/1/2018 at 1200 Yes Reported, Patient

## 2018-10-02 NOTE — ANESTHESIA POSTPROCEDURE EVALUATION
Patient: Marina Harrison    Procedure(s):  RIGHT VIDEO ASSISTED THORACOSCOPY WEDGE RESECTION RIGHT, LOWER LOBE LUNG NODULE, LIMITED THORACOTOMY - Wound Class: I-Clean    Diagnosis:right lower lobe lung nodule  Diagnosis Additional Information: No value filed.    Anesthesia Type:  General, ETT    Note:  Anesthesia Post Evaluation    Patient location during evaluation: PACU  Patient participation: Able to fully participate in evaluation  Level of consciousness: awake and alert  Pain management: adequate  Airway patency: patent  Cardiovascular status: acceptable  Respiratory status: acceptable  Hydration status: acceptable  PONV: none     Anesthetic complications: None          Last vitals:  Vitals:    10/02/18 0950 10/02/18 1000 10/02/18 1010   BP: 164/77 157/73 134/75   Pulse:      Resp: 12 14 14   Temp:      SpO2: 97% 99% 100%         Electronically Signed By: Calli Hong MD  October 2, 2018  10:37 AM

## 2018-10-02 NOTE — OP NOTE
Procedure Date: 10/02/2018      SURGEON:  Levy Jaquez MD      FIRST ASSISTANT:  Joanna Lala PA-C      PREOPERATIVE DIAGNOSIS:  Right lower lobe lung nodule.      POSTOPERATIVE DIAGNOSIS:  Adenocarcinoma, right lower lobe lung.      PROCEDURES:   1.  Right video-assisted thoracoscopy.   2.  Limited right thoracotomy.   3.  Wedge resection, right lower lobe lung nodule.      ANESTHESIA:  General with double endotracheal tube.      INDICATIONS:  An 78-year-old woman who was found to have a bronchus opacity suspicious for primary lepidic adenocarcinoma of the left upper lobe lung.  There was a second nodule which is subpleural, located at the base of the right lower lobe lung.  Both areas are hypermetabolic and suspicious for 2 separate primaries.  Options of diagnostic procedure and treatment were reviewed.  A video thoracoscopy, possible limited thoracotomy, and wedge resection is indicated for diagnosis and treatment on the right side  at this time.      DESCRIPTION OF PROCEDURE:  The patient was brought in and placed in supine position.  Under general anesthesia with double endotracheal tube, the patient was placed in a left lateral decubitus position.  Her right chest was prepared and draped in the usual fashion using ChloraPrep.  Ventilation of the right lung was continued.  Three thoracoscopic incisions were made in the usual fashion.  The video thoracoscope was introduced.  On examination, there is an exophytic calcification of the parietal pleura posteriorly mid chest.  The nodule was easily identified.  The inferior pulmonary ligament was mobilized.  Because of the location, I elected to proceed with a very small limited thoracotomy enlarging the posterior thoracoscopic incision.  Then, using multiple applications of [place] 60 gold stapling device, a wedge resection was done with excellent margin.  The staple line was hemostatic.  On-Q catheter was placed.  Thirty mL Marcaine 0.5% without  epinephrine was injected as an intercostal block.  Through a separate stab wound, a 24 [place] chest tube was placed with the tip directed toward the apex posteriorly and sutured to the skin with 2 silk suture.  The incision was closed with pericostal suture of Vicryl #1, running #1 Vicryl in muscular layer, running 2-0 Vicryl in the subcutaneous tissues, closed with Insorb staples.  Estimated blood loss 25 mL.  Needle and sponge count is correct.      Joanna Lala PA-C, was the first assistant during the procedure.  Her role as first assistant was essential and necessary in accomplishing the steps of the procedure as described above, providing exposure, retraction and handling of the scope.         LAYNE HURD MD             D: 10/02/2018   T: 10/02/2018   MT: H[place]ARY      Name:     ANDRE MAGALLON   MRN:      [id number]        Account:        [id number]   :      1940           Procedure Date: 10/02/2018      Document: [id number]

## 2018-10-02 NOTE — ANESTHESIA CARE TRANSFER NOTE
Patient: Marina Harrison    Procedure(s):  RIGHT VIDEO ASSISTED THORACOSCOPY WEDGE RESECTION RIGHT, LOWER LOBE LUNG NODULE, LIMITED THORACOTOMY - Wound Class: I-Clean    Diagnosis: right lower lobe lung nodule  Diagnosis Additional Information: No value filed.    Anesthesia Type:   General, ETT     Note:  Airway :Face Mask  Patient transferred to:PACU  Handoff Report: Identifed the Patient, Identified the Reponsible Provider, Reviewed the pertinent medical history, Discussed the surgical course, Reviewed Intra-OP anesthesia mangement and issues during anesthesia, Set expectations for post-procedure period and Allowed opportunity for questions and acknowledgement of understanding      Vitals: (Last set prior to Anesthesia Care Transfer)    CRNA VITALS  10/2/2018 0838 - 10/2/2018 0917      10/2/2018             Pulse: 68    SpO2: 97 %    Resp Rate (set): 10                Electronically Signed By: NILS Naidu CRNA  October 2, 2018  9:17 AM

## 2018-10-02 NOTE — CONSULTS
Consult Date:  10/02/2018      REQUESTING PROVIDER:  Joanna IBARRA      REASON FOR CONSULTATION:  Assist with medical comanagement in the postoperative period.      HISTORY OF PRESENT ILLNESS:  Ms. Harrison is an 78-year-old woman with extensive past medical history including left lung cancer, further details unknown, but pending surgical intervention in the coming weeks; chronic HFpEF, CKD 3; emphysematous COPD, status post PFTs on 09/20/2018, but results unavailable; hypothyroidism; peripheral vascular disease, status post aortobifemoral bypass; hypertension; hyperlipidemia; gout, periodically requiring steroids and recently started on allopurinol in 08/2018, who was having right lower extremity and right lower quadrant abdominal pain.  In evaluating that complaint, after conferring with her primary provider where she was to have a CT scan of the right leg and abdomen, given her history of lung cancer, and with some ongoing cough and significant smoking history, active lung cancer and a CT of the chest was also pursued.  A new right lung nodule in the right lower lobe was found.  She has undergone on 10/02/2018 with Dr. Levy Jaquez, a right lower lobe wedge resection with frozen pathology showing adenocarcinoma, not felt to warrant any chemotherapy or radiation by her report.  The surgical approach was via a right VATS and a limited right thoracotomy.  Duration of surgery was 2 hours.  She received general endotracheal anesthesia and had routine airway.  She received perioperative Decadron, phenylephrine and Ancef as well as 1500 mL of LR.  EBL was 25 mL.  Systolic blood pressure in the perioperative period ranged from  mmHg.  The patient took her metoprolol today but not her diuretics.  Postoperative chest x-ray showed a right-sided chest tube with subcutaneous air, but no pneumothorax.  She was extubated at the end of her procedure and admitted to station 33 as [facility] status.  The Hospitalist Service has  been asked to see her in consultation to assist in management of her medical comorbid conditions in the postoperative period.      PAST MEDICAL HISTORY:     1.  Left-sided lung cancer, further details unknown.   2.  Newly diagnosed right lung lesion, consistent with adenocarcinoma by frozen pathology.   3.  CAD.   4.  HFpEF.   5.  CKD 3.   6.  Emphysematous COPD.  PFTs completed, results unknown.   7.  Hypothyroidism.   8.  Hypertension.   9.  Hyperlipidemia.   10.  Gout.   11.  PVD, status post revascularization with stents and aortobifemoral bypass.      PAST SURGICAL HISTORY:   1.  Status post aortobifemoral bypass.   2.  Appendectomy.   3.  Cholecystectomy.   4.  Bunionectomy.   5.  Bilateral total hip arthroplasty.   6.  Bilateral mastectomy, which was prophylactic.   7.  Laminectomy.   8.  PAULA/BSO.   9.  Chest wall tumor resection.   10.  Goiter removal.   11.  Hemorrhoidectomy.      SOCIAL HISTORY:  The patient drinks alcohol once a week.  Former longtime smoker at a max of 2-3 packs a day for at least 30 years.  She receives primary care from Dr. Cody Obando.  She also has a nephrologist.      FAMILY MEDICAL HISTORY:  Sister with breast cancer.  Mother  at age 61 of esophageal cancer.  Sister with lung cancer.  Father with colon cancer.      OUTPATIENT MEDICATIONS:    Prior to Admission Medications   Prescriptions Last Dose Informant Patient Reported? Taking?   Acetaminophen (TYLENOL PO) 10/1/2018 at HS Self Yes Yes   Sig: Take 1,950 mg by mouth At Bedtime   Carboxymethylcellul-Glycerin (CLEAR EYES FOR DRY EYES) 1-0.25 % SOLN 10/2/2018 at 0400 Self Yes Yes   Sig: Apply 1 drop to eye daily   EPINEPHrine (EPIPEN) 0.3 MG/0.3ML injection Not yet needed at PRN Self No Yes   Sig: Inject 0.3 mLs (0.3 mg) into the muscle once as needed for anaphylaxis   FUROSEMIDE PO 10/1/2018 at AM Self Yes Yes   Sig: Take 40 mg by mouth every morning (2 x 20 mg = 40 mg dose)   Furosemide (LASIX PO) 10/1/2018 at HS Self  Yes Yes   Sig: Take 20 mg by mouth At Bedtime   LEVOTHYROXINE SODIUM PO 10/2/2018 at 0400 Self Yes Yes   Sig: Take 125 mcg by mouth See Admin Instructions 6 times per week   LEVOTHYROXINE SODIUM PO 9/26/2018 at AM Self Yes Yes   Sig: Take 62.5 mcg by mouth once a week (Takes 0.5 x 125mcg tablet = 62.5mcg dose)   TIZANIDINE HCL PO 10/1/2018 at 1200 Self Yes Yes   Sig: Take 2 mg by mouth daily (before lunch) At noon   TiZANidine HCl (ZANAFLEX PO) 10/1/2018 at HS Self Yes Yes   Sig: Take 4 mg by mouth At Bedtime (2 x 2 mg = 4 mg dose)   albuterol (PROAIR HFA/PROVENTIL HFA/VENTOLIN HFA) 108 (90 Base) MCG/ACT inhaler Past Month at PRN Self No Yes   Sig: Inhale 2 puffs into the lungs every 6 hours as needed for shortness of breath / dyspnea or wheezing   allopurinol (ZYLOPRIM) 100 MG tablet 10/1/2018 at 1200 Self No Yes   Sig: Take 1 tablet (100 mg) by mouth daily   calcitRIOL (ROCALTROL) 0.25 MCG capsule 10/1/2018 at AM Self Yes Yes   Sig: Take 1 capsule by mouth every other day.   clopidogrel (PLAVIX) 75 MG tablet 9/28/2018 at AM Self No Yes   Sig: TAKE 1 TABLET EVERY DAY   diclofenac (VOLTAREN) 1 % GEL More than a Month at PRN Self No Yes   Sig: Apply 2 grams to hands four times daily prn using enclosed dosing card.   Patient taking differently: Apply 2 g topically 4 times daily as needed Apply 2 grams to hands four times daily prn using enclosed dosing card.   famciclovir (FAMVIR) 500 MG tablet 9/24/2018 Self No Yes   Sig: TAKE 1 TABLET TWICE DAILY   Patient taking differently: TAKE 1 TABLET TWICE DAILY AS NEEDED FOR SHINGLES   metoprolol succinate (TOPROL-XL) 25 MG 24 hr tablet 10/2/2018 at 0400 Self No Yes   Sig: TAKE 1 TABLET EVERY DAY   multivitamin  with lutein (OCUVITE WITH LTEIN) CAPS 10/1/2018 at AM Self Yes Yes   Sig: Take 1 capsule by mouth daily   predniSONE (DELTASONE) 20 MG tablet Past Week at PRN Self No Yes   Sig: TAKE ONE TABLET BY MOUTH TWICE DAILY ONLY WHEN NEEDED FOR GOUT   Patient taking  differently: Take by mouth 2 times daily as needed (Gout) TAKE ONE TABLET BY MOUTH TWICE DAILY ONLY WHEN NEEDED FOR GOUT   simvastatin (ZOCOR) 40 MG tablet 10/1/2018 at PM Self No Yes   Sig: TAKE 1 TABLET EVERY DAY   Patient taking differently: Take 40 mg by mouth At Bedtime TAKE 1 TABLET EVERY DAY   spironolactone (ALDACTONE) 50 MG tablet 10/1/2018 at 1200 Self Yes Yes   Sig: Take 1 tablet (50 mg) by mouth daily      Facility-Administered Medications: None       REVIEW OF SYSTEMS:   CONSTITUTIONAL:  Negative for fevers, chills.  Has been having night sweats but no weight loss.  As per HPI, also had a recent bout of shingles.  She has been holding her Plavix since late last week.   CARDIOVASCULAR:  Denies any palpitations.  Has some chronic lower extremity edema for which she uses a compression sock on the left.  She gets some chest discomfort and soreness, which she attributes to a hiatal hernia that was recently seen on a CT of the chest.  This was discussed with Dr. Levy Jaquez and she is pursuing conservative management at present.  She gets relief with over-the-counter H2 blockers and it can occur independent of eating and does not seem to be exertionally precipitated.  There is no radiation.  I asked her 2 different ways if she has had a cardiac evaluation and I can only see that she has had an electrocardiogram, no further diagnostics.   GI:  The patient reports anorexia for the last 1 week.  Otherwise, appetite has been fine.  She alternates diarrhea and constipation.  No nausea or vomiting.   :  Reports urinary incontinence.  No other complaints.   MUSCULOSKELETAL:  Chronic arthritic pains.     INTEGUMENT:  No acute skin changes.   ENDOCRINE:  Reports thinning hair and reports takes 1 dose of levothyroxine 125 mcg 6 times per week and 62.5 mcg once a week with the last TSH being within normal limits in 08/2018.    NEUROLOGIC:  Recent right lower extremity claudication leg pain but no other complaints of  headache, vision changes or syncopal episodes.      PHYSICAL EXAMINATION:   GENERAL:  Elderly woman, appears younger than stated age, lying in bed without acute distress.   HEENT:  Head is normocephalic, atraumatic.  Pupils are 3 mm, briskly reactive bilaterally.  Sclerae nonicteric, noninjected.  Mouth has dry oral mucosa.   NECK:  Supple.  There is no supraclavicular lymphadenopathy.   CARDIOVASCULAR:  S1, S2.  No murmurs.  Hypertensive 158/78, and again in the 170s on recheck.   LUNGS:  Clear to auscultation.  No wheezing.  Right mid axillary line has a chest tube with minimal output to 20 cm of suction and no air leak.  There is no subcutaneous emphysema appreciable.     BACK:  There is also a pain pump emanating from the lateral aspect of her right posterior back.  The right upper back also has a small thoracotomy incision.     ABDOMEN:  Hypoactive bowel sounds, soft, nontender, nondistended.  She was nauseated, vomited at the end of the interview.  Abdomen is soft.   EXTREMITIES:  Asymmetric left more than right lower extremity edema which she says is chronic.  It is nonpitting.     CNS:  Face symmetric.  Tongue is midline.  Speech is fluent.  Follows commands, does show 2 fingers and wiggle feet bilaterally.      ASSESSMENT:  Ms. Harrison is an 78-year-old woman with past medical history left lung cancer pending surgical intervention in the near future; chronic HFpEF, CKD 3; emphysematous COPD, status post PFTs on 09/20/2018, but severity unknown; hypothyroidism; peripheral vascular disease, status post aortobifemoral bypass; hypertension; hyperlipidemia; gout who is status post right video-assisted thoracoscopy, a limited right thoracotomy and right lower lobe lung nodule wedge resection with postop diagnosis of right lower lobe lung adenocarcinoma from 10/02/2018.  The Hospitalist Service has been asked to see her in consultation to assist with medical co-management.      PROBLEM LIST AND PLAN:     Status  post right lower lobe lung nodule wedge resection via right VATS and limited right thoracotomy with resultant frozen specimen, diagnosis of adenocarcinoma on 10/02/2018.   -- Defer to the primary surgical service management of the chest tube, analgesic regimen, antimicrobials and pulmonary hygiene maneuvers.     Emphysematous chronic obstructive pulmonary disease, status post pulmonary function test on 09/20/2018, results of which are unavailable at present.  She reports infrequent inhaler use.   -- Continue PTA p.r.n. albuterol and ensure that she has a spacer.   -- Titrate FIO2 to keep SpO2 greater than or equal to 90-92%.     Hypertension.   Hyperlipidemia.   Peripheral vascular disease, status post aortobifemoral bypass.   Coronary artery disease.   Heart failure with preserved ejection fraction, chronic.   -- We have retimed her Lasix and Aldactone to be started on the a.m. of 10/03/2018 when she is likely to be off of IV fluids.   -- Continue PTA metoprolol.   -- Will add p.r.n. IV hydralazine for SBP sustained greater than 160.   -- When it is safe from a postsurgical standpoint, we recommend resuming her Plavix ASAP.     Chronic kidney disease 3 with baseline creatinine of 1.8-2.  She is in that range at present.   -- Continue strict I's and O's.   -- Renally dose all meds to avoid hypotension and nephrotoxins.   --continue calcitriol    Hypothyroidism with normal TSH in 08/2018.   -- Continue PTA dose of levothyroxine.     sensitivity to narcotics as per her preoperative evaluation.    high risk for delirium given anesthetic exposure, age, new environment, etc.   -- Will discontinue diphenhydramine given its deliriogenic properties.     Alternating diarrhea and constipation.   -- Agree with scheduled bowel regimen.     Prophylaxis:  Enoxaparin is scheduled to start on 10/03/2018 in addition to PCDs and H2 blockers.     Chest discomfort felt to be from hiatal hernia, large size, as per patient's subjective  report.   -- Continue PTA H2 blockers.     Gout.   -- Continue PTA allopurinol.     Lines:  18-gauge catheter in the left upper extremity.     No code status was ordered.  I have discussed this with the patient and we reviewed her advanced care directive together.  For potentially reversible condition, she does desire full resuscitative measures, noting that there is a clause that she would not like life sustaining measures for a prolonged period of time for irreversible conditions.  We have changed her code status accordingly.      TOTAL TIME: 52 minutes from 9726-8760, 7240-2775 and 109-111 of which 28 minutes was face to face time remainder spent in counseling and coordination of care      The patient will be independently evaluated by Dr. Dimitris Reed.      As dictated by NILS COSTELLO, CNP      DIMITRIS REED MD                  D: 10/02/2018   T: 10/02/2018   MT: [place]      Name:     ANDRE MAGALLON   MRN:      [id number]        Account:       [id number]   :      1940           Consult Date:  10/02/2018      Document: [id number]       cc: Cody IBARRA

## 2018-10-02 NOTE — PLAN OF CARE
Problem: Patient Care Overview  Goal: Plan of Care/Patient Progress Review  Outcome: Improving  Patient arrived from PACU at 12. Alert and oriented. Vital signs stable. Weaned to room air. Tele: SB. HR 50's. Nausea upon arrival, PRN zofran given, resolved now. PRN dilaudid and scheduled tylenol for pain. R chest tube to suction, no AL, no crepitus. R thoracotomy dressing with shadow drainage, marked no change. OnQ pump infusing, sensors taped, clamps open. IS to 750. Green acapella. Ambulating and up to chair astx1. Voiding. Tolerating PO better this evening.

## 2018-10-03 ENCOUNTER — APPOINTMENT (OUTPATIENT)
Dept: GENERAL RADIOLOGY | Facility: CLINIC | Age: 78
DRG: 164 | End: 2018-10-03
Attending: PHYSICIAN ASSISTANT
Payer: MEDICARE

## 2018-10-03 LAB
ANION GAP SERPL CALCULATED.3IONS-SCNC: 10 MMOL/L (ref 3–14)
BUN SERPL-MCNC: 37 MG/DL (ref 7–30)
CALCIUM SERPL-MCNC: 8.2 MG/DL (ref 8.5–10.1)
CHLORIDE SERPL-SCNC: 103 MMOL/L (ref 94–109)
CO2 SERPL-SCNC: 26 MMOL/L (ref 20–32)
COPATH REPORT: NORMAL
CREAT SERPL-MCNC: 1.7 MG/DL (ref 0.52–1.04)
ERYTHROCYTE [DISTWIDTH] IN BLOOD BY AUTOMATED COUNT: 13.7 % (ref 10–15)
GFR SERPL CREATININE-BSD FRML MDRD: 29 ML/MIN/1.7M2
GLUCOSE SERPL-MCNC: 141 MG/DL (ref 70–99)
HCT VFR BLD AUTO: 33.5 % (ref 35–47)
HGB BLD-MCNC: 11 G/DL (ref 11.7–15.7)
MCH RBC QN AUTO: 31.2 PG (ref 26.5–33)
MCHC RBC AUTO-ENTMCNC: 32.8 G/DL (ref 31.5–36.5)
MCV RBC AUTO: 95 FL (ref 78–100)
PLATELET # BLD AUTO: 167 10E9/L (ref 150–450)
POTASSIUM SERPL-SCNC: 3.7 MMOL/L (ref 3.4–5.3)
RBC # BLD AUTO: 3.53 10E12/L (ref 3.8–5.2)
SODIUM SERPL-SCNC: 139 MMOL/L (ref 133–144)
WBC # BLD AUTO: 10.7 10E9/L (ref 4–11)

## 2018-10-03 PROCEDURE — 71045 X-RAY EXAM CHEST 1 VIEW: CPT

## 2018-10-03 PROCEDURE — 85027 COMPLETE CBC AUTOMATED: CPT | Performed by: PHYSICIAN ASSISTANT

## 2018-10-03 PROCEDURE — 12000007 ZZH R&B INTERMEDIATE

## 2018-10-03 PROCEDURE — 25000128 H RX IP 250 OP 636: Performed by: NURSE PRACTITIONER

## 2018-10-03 PROCEDURE — 25000131 ZZH RX MED GY IP 250 OP 636 PS 637: Mod: GY | Performed by: PHYSICIAN ASSISTANT

## 2018-10-03 PROCEDURE — A9270 NON-COVERED ITEM OR SERVICE: HCPCS | Mod: GY | Performed by: NURSE PRACTITIONER

## 2018-10-03 PROCEDURE — 80048 BASIC METABOLIC PNL TOTAL CA: CPT | Performed by: PHYSICIAN ASSISTANT

## 2018-10-03 PROCEDURE — 36415 COLL VENOUS BLD VENIPUNCTURE: CPT | Performed by: PHYSICIAN ASSISTANT

## 2018-10-03 PROCEDURE — 25000132 ZZH RX MED GY IP 250 OP 250 PS 637: Mod: GY | Performed by: NURSE PRACTITIONER

## 2018-10-03 PROCEDURE — A9270 NON-COVERED ITEM OR SERVICE: HCPCS | Mod: GY | Performed by: HOSPITALIST

## 2018-10-03 PROCEDURE — 25000132 ZZH RX MED GY IP 250 OP 250 PS 637: Mod: GY | Performed by: HOSPITALIST

## 2018-10-03 PROCEDURE — 40000884 ZZH STATISTIC STEP DOWN HRS NIGHT

## 2018-10-03 PROCEDURE — A9270 NON-COVERED ITEM OR SERVICE: HCPCS | Mod: GY | Performed by: PHYSICIAN ASSISTANT

## 2018-10-03 PROCEDURE — 99232 SBSQ HOSP IP/OBS MODERATE 35: CPT | Performed by: HOSPITALIST

## 2018-10-03 PROCEDURE — 25000125 ZZHC RX 250: Performed by: PHYSICIAN ASSISTANT

## 2018-10-03 PROCEDURE — 25000132 ZZH RX MED GY IP 250 OP 250 PS 637: Mod: GY | Performed by: PHYSICIAN ASSISTANT

## 2018-10-03 PROCEDURE — 25000132 ZZH RX MED GY IP 250 OP 250 PS 637: Mod: GY | Performed by: THORACIC SURGERY (CARDIOTHORACIC VASCULAR SURGERY)

## 2018-10-03 PROCEDURE — 25000128 H RX IP 250 OP 636: Performed by: PHYSICIAN ASSISTANT

## 2018-10-03 RX ORDER — TIZANIDINE 2 MG/1
4 TABLET ORAL AT BEDTIME
Status: DISCONTINUED | OUTPATIENT
Start: 2018-10-03 | End: 2018-10-04 | Stop reason: HOSPADM

## 2018-10-03 RX ORDER — FAMOTIDINE 20 MG/1
20 TABLET, FILM COATED ORAL AT BEDTIME
Status: DISCONTINUED | OUTPATIENT
Start: 2018-10-03 | End: 2018-10-04 | Stop reason: HOSPADM

## 2018-10-03 RX ORDER — CALCIUM CARBONATE 500 MG/1
1000 TABLET, CHEWABLE ORAL
Status: DISCONTINUED | OUTPATIENT
Start: 2018-10-03 | End: 2018-10-04 | Stop reason: HOSPADM

## 2018-10-03 RX ADMIN — FUROSEMIDE 40 MG: 40 TABLET ORAL at 08:45

## 2018-10-03 RX ADMIN — BACITRACIN: 500 OINTMENT TOPICAL at 18:19

## 2018-10-03 RX ADMIN — ENOXAPARIN SODIUM 30 MG: 30 INJECTION SUBCUTANEOUS at 05:08

## 2018-10-03 RX ADMIN — HYDRALAZINE HYDROCHLORIDE 10 MG: 20 INJECTION INTRAMUSCULAR; INTRAVENOUS at 03:15

## 2018-10-03 RX ADMIN — Medication 2 MG: at 01:53

## 2018-10-03 RX ADMIN — ONDANSETRON 4 MG: 2 INJECTION INTRAMUSCULAR; INTRAVENOUS at 05:11

## 2018-10-03 RX ADMIN — Medication 2 MG: at 05:08

## 2018-10-03 RX ADMIN — CALCIUM CARBONATE (ANTACID) CHEW TAB 500 MG 1000 MG: 500 CHEW TAB at 18:16

## 2018-10-03 RX ADMIN — SPIRONOLACTONE 50 MG: 50 TABLET, FILM COATED ORAL at 08:45

## 2018-10-03 RX ADMIN — CALCITRIOL 0.25 MCG: 0.25 CAPSULE, LIQUID FILLED ORAL at 08:44

## 2018-10-03 RX ADMIN — Medication 1 LOZENGE: at 00:12

## 2018-10-03 RX ADMIN — ACETAMINOPHEN 975 MG: 325 TABLET, FILM COATED ORAL at 05:08

## 2018-10-03 RX ADMIN — SODIUM CHLORIDE: 9 INJECTION, SOLUTION INTRAVENOUS at 01:51

## 2018-10-03 RX ADMIN — SENNOSIDES AND DOCUSATE SODIUM 2 TABLET: 8.6; 5 TABLET ORAL at 08:46

## 2018-10-03 RX ADMIN — ACETAMINOPHEN 975 MG: 325 TABLET, FILM COATED ORAL at 12:35

## 2018-10-03 RX ADMIN — CALCIUM CARBONATE (ANTACID) CHEW TAB 500 MG 1000 MG: 500 CHEW TAB at 11:00

## 2018-10-03 RX ADMIN — Medication 62.5 MCG: at 07:14

## 2018-10-03 RX ADMIN — TIZANIDINE 2 MG: 2 TABLET ORAL at 12:35

## 2018-10-03 RX ADMIN — FUROSEMIDE 20 MG: 20 TABLET ORAL at 22:24

## 2018-10-03 RX ADMIN — ALLOPURINOL 100 MG: 100 TABLET ORAL at 08:45

## 2018-10-03 RX ADMIN — SENNOSIDES AND DOCUSATE SODIUM 2 TABLET: 8.6; 5 TABLET ORAL at 22:30

## 2018-10-03 RX ADMIN — TIZANIDINE 4 MG: 2 TABLET ORAL at 22:23

## 2018-10-03 RX ADMIN — BACITRACIN: 500 OINTMENT TOPICAL at 03:06

## 2018-10-03 RX ADMIN — ACETAMINOPHEN 975 MG: 325 TABLET, FILM COATED ORAL at 22:23

## 2018-10-03 RX ADMIN — FAMOTIDINE 20 MG: 20 TABLET, FILM COATED ORAL at 22:23

## 2018-10-03 RX ADMIN — SIMVASTATIN 40 MG: 40 TABLET, FILM COATED ORAL at 22:24

## 2018-10-03 RX ADMIN — METOPROLOL SUCCINATE 25 MG: 25 TABLET, EXTENDED RELEASE ORAL at 08:45

## 2018-10-03 RX ADMIN — ONDANSETRON 4 MG: 4 TABLET, ORALLY DISINTEGRATING ORAL at 11:50

## 2018-10-03 RX ADMIN — Medication 2 MG: at 10:04

## 2018-10-03 RX ADMIN — BACITRACIN: 500 OINTMENT TOPICAL at 08:45

## 2018-10-03 RX ADMIN — Medication 1 LOZENGE: at 05:08

## 2018-10-03 ASSESSMENT — ACTIVITIES OF DAILY LIVING (ADL)
ADLS_ACUITY_SCORE: 15

## 2018-10-03 NOTE — PLAN OF CARE
Problem: Patient Care Overview  Goal: Plan of Care/Patient Progress Review  Outcome: Improving  A/Ox4. VSS. Pt ambulates assist 1. Right thoracic Incision ecchymotic open to air dry with steri strips.Chest tubeX1 -20 suction, no air leak, +crepitus. Chest tube incision drainage amount copious. Dressing changed this shift. Pt states having nausea, heartburn and pain. PRN zofran, TUMS, and PRN Oxy given. Chest tube drain 50 ml out.

## 2018-10-03 NOTE — PROGRESS NOTES
THORACIC SURGERY POD # 1    Doing well  AVSS on RA  No air leak, no bleeding    CXR OK    Clamp CT tonight  Discussed pathology report    LAYNE HURD MD [facility] ONCOLOGY THORACIC SURGERY  CELL:  [phone]  OFFICE: [phone]

## 2018-10-03 NOTE — PROGRESS NOTES
[facility]    Hospitalist Progress Note      Assessment & Plan   Marina Harrison is a 78 year old female who was admitted on 10/2/2018.  Past medical history left lung cancer pending surgical intervention in the near future, HFpEF, CKD, COPD, PVD,  HTN who is status post right lower lobe lung nodule wedge resection.  The Hospitalist Service has been asked to see her in consultation to assist with medical co-management.           RLL nodule s/p wedge resection 10/2  Uncomplicated procedure performed by Dr. Sherwood.  Frozen section consistent with adenocarcinoma.  - post-op management per Thoracic surgery     COPD  Reportedly underwent PFT's 9/20, results unavailable.  - Continue PTA p.r.n. albuterol      HTN  PVD s/p aortobifemoral bypass.   CAD  HFpEF  Nuc stress 10/2009 with area of reversible ischemic, but no significant CAD noted on angiogram 11/2009.  EF 65% on stress test.    - resume PTA Lasix and Aldactone  - continue PTA metoprolol  - resume Plavix once safe from surgical perspective     CKD stage III  Baseline creatinine of 1.8-2, currently stable.  -continue calcitriol     Hypothyroidism   - Continue PTA levothyroxine.      Acid reflux  Reportedly due to large hiatal hernia.   - Continue PTA H2 blockers.      Gout.   - Continue PTA allopurinol.     DVT Prophylaxis: Defer to primary service  Code Status: Full Code    Disposition: Expected discharge per Thoracic surgery.    Dimitris Reed    Interval History   Reports pain is adequately controlled.  No dyspnea, fever/chills, chest pressure or other complaints.    -Data reviewed today: I reviewed all new labs and imaging results over the last 24 hours. I personally reviewed no images or EKG's today.    Physical Exam   Temp: 97.8  F (36.6  C) Temp src: Oral BP: 129/61 Pulse: 62 Heart Rate: 63 Resp: 14 SpO2: 97 % O2 Device: None (Room air) Oxygen Delivery: 2 LPM  There were no vitals filed for this visit.  Vital Signs with Ranges  Temp:   [94.5  F (34.7  C)-97.8  F (36.6  C)] 97.8  F (36.6  C)  Pulse:  [62] 62  Heart Rate:  [52-63] 63  Resp:  [8-24] 14  BP: (114-186)/() 129/61  SpO2:  [90 %-100 %] 97 %  I/O last 3 completed shifts:  In: 3860.6 [P.O.:750; I.V.:3110.6]  Out: 1475 [Urine:1100; Blood:25; Chest Tube:350]    Constitutional: Well developed, well nourished female in no acute distress  Respiratory: Mildly diminished bibasilar lung sounds, no wheezing or crackles, no tachypnea  Cardiovascular: regular rate and rhythm, normal S1/S2 without murmur, rubs or gallops  GI: abdomen soft, normal bowel sounds  Skin/Integumen:   Other:  alert and appropriate, cranial nerves grossly intact    Medications     disposable pump w/ anesthetic       - MEDICATION INSTRUCTIONS -       sodium chloride Stopped (10/03/18 0610)       acetaminophen  975 mg Oral Q8H     aerochamber  1 each Inhalation Once     allopurinol  100 mg Oral Daily     bacitracin   Topical TID     calcitRIOL  0.25 mcg Oral Every Other Day     Carboxymethylcellulose Sod PF  1 drop Both Eyes Daily     enoxaparin  30 mg Subcutaneous Q24H     famotidine  20 mg Oral At Bedtime     furosemide (LASIX) tablet 20 mg  20 mg Oral At Bedtime     furosemide (LASIX) tablet 40 mg  40 mg Oral QAM     levothyroxine (SYNTHROID/LEVOTHROID) half-tab 62.5 mcg  62.5 mcg Oral Once per day on Wed     [START ON 10/4/2018] levothyroxine (SYNTHROID/LEVOTHROID) tablet 125 mcg  125 mcg Oral Once per day on Sun Mon Tue Thu Fri Sat     metoprolol succinate  25 mg Oral Daily     senna-docusate  1 tablet Oral BID    Or     senna-docusate  2 tablet Oral BID     simvastatin  40 mg Oral At Bedtime     sodium chloride (PF)  3 mL Intracatheter Q8H     sodium chloride (PF)  3 mL Intracatheter Q8H     spironolactone  50 mg Oral Daily     tiZANidine (ZANAFLEX) tablet 2 mg  2 mg Oral Daily before lunch     tiZANidine  4 mg Oral At Bedtime       Data     Recent Labs  Lab 10/03/18  0605 10/02/18  0732   WBC 10.7 5.5   HGB 11.0*  12.0   MCV 95 95    153   INR  --  0.94    144   POTASSIUM 3.7 3.9   CHLORIDE 103 108   CO2 26 26   BUN 37* 45*   CR 1.70* 1.92*   ANIONGAP 10 10   OSBALDO 8.2* 8.5   * 112*       Recent Results (from the past 24 hour(s))   XR Chest Port 1 View    Narrative    CHEST ONE VIEW PORTABLE  10/3/2018 6:40 AM     HISTORY:  Status post right thoracotomy, wedge RLL lung.     COMPARISON: Chest x-ray 10/2/2018.      Impression    IMPRESSION: Portable view of the chest is performed. Consolidation  medial right lung base appears improved. Interstitial changes on prior  exam in the left lung have also improved. Right chest tube is again  present and unchanged in position. Heart is normal in size. No  evidence of pneumothorax or significant pleural effusion. Subcutaneous  emphysema is noted along the right lateral chest wall.    JB LEON MD

## 2018-10-03 NOTE — PROGRESS NOTES
THORACIC SURGERY POD #1 s/p right VATS converted to right thoracotomy, wedge resection right lower lobe lung nodule.      Doing well.  Didn't sleep too much last night.  Would like her tizanidine resumed.  Would like to have a copy of her pathology report once available to review with her nephew's wife who works at the [employer].  Pain fairly well controlled but noting quite a bit of heart burn.  No air leak.     I/O:  350 from chest tube since placement, 60 overnight.     /63 (BP Location: Left arm)  Pulse 62  Temp 97.6  F (36.4  C) (Oral)  Resp 13  SpO2 94%  Incision c/d/i, surrounding induration without erythema, crepitus, or fluctuance.  OnQ infusing.   Respirations unlabored at rest  No air leak.  Serosang output from chest tube.     CXR shows good reexpansion right lung on my read, no PTX     CT to water seal.  Clamp tonight.  May be able to D/C as soon as tomorrow but more likely Friday.   Add tizanidine for sleep.  Ambulate.  Aggressive pulmonary toilet.   Appreciate IM assistance with medical comorbidities    Pathology pending.    DEBBIE SAVAGE MS, PA-C  [facility]  office: [phone]  cell: [phone]  Available M-F, 7AM-5PM

## 2018-10-04 ENCOUNTER — APPOINTMENT (OUTPATIENT)
Dept: GENERAL RADIOLOGY | Facility: CLINIC | Age: 78
DRG: 164 | End: 2018-10-04
Attending: PHYSICIAN ASSISTANT
Payer: MEDICARE

## 2018-10-04 VITALS
OXYGEN SATURATION: 96 % | DIASTOLIC BLOOD PRESSURE: 53 MMHG | TEMPERATURE: 97.6 F | RESPIRATION RATE: 18 BRPM | HEART RATE: 44 BPM | SYSTOLIC BLOOD PRESSURE: 102 MMHG

## 2018-10-04 LAB
GLUCOSE BLDC GLUCOMTR-MCNC: 101 MG/DL (ref 70–99)
HGB BLD-MCNC: 10.1 G/DL (ref 11.7–15.7)

## 2018-10-04 PROCEDURE — 99232 SBSQ HOSP IP/OBS MODERATE 35: CPT | Performed by: HOSPITALIST

## 2018-10-04 PROCEDURE — 00000146 ZZHCL STATISTIC GLUCOSE BY METER IP

## 2018-10-04 PROCEDURE — 25000128 H RX IP 250 OP 636: Performed by: PHYSICIAN ASSISTANT

## 2018-10-04 PROCEDURE — 25000132 ZZH RX MED GY IP 250 OP 250 PS 637: Mod: GY | Performed by: PHYSICIAN ASSISTANT

## 2018-10-04 PROCEDURE — 25000132 ZZH RX MED GY IP 250 OP 250 PS 637: Mod: GY

## 2018-10-04 PROCEDURE — A9270 NON-COVERED ITEM OR SERVICE: HCPCS | Mod: GY | Performed by: PHYSICIAN ASSISTANT

## 2018-10-04 PROCEDURE — 71045 X-RAY EXAM CHEST 1 VIEW: CPT

## 2018-10-04 PROCEDURE — 36415 COLL VENOUS BLD VENIPUNCTURE: CPT | Performed by: HOSPITALIST

## 2018-10-04 PROCEDURE — 85018 HEMOGLOBIN: CPT | Performed by: HOSPITALIST

## 2018-10-04 RX ORDER — AMOXICILLIN 250 MG
1-2 CAPSULE ORAL 2 TIMES DAILY PRN
Qty: 30 TABLET | Refills: 0 | Status: ON HOLD | OUTPATIENT
Start: 2018-10-04 | End: 2018-10-19

## 2018-10-04 RX ORDER — CLOPIDOGREL BISULFATE 75 MG/1
TABLET ORAL
Qty: 90 TABLET | Refills: 1 | Status: SHIPPED | OUTPATIENT
Start: 2018-10-05 | End: 2019-03-15

## 2018-10-04 RX ORDER — CLOPIDOGREL BISULFATE 75 MG/1
TABLET ORAL
Qty: 90 TABLET | Refills: 1 | COMMUNITY
Start: 2018-10-05 | End: 2018-10-04

## 2018-10-04 RX ORDER — ACETAMINOPHEN 325 MG/1
650 TABLET ORAL EVERY 4 HOURS PRN
Qty: 100 TABLET | Status: ON HOLD | COMMUNITY
Start: 2018-10-04 | End: 2018-10-16

## 2018-10-04 RX ORDER — HYDROMORPHONE HYDROCHLORIDE 2 MG/1
1-2 TABLET ORAL EVERY 6 HOURS PRN
Qty: 28 TABLET | Refills: 0 | Status: ON HOLD | OUTPATIENT
Start: 2018-10-04 | End: 2018-10-19

## 2018-10-04 RX ADMIN — Medication 2 MG: at 04:57

## 2018-10-04 RX ADMIN — ENOXAPARIN SODIUM 30 MG: 30 INJECTION SUBCUTANEOUS at 04:57

## 2018-10-04 RX ADMIN — Medication 2 MG: at 10:43

## 2018-10-04 RX ADMIN — ACETAMINOPHEN 975 MG: 325 TABLET, FILM COATED ORAL at 13:16

## 2018-10-04 RX ADMIN — CARBOXYMETHYLCELLULOSE SODIUM 1 DROP: 5 SOLUTION/ DROPS OPHTHALMIC at 07:59

## 2018-10-04 RX ADMIN — SENNOSIDES AND DOCUSATE SODIUM 2 TABLET: 8.6; 5 TABLET ORAL at 07:59

## 2018-10-04 RX ADMIN — ALLOPURINOL 100 MG: 100 TABLET ORAL at 07:59

## 2018-10-04 RX ADMIN — Medication 1 MG: at 00:26

## 2018-10-04 RX ADMIN — TIZANIDINE 2 MG: 2 TABLET ORAL at 10:46

## 2018-10-04 RX ADMIN — ALBUTEROL SULFATE 2 PUFF: 90 AEROSOL, METERED RESPIRATORY (INHALATION) at 07:59

## 2018-10-04 RX ADMIN — LEVOTHYROXINE SODIUM 125 MCG: 125 TABLET ORAL at 08:03

## 2018-10-04 RX ADMIN — ACETAMINOPHEN 975 MG: 325 TABLET, FILM COATED ORAL at 04:55

## 2018-10-04 ASSESSMENT — ACTIVITIES OF DAILY LIVING (ADL)
ADLS_ACUITY_SCORE: 15

## 2018-10-04 NOTE — PLAN OF CARE
Problem: Lung Surgery (via Thoracotomy) (Adult)  Goal: Signs and Symptoms of Listed Potential Problems Will be Absent, Minimized or Managed (Lung Surgery)  Signs and symptoms of listed potential problems will be absent, minimized or managed by discharge/transition of care (reference Lung Surgery (via Thoracotomy) (Adult) [facility]).   Outcome: Improving  0516-8487: VSS on room air. A/Ox4. Incision at thoracotomy with steri stirps, BLADIMIR. CT site dsg changed on shift, CDI. CT to water seal, to be clamped at 0000 and returned to water sea. Pain controlled with PRN dilaudid PO. Up with Asstx1.  Regular diet poor appetite d/t acid reflux/indigestion complaints. PRN tums and zofran given. BS active, Flatus +. Voiding adequatley to bathroom, no BM yet. LS clear.

## 2018-10-04 NOTE — PLAN OF CARE
Problem: Patient Care Overview  Goal: Plan of Care/Patient Progress Review  Outcome: Improving  A&OX4. SBA. Ambulated in the halls x 2. Did well. Pain well controlled with PRN PO dilaudid. Tolerating 'regular' diet well. Free from N/V/D. Chest Tube to water seal, as ordered. No output for this shift. No crepitus. No air leak. Ye and hypotensive in the morning. Asymptomatic. Writer HELD 4 of pt's scheduled morning medications. MD updated and aware. BP and HR improved later in the afternoon. All incisions C/D/I. No drainage. Afebrile. Vitals stable.

## 2018-10-04 NOTE — PROGRESS NOTES
Thoracic Surgery POD #2:  /56 (BP Location: Left arm)  Pulse 58  Temp 97.4  F (36.3  C) (Oral)  Resp 18  SpO2 94%  CXR: no PTX with CT clamped  CT: serous output, 120 ml over 24 hours  Final path: RLL adenocarcinoma, stage IB  S: Doing well- pain managed, not SOB, eating well, walking. Discussed final path, follow up, wound care, On-Q, and left upper lobectomy to resect known cancer in [facility] on 10/16/18.  O: Inc: benign, no erythema, dry  On-Q: infusing  CT: DCed without complication.  Occlusive dressing applied.  P: discontinue home today-- follow up with me with CXR 10/15 with planned left upper lobectomy on 10/16/18. Will restart Plavix tomorrow and then discontinue it again on 10/9 in preparation for surgery.    Joanna Lala PA-C with Dr. Levy Jaquez  [facility]  Cell [phone]

## 2018-10-04 NOTE — DISCHARGE INSTRUCTIONS
"[facility]  Discharge Orders & Follow-up Care  Video-Assisted Thoracoscopy or Thoracotomy    You already have your follow-up appointments scheduled for you:  Please go to [facility] for a chest x-ray at __1:20 pm on Monday, October 15_. [facility] is located in the same building ([facility], [address] [place] [address]) as Dr. Jaquez' office. They are in [address].  Please then go for your post-operative follow-up appointment in Dr. Jaquez' office, on the second floor of the [facility], [address] at _1:40 pm on Monday, October 15_. You will see either Joanna Lala PA-C or Dr. Jaquez during your appointment.      Call Bhumika at Dr. Jaquez  office at [phone] for scheduling questions    A. Patient Care:  Call Dr Jaquez  office @ [phone] if you experience:  *Severe chills or a fever or 101 F or higher on two occasions  *Increased incisional pain that cannot be relieved with rest or pain medications  *Presence of unusual incisional or chest tube site drainage that is odorous, green or yellow in color, or if your incision is warm, red or swollen  *Coughing up bright red blood or greenish-yellow secretions  *Chest pain that gets worse with deep breathing or a significant increase in shortness of breath  *Inability to urinate or have a bowel movement  *New pain or swelling in your legs    In an emergency, call [phone] or have someone drive you to the nearest Emergency Department    Pain Relief:  You may have been given a prescription for narcotic pain medicine.  You may also take acetaminophen either as a new prescription  or over the counter.  Recommended dosages:  600 650 mg Acetaminophen every 4 hours as needed.  Many patients get good pain relief by \"staggering\" these medications.     Constipation:  Narcotic pain medication, general anesthesia, and time in the hospital with less activity than normal can all cause constipation. Please " take a stool softener (what you have at home or one that was prescribed during hospital discharge, such as Senokot-S, docusate sodium, Miralax, Milk of Magnesia) while you are taking narcotics to prevent constipation. Stop taking the stool softener once you are done taking narcotics or if you begin having loose stools/diarrhea. Please call our clinic nurse, Gricel, at [phone] if you are not having success (not having BMs) with your current stool softener.     No driving while on narcotics.     Activity:  _XXX__ No heavy lifting greater than 8-10 pounds on the operative side for 4-6 weeks for a thoracotomy    Wound Care:  *You should look at your incision each day and keep it clean while it heals  *Do not apply any creams, salves such as Bacitracin, or ointments on the incision while it is healing  * Steri strips (thin white paper strips) will be present on the incision(s) and they will peel off as your incision heals-- otherwise, they will be removed at your post-op appointment.    *Remove the dressings covering your chest tube site 48 hours (Saturday, October 6) after your discharge from the hospital. You may then shower.  Wash the incision and chest tube site(s) daily with soap and water. No bathing or immersing incision underwater for approximately 2 weeks or until the chest tube sites are completely healed.     Place a dry gauze dressing (and tape) over the chest tube site because it is normal to have some drainage for a few days after the chest tube is removed.  Do not be alarmed if a large amount of fluid drains (should be pink or yellow) either spontaneously or with coughing or exertion. If this happens, just place a larger dry gauze dressing over the chest tube site-- it will stop and scab over in about a week or so. Once the drainage stops, you can stop covering the chest tube site with a dressing. Call our office if the drainage is milky or green in color or foul-smelling.    B. Respiratory:  _XXX__  Utilize Incentive spirometer and flutter valve/acapella (if you received one) 10 times in a row every few hours while awake for a few weeks after discharging home from the hospital    C. Activity:  It may take a few months to regain your normal energy level/stamina. It is important during your recovery to get regular physical activity:  *walk each day at a comfortable pace  *climb stairs as tolerated  *take some rest periods each day but try not to take too many long naps, as this can affect your sleep at night    D. Returning to Work:  Time away from work will depend on your situation. In general, you will need between 1-6 weeks to recover from surgery. Specific dates for returning to work can be discussed at your post-op appointments.       Directions for On-Q Pain Pump Removal:  1. Remove the dressing covering the catheter site.  2. Grasp the catheter close to the skin and gently pull on the catheter. It should be easy to remove and not painful. If it becomes hard to remove or stretches, then stop and call   office.  3. Do not cut or pull hard to remove the catheter.  4. After you remove the catheter, check the catheter tip for a black marking to ensure the entire catheter was removed. Call our office if you don't see the black marking.  5. Place a band-aid over the catheter site if needed.  6. Call our office is you have redness, warmth or excessive bleeding from the catheter site or if there is a large bruise or swollen area around the site.    Revised May 2018    *Restart your Plavix tomorrow, 10/06/18 and then stop taking it as of 10/9/18

## 2018-10-04 NOTE — PLAN OF CARE
Problem: Patient Care Overview  Goal: Plan of Care/Patient Progress Review  Outcome: Improving  AOx4, VSS, Pain controlled w/PRN dilaudid and scheduled tylenol, R CT clamped overnight back to  Waterseal this am, CDI dressing, no AL, +Crepitus. Thoracotomy incision sites steri stripped open to air, OnQ pump infusing, sensors taped, clamps open. IS to 1500. Green acapella. Ambulating and up to chair up w/1. Voiding.

## 2018-10-04 NOTE — PROGRESS NOTES
[facility]    Hospitalist Progress Note      Assessment & Plan   Marina Harrison is a 78 year old female who was admitted on 10/2/2018.  Past medical history left lung cancer pending surgical intervention in the near future, HFpEF, CKD, COPD, PVD,  HTN who is status post right lower lobe lung nodule wedge resection.  The Hospitalist Service has been asked to see her in consultation to assist with medical co-management.           RLL nodule s/p wedge resection 10/2  Uncomplicated procedure performed by Dr. Sherwood.  Pathology shows adenocarcinoma.  - post-op management per Thoracic surgery     COPD  Reportedly underwent PFT's 9/20, results unavailable.  - Continue PTA p.r.n. albuterol      HTN  PVD s/p aortobifemoral bypass.   CAD  HFpEF  Nuc stress 10/2009 with area of reversible ischemic, but no significant CAD noted on angiogram 11/2009.  EF 65% on stress test.    - hypotensive to 80's and yayo to 40's this AM; patient asymptomatic  - holding metoprolol, lasix and aldactone (hold parameters placed)  - repeat hgb level  - if discharges today, will discuss blood pressure monitoring and meds as outpatient  - resume Plavix once safe from surgical perspective     CKD stage III  Baseline creatinine of 1.8-2, currently stable.  -continue calcitriol     Hypothyroidism   - Continue PTA levothyroxine.      Acid reflux  Reportedly due to large hiatal hernia.   - Continue PTA H2 blockers.      Gout.   - Continue PTA allopurinol.     DVT Prophylaxis: Defer to primary service  Code Status: Full Code    Disposition: Expected discharge per Thoracic surgery.    Dimitris Reed    Interval History   Denies any lightheadedness, fatigue, dyspnea.  Pain is well controlled.  Reports she has a blood pressure machine at home but is unable to work it.  States BP typically runs 130's systolic.      -Data reviewed today: I reviewed all new labs and imaging results over the last 24 hours. I personally reviewed no images  or EKG's today.    Physical Exam   Temp: 97.4  F (36.3  C) Temp src: Oral BP: (!) 88/50 Pulse: 58 Heart Rate: (!) 45 Resp: 18 SpO2: 92 % O2 Device: None (Room air)    There were no vitals filed for this visit.  Vital Signs with Ranges  Temp:  [97.4  F (36.3  C)-97.8  F (36.6  C)] 97.4  F (36.3  C)  Pulse:  [51-59] 58  Heart Rate:  [45] 45  Resp:  [14-18] 18  BP: ()/(41-57) 88/50  SpO2:  [92 %-98 %] 92 %  I/O last 3 completed shifts:  In: 400 [P.O.:400]  Out: 1015 [Urine:875; Chest Tube:140]    Constitutional: Well developed, well nourished female in no acute distress  Respiratory: Lungs clear bilaterally, no wheezing or crackles, no tachypnea  Cardiovascular: bradycardic, regular rhythm, normal S1/S2 without murmur, rubs or gallops  GI: abdomen soft, normal bowel sounds  Skin/Integumen:   Other:  alert and appropriate, cranial nerves grossly intact    Medications     disposable pump w/ anesthetic       - MEDICATION INSTRUCTIONS -         acetaminophen  975 mg Oral Q8H     aerochamber  1 each Inhalation Once     allopurinol  100 mg Oral Daily     bacitracin   Topical TID     calcitRIOL  0.25 mcg Oral Every Other Day     Carboxymethylcellulose Sod PF  1 drop Both Eyes Daily     enoxaparin  30 mg Subcutaneous Q24H     famotidine  20 mg Oral At Bedtime     furosemide (LASIX) tablet 20 mg  20 mg Oral At Bedtime     furosemide (LASIX) tablet 40 mg  40 mg Oral QAM     levothyroxine (SYNTHROID/LEVOTHROID) half-tab 62.5 mcg  62.5 mcg Oral Once per day on Wed     levothyroxine (SYNTHROID/LEVOTHROID) tablet 125 mcg  125 mcg Oral Once per day on Sun Mon Tue Thu Fri Sat     metoprolol succinate  25 mg Oral Daily     senna-docusate  1 tablet Oral BID    Or     senna-docusate  2 tablet Oral BID     simvastatin  40 mg Oral At Bedtime     sodium chloride (PF)  3 mL Intracatheter Q8H     sodium chloride (PF)  3 mL Intracatheter Q8H     spironolactone  50 mg Oral Daily     tiZANidine (ZANAFLEX) tablet 2 mg  2 mg Oral Daily  before lunch     tiZANidine  4 mg Oral At Bedtime       Data     Recent Labs  Lab 10/03/18  0605 10/02/18  0732   WBC 10.7 5.5   HGB 11.0* 12.0   MCV 95 95    153   INR  --  0.94    144   POTASSIUM 3.7 3.9   CHLORIDE 103 108   CO2 26 26   BUN 37* 45*   CR 1.70* 1.92*   ANIONGAP 10 10   OSBALDO 8.2* 8.5   * 112*       Recent Results (from the past 24 hour(s))   XR Chest Port 1 View    Narrative    CHEST ONE VIEW PORTABLE       PROCEDURE DATE:  10/4/2018 5:15 AM     HISTORY:   s/p right thoracotomy, wedge RLL lung;     COMPARISON:  10/3/2018    FINDINGS/    Impression    IMPRESSION:   Right-sided chest tube is in similar position. Cardiomediastinal  silhouette is within normal limits. No pneumothorax or significant  pleural effusion. Continued subcutaneous emphysema within the right  lateral soft tissues. Bibasilar atelectasis.    MACARENA MAZARIEGOS MD

## 2018-10-04 NOTE — PROGRESS NOTES
Discharge criteria met. Received discharge order. Discharge instructions reviewed with pt. Pt verbalized understanding. All questions answered. Rx filled and given to pt. Pt left the floor in W/C with family and staff.

## 2018-10-08 ENCOUNTER — DOCUMENTATION ONLY (OUTPATIENT)
Dept: OTHER | Facility: CLINIC | Age: 78
End: 2018-10-08

## 2018-10-08 NOTE — DISCHARGE SUMMARY
THORACIC SURGERY HOSPITAL DISCHARGE SUMMARY  [facility] ONCOLOGY - THORACIC SURGERY  [address], [address]  [place] [address]  Phone [phone]  [email or web address]    10/8/2018     Cody Obando   [address] / [facility] [address]  Phone: [phone]   Fax: [phone]        Re: Marina Hallne             1940             [id number]              Dates of Hospitalization: 10/2/2018 - 10/4/2018   Date of Service (when I saw the patient): 10/04/18    Dear Dr. Obando:     As you are aware, we had the pleasure of caring for your patient,  Marina Harrison here at [facility].  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  There was a second nodule which is subpleural, located at the base of the right lower lobe lung.  Both areas are hypermetabolic and suspicious for 2 separate primaries.  Options of diagnostic procedure and treatment were reviewed.  A video thoracoscopy, possible limited thoracotomy, and wedge resection is indicated for diagnosis and treatment on the right side  at this time.     On 10/2/2018, Dr. Levy Jaquez performed the following:    Procedure/Surgery Information   Procedure: 1.  Right video-assisted thoracoscopy.   2.  Limited right thoracotomy.   3.  Wedge resection, right lower lobe lung nodule.    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 No lymphovascular invasion  and surgical margins clear of tumor. Final pathologic stage [id number], Final Stage IB.    Her post-operative course was unremarkable.      Consultations This Hospital Stay   HOSPITALIST IP CONSULT    Marina Harrison has otherwise recovered sufficiently to be discharged to home today, 10/4/2018, on post-operative day number two for further convalescence.  Her incisions are healing well with no signs or symptoms of infection.  Her bowels have moved sufficiently and she is tolerating diet and activity, ambulating and transferring independently.  She is currently afebrile with stable vital signs.     Below, you will find a full discharge medication list and instructions.  We have arranged for Marina Harrison to follow-up with us in our [facility] in 7-10 days with a Chest X-ray prior to that appointment.  We then plan to operate on her left lung to resect the pathology proven cancer on that side on October 16. We thank you for allowing us to participate in the care of Marina Harrison here at [facility].  Please feel free to contact our office at [phone] with any questions or concerns or if we can be of any further assistance in the care of this patient.    Sincerely,    Dr. Levy Jaquez MD    D/C Summary Prepared by: Joanna Lala PA-C    Discharge Medications:  Discharge Medication List as of 10/4/2018  4:13 PM      START taking these medications    Details   HYDROmorphone (DILAUDID) 2 MG tablet Take 0.5-1 tablets (1-2 mg) by mouth every 6 hours as needed for moderate to severe pain (pain control or improvement in physical function. Hold dose for analgesic side effects.), Disp-28 tablet, R-0, Local Print      senna-docusate (SENOKOT-S;PERICOLACE) 8.6-50 MG per tablet Take 1-2 tablets by mouth 2 times daily as needed for constipation, Disp-30 tablet, R-0, E-Prescribe         CONTINUE these medications which have CHANGED    Details   acetaminophen (TYLENOL) 325 MG tablet  Take 2 tablets (650 mg) by mouth every 4 hours as needed, Disp-100 tablet, Historical      clopidogrel (PLAVIX) 75 MG tablet TAKE 1 TABLET EVERY DAY, Disp-90 tablet, R-1, E-Prescribe         CONTINUE these medications which have NOT CHANGED    Details   albuterol (PROAIR HFA/PROVENTIL HFA/VENTOLIN HFA) 108 (90 Base) MCG/ACT inhaler Inhale 2 puffs into the lungs every 6 hours as needed for shortness of breath / dyspnea or wheezing, Disp-1 Inhaler, R-5, Local Print      allopurinol (ZYLOPRIM) 100 MG tablet Take 1 tablet (100 mg) by mouth daily, Disp-90 tablet, R-1, E-Prescribe      calcitRIOL (ROCALTROL) 0.25 MCG capsule Take 1 capsule by mouth every other day., Disp-90 capsule, R-1, Historical      Carboxymethylcellul-Glycerin (CLEAR EYES FOR DRY EYES) 1-0.25 % SOLN Apply 1 drop to eye daily, Historical      diclofenac (VOLTAREN) 1 % GEL Apply 2 grams to hands four times daily prn using enclosed dosing card.Disp-100 g, [id number]      EPINEPHrine (EPIPEN) 0.3 MG/0.3ML injection Inject 0.3 mLs (0.3 mg) into the muscle once as needed for anaphylaxis, Disp-1 each, R-11, Local Print      famciclovir (FAMVIR) 500 MG tablet TAKE 1 TABLET TWICE DAILY, Disp-14 tablet, R-3, E-Prescribe      !! Furosemide (LASIX PO) Take 20 mg by mouth At Bedtime, Historical      !! FUROSEMIDE PO Take 40 mg by mouth every morning (2 x 20 mg = 40 mg dose), Historical      !! LEVOTHYROXINE SODIUM PO Take 125 mcg by mouth See Admin Instructions 6 times per week, Historical      !! LEVOTHYROXINE SODIUM PO Take 62.5 mcg by mouth once a week (Takes 0.5 x 125mcg tablet = 62.5mcg dose), Historical      metoprolol succinate (TOPROL-XL) 25 MG 24 hr tablet TAKE 1 TABLET EVERY DAY, Disp-90 tablet, R-3, E-Prescribe      multivitamin  with lutein (OCUVITE WITH LTEIN) CAPS Take 1 capsule by mouth daily, Historical      predniSONE (DELTASONE) 20 MG tablet TAKE ONE TABLET BY MOUTH TWICE DAILY ONLY WHEN NEEDED FOR GOUT, Disp-10 tablet, R-3, Local Print       simvastatin (ZOCOR) 40 MG tablet TAKE 1 TABLET EVERY DAY, Disp-90 tablet, R-3, E-Prescribe      spironolactone (ALDACTONE) 50 MG tablet Take 1 tablet (50 mg) by mouth daily, Disp-30 tablet, R-1, Historical      !! TiZANidine HCl (ZANAFLEX PO) Take 4 mg by mouth At Bedtime (2 x 2 mg = 4 mg dose), Historical      !! TIZANIDINE HCL PO Take 2 mg by mouth daily (before lunch) At noon, Historical       !! - Potential duplicate medications found. Please discuss with provider.              Discharge Instructions:  1) Remove chest tube dressing on 10/06/18 and then it is Ok to shower.  Please wash both incision and chest tube site daily with soap and water.  You may cover the chest tube site daily with a clean band-aid or dry gauze if it continues to drain.  2) Steri-strips can be removed in 1 week or they will fall off when they are ready.  3) Continue daily use of your Incentive Spirometer, set of 10x in a row, every 1-2 hours while you are awake during the day.  4) No lifting, pushing or pulling >8-10 lbs for 4 weeks from the day of your surgery.  No driving while on narcotic pain medications.  5) Resume your Plavix but discontinue it on 10/09/18 in preparation for her next surgery on Oct. 16.    Follow-Up Care:  1) Follow up with Joanna Lala PA-C/Dr. Jaquez at the [place] [facility] in [place] ([address] [place] [address], [address], [place] [address]).  Call Bhumika at [phone] to schedule the appointment.  2) Follow up with Primary Care Provider, Cody Obando within 1 month of discharge for routine post-surgical care, wound check and follow up.  Please call [phone] to arrange this appointment.       CC  Patient Care Team:  Cody Obando MD as PCP - General (Internal Medicine)

## 2018-10-08 NOTE — PROGRESS NOTES
THORACIC SURGERY HOSPITAL DISCHARGE SUMMARY  [facility] ONCOLOGY - THORACIC SURGERY  [address], [address]  [place] [address]  Phone [phone]  [email or web address]    10/8/2018     Cody Obando   [address] / [facility] [address]  Phone: [phone]   Fax: [phone]        Re: Marina Hallne             1940             [id number]              Dates of Hospitalization: 10/2/2018 - 10/4/2018   Date of Service (when I saw the patient): 10/04/18    Dear Dr. Obando:     As you are aware, we had the pleasure of caring for your patient,  Marina Harrison here at [facility].  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  There was a second nodule which is subpleural, located at the base of the right lower lobe lung.  Both areas are hypermetabolic and suspicious for 2 separate primaries.  Options of diagnostic procedure and treatment were reviewed.  A video thoracoscopy, possible limited thoracotomy, and wedge resection is indicated for diagnosis and treatment on the right side  at this time.     On 10/2/2018, Dr. Levy Jaquez performed the following:    Procedure/Surgery Information   Procedure: 1.  Right video-assisted thoracoscopy.   2.  Limited right thoracotomy.   3.  Wedge resection, right lower lobe lung nodule.    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 No lymphovascular invasion  and surgical margins clear of tumor. Final pathologic stage [id number], Final Stage IB.    Her post-operative course was unremarkable.      Consultations This Hospital Stay   HOSPITALIST IP CONSULT    Marina Harrison has otherwise recovered sufficiently to be discharged to home today, 10/4/2018, on post-operative day number two for further convalescence.  Her incisions are healing well with no signs or symptoms of infection.  Her bowels have moved sufficiently and she is tolerating diet and activity, ambulating and transferring independently.  She is currently afebrile with stable vital signs.     Below, you will find a full discharge medication list and instructions.  We have arranged for Marina Harrison to follow-up with us in our [facility] in 7-10 days with a Chest X-ray prior to that appointment.  We then plan to operate on her left lung to resect the pathology proven cancer on that side on October 16. We thank you for allowing us to participate in the care of Marina Harrison here at [facility].  Please feel free to contact our office at [phone] with any questions or concerns or if we can be of any further assistance in the care of this patient.    Sincerely,    Dr. Levy Jaquez MD    D/C Summary Prepared by: Joanna Lala PA-C    Discharge Medications:  Discharge Medication List as of 10/4/2018  4:13 PM      START taking these medications    Details   HYDROmorphone (DILAUDID) 2 MG tablet Take 0.5-1 tablets (1-2 mg) by mouth every 6 hours as needed for moderate to severe pain (pain control or improvement in physical function. Hold dose for analgesic side effects.), Disp-28 tablet, R-0, Local Print      senna-docusate (SENOKOT-S;PERICOLACE) 8.6-50 MG per tablet Take 1-2 tablets by mouth 2 times daily as needed for constipation, Disp-30 tablet, R-0, E-Prescribe         CONTINUE these medications which have CHANGED    Details   acetaminophen (TYLENOL) 325 MG tablet  Take 2 tablets (650 mg) by mouth every 4 hours as needed, Disp-100 tablet, Historical      clopidogrel (PLAVIX) 75 MG tablet TAKE 1 TABLET EVERY DAY, Disp-90 tablet, R-1, E-Prescribe         CONTINUE these medications which have NOT CHANGED    Details   albuterol (PROAIR HFA/PROVENTIL HFA/VENTOLIN HFA) 108 (90 Base) MCG/ACT inhaler Inhale 2 puffs into the lungs every 6 hours as needed for shortness of breath / dyspnea or wheezing, Disp-1 Inhaler, R-5, Local Print      allopurinol (ZYLOPRIM) 100 MG tablet Take 1 tablet (100 mg) by mouth daily, Disp-90 tablet, R-1, E-Prescribe      calcitRIOL (ROCALTROL) 0.25 MCG capsule Take 1 capsule by mouth every other day., Disp-90 capsule, R-1, Historical      Carboxymethylcellul-Glycerin (CLEAR EYES FOR DRY EYES) 1-0.25 % SOLN Apply 1 drop to eye daily, Historical      diclofenac (VOLTAREN) 1 % GEL Apply 2 grams to hands four times daily prn using enclosed dosing card.Disp-100 g, [id number]-[id number]-[place]kose      EPINEPHrine (EPIPEN) 0.3 MG/0.3ML injection Inject 0.3 mLs (0.3 mg) into the muscle once as needed for anaphylaxis, Disp-1 each, R-11, Local Print      famciclovir (FAMVIR) 500 MG tablet TAKE 1 TABLET TWICE DAILY, Disp-14 tablet, R-3, E-Prescribe      !! Furosemide (LASIX PO) Take 20 mg by mouth At Bedtime, Historical      !! FUROSEMIDE PO Take 40 mg by mouth every morning (2 x 20 mg = 40 mg dose), Historical      !! LEVOTHYROXINE SODIUM PO Take 125 mcg by mouth See Admin Instructions 6 times per week, Historical      !! LEVOTHYROXINE SODIUM PO Take 62.5 mcg by mouth once a week (Takes 0.5 x 125mcg tablet = 62.5mcg dose), Historical      metoprolol succinate (TOPROL-XL) 25 MG 24 hr tablet TAKE 1 TABLET EVERY DAY, Disp-90 tablet, R-3, E-Prescribe      multivitamin  with lutein (OCUVITE WITH LTEIN) CAPS Take 1 capsule by mouth daily, Historical      predniSONE (DELTASONE) 20 MG tablet TAKE ONE TABLET BY MOUTH TWICE DAILY ONLY WHEN NEEDED FOR GOUT, Disp-10 tablet, R-3, Local Print       simvastatin (ZOCOR) 40 MG tablet TAKE 1 TABLET EVERY DAY, Disp-90 tablet, R-3, E-Prescribe      spironolactone (ALDACTONE) 50 MG tablet Take 1 tablet (50 mg) by mouth daily, Disp-30 tablet, R-1, Historical      !! TiZANidine HCl (ZANAFLEX PO) Take 4 mg by mouth At Bedtime (2 x 2 mg = 4 mg dose), Historical      !! TIZANIDINE HCL PO Take 2 mg by mouth daily (before lunch) At noon, Historical       !! - Potential duplicate medications found. Please discuss with provider.              Discharge Instructions:  1) Remove chest tube dressing on 10/06/18 and then it is Ok to shower.  Please wash both incision and chest tube site daily with soap and water.  You may cover the chest tube site daily with a clean band-aid or dry gauze if it continues to drain.  2) Steri-strips can be removed in 1 week or they will fall off when they are ready.  3) Continue daily use of your Incentive Spirometer, set of 10x in a row, every 1-2 hours while you are awake during the day.  4) No lifting, pushing or pulling >8-10 lbs for 4 weeks from the day of your surgery.  No driving while on narcotic pain medications.  5) Resume your Plavix but discontinue it on 10/09/18 in preparation for her next surgery on Oct. 16.    Follow-Up Care:  1) Follow up with Joanna Lala PA-C/Dr. Jaquez at the [place] [facility] in [place] ([address] [place] [address], [address], [place] [address]).  Call Bhumika at [phone] to schedule the appointment.  2) Follow up with Primary Care Provider, Cody Obando within 1 month of discharge for routine post-surgical care, wound check and follow up.  Please call [phone] to arrange this appointment.       CC  Patient Care Team:  Cody Obando MD as PCP - General (Internal Medicine)

## 2018-10-15 ENCOUNTER — TRANSFERRED RECORDS (OUTPATIENT)
Dept: HEALTH INFORMATION MANAGEMENT | Facility: CLINIC | Age: 78
End: 2018-10-15

## 2018-10-16 ENCOUNTER — ANESTHESIA EVENT (OUTPATIENT)
Dept: SURGERY | Facility: CLINIC | Age: 78
DRG: 164 | End: 2018-10-16
Payer: MEDICARE

## 2018-10-16 ENCOUNTER — APPOINTMENT (OUTPATIENT)
Dept: GENERAL RADIOLOGY | Facility: CLINIC | Age: 78
DRG: 164 | End: 2018-10-16
Attending: THORACIC SURGERY (CARDIOTHORACIC VASCULAR SURGERY)
Payer: MEDICARE

## 2018-10-16 ENCOUNTER — ANESTHESIA (OUTPATIENT)
Dept: SURGERY | Facility: CLINIC | Age: 78
DRG: 164 | End: 2018-10-16
Payer: MEDICARE

## 2018-10-16 ENCOUNTER — HOSPITAL ENCOUNTER (INPATIENT)
Facility: CLINIC | Age: 78
LOS: 3 days | Discharge: HOME | DRG: 164 | End: 2018-10-19
Attending: THORACIC SURGERY (CARDIOTHORACIC VASCULAR SURGERY) | Admitting: THORACIC SURGERY (CARDIOTHORACIC VASCULAR SURGERY)
Payer: MEDICARE

## 2018-10-16 DIAGNOSIS — T40.2X5A CONSTIPATION DUE TO OPIOID THERAPY: ICD-10-CM

## 2018-10-16 DIAGNOSIS — R91.1 RIGHT LOWER LOBE PULMONARY NODULE: ICD-10-CM

## 2018-10-16 DIAGNOSIS — K59.03 CONSTIPATION DUE TO OPIOID THERAPY: ICD-10-CM

## 2018-10-16 DIAGNOSIS — G89.12 POST-THORACOTOMY PAIN: ICD-10-CM

## 2018-10-16 LAB
ABO + RH BLD: NORMAL
ABO + RH BLD: NORMAL
ANION GAP SERPL CALCULATED.3IONS-SCNC: 7 MMOL/L (ref 3–14)
BLD GP AB SCN SERPL QL: NORMAL
BLOOD BANK CMNT PATIENT-IMP: NORMAL
BUN SERPL-MCNC: 26 MG/DL (ref 7–30)
CALCIUM SERPL-MCNC: 8.7 MG/DL (ref 8.5–10.1)
CHLORIDE SERPL-SCNC: 107 MMOL/L (ref 94–109)
CO2 SERPL-SCNC: 27 MMOL/L (ref 20–32)
CREAT SERPL-MCNC: 1.67 MG/DL (ref 0.52–1.04)
ERYTHROCYTE [DISTWIDTH] IN BLOOD BY AUTOMATED COUNT: 13.6 % (ref 10–15)
GFR SERPL CREATININE-BSD FRML MDRD: 30 ML/MIN/1.7M2
GLUCOSE SERPL-MCNC: 105 MG/DL (ref 70–99)
HCT VFR BLD AUTO: 34.4 % (ref 35–47)
HGB BLD-MCNC: 11.2 G/DL (ref 11.7–15.7)
INR PPP: 1.01 (ref 0.86–1.14)
MCH RBC QN AUTO: 31.3 PG (ref 26.5–33)
MCHC RBC AUTO-ENTMCNC: 32.6 G/DL (ref 31.5–36.5)
MCV RBC AUTO: 96 FL (ref 78–100)
PLATELET # BLD AUTO: 204 10E9/L (ref 150–450)
POTASSIUM SERPL-SCNC: 4.1 MMOL/L (ref 3.4–5.3)
RBC # BLD AUTO: 3.58 10E12/L (ref 3.8–5.2)
SODIUM SERPL-SCNC: 141 MMOL/L (ref 133–144)
SPECIMEN EXP DATE BLD: NORMAL
WBC # BLD AUTO: 8.4 10E9/L (ref 4–11)

## 2018-10-16 PROCEDURE — 88331 PATH CONSLTJ SURG 1 BLK 1SPC: CPT | Performed by: THORACIC SURGERY (CARDIOTHORACIC VASCULAR SURGERY)

## 2018-10-16 PROCEDURE — 25000132 ZZH RX MED GY IP 250 OP 250 PS 637: Mod: GY | Performed by: PHYSICIAN ASSISTANT

## 2018-10-16 PROCEDURE — 40000986 XR CHEST PORT 1 VW

## 2018-10-16 PROCEDURE — 99207 ZZC CDG-HISTORY COMP: MEETS EXP. PROBLEM FOCUSED - DOWN CODED LACK OF HPI: CPT | Performed by: NURSE PRACTITIONER

## 2018-10-16 PROCEDURE — 36000063 ZZH SURGERY LEVEL 4 EA 15 ADDTL MIN: Performed by: THORACIC SURGERY (CARDIOTHORACIC VASCULAR SURGERY)

## 2018-10-16 PROCEDURE — 86901 BLOOD TYPING SEROLOGIC RH(D): CPT | Performed by: THORACIC SURGERY (CARDIOTHORACIC VASCULAR SURGERY)

## 2018-10-16 PROCEDURE — 85027 COMPLETE CBC AUTOMATED: CPT | Performed by: THORACIC SURGERY (CARDIOTHORACIC VASCULAR SURGERY)

## 2018-10-16 PROCEDURE — 88307 TISSUE EXAM BY PATHOLOGIST: CPT | Mod: 26 | Performed by: THORACIC SURGERY (CARDIOTHORACIC VASCULAR SURGERY)

## 2018-10-16 PROCEDURE — 12000007 ZZH R&B INTERMEDIATE

## 2018-10-16 PROCEDURE — 0BBG0ZZ EXCISION OF LEFT UPPER LUNG LOBE, OPEN APPROACH: ICD-10-PCS | Performed by: THORACIC SURGERY (CARDIOTHORACIC VASCULAR SURGERY)

## 2018-10-16 PROCEDURE — 80048 BASIC METABOLIC PNL TOTAL CA: CPT | Performed by: THORACIC SURGERY (CARDIOTHORACIC VASCULAR SURGERY)

## 2018-10-16 PROCEDURE — 99207 ZZC CONSULT E&M CHANGED TO SUBSEQUENT LEVEL: CPT | Performed by: NURSE PRACTITIONER

## 2018-10-16 PROCEDURE — 86850 RBC ANTIBODY SCREEN: CPT | Performed by: THORACIC SURGERY (CARDIOTHORACIC VASCULAR SURGERY)

## 2018-10-16 PROCEDURE — 88305 TISSUE EXAM BY PATHOLOGIST: CPT | Performed by: THORACIC SURGERY (CARDIOTHORACIC VASCULAR SURGERY)

## 2018-10-16 PROCEDURE — 25000125 ZZHC RX 250: Performed by: PHYSICIAN ASSISTANT

## 2018-10-16 PROCEDURE — 99207 ZZC CDG-HISTORY COMP: MEETS EXP. PROBLEM FOCUSED-DOWN CODED LACK OF ROS: CPT | Performed by: NURSE PRACTITIONER

## 2018-10-16 PROCEDURE — 27210794 ZZH OR GENERAL SUPPLY STERILE: Performed by: THORACIC SURGERY (CARDIOTHORACIC VASCULAR SURGERY)

## 2018-10-16 PROCEDURE — 25000125 ZZHC RX 250: Performed by: NURSE ANESTHETIST, CERTIFIED REGISTERED

## 2018-10-16 PROCEDURE — 37000008 ZZH ANESTHESIA TECHNICAL FEE, 1ST 30 MIN: Performed by: THORACIC SURGERY (CARDIOTHORACIC VASCULAR SURGERY)

## 2018-10-16 PROCEDURE — 88312 SPECIAL STAINS GROUP 1: CPT | Performed by: THORACIC SURGERY (CARDIOTHORACIC VASCULAR SURGERY)

## 2018-10-16 PROCEDURE — 40000170 ZZH STATISTIC PRE-PROCEDURE ASSESSMENT II: Performed by: THORACIC SURGERY (CARDIOTHORACIC VASCULAR SURGERY)

## 2018-10-16 PROCEDURE — 88312 SPECIAL STAINS GROUP 1: CPT | Mod: 26 | Performed by: THORACIC SURGERY (CARDIOTHORACIC VASCULAR SURGERY)

## 2018-10-16 PROCEDURE — 86900 BLOOD TYPING SEROLOGIC ABO: CPT | Performed by: THORACIC SURGERY (CARDIOTHORACIC VASCULAR SURGERY)

## 2018-10-16 PROCEDURE — 25000128 H RX IP 250 OP 636: Performed by: ANESTHESIOLOGY

## 2018-10-16 PROCEDURE — 25000128 H RX IP 250 OP 636: Performed by: THORACIC SURGERY (CARDIOTHORACIC VASCULAR SURGERY)

## 2018-10-16 PROCEDURE — 40000886 ZZH STATISTIC STEP DOWN HRS DAY

## 2018-10-16 PROCEDURE — 88305 TISSUE EXAM BY PATHOLOGIST: CPT | Mod: 26 | Performed by: THORACIC SURGERY (CARDIOTHORACIC VASCULAR SURGERY)

## 2018-10-16 PROCEDURE — A9270 NON-COVERED ITEM OR SERVICE: HCPCS | Mod: GY | Performed by: PHYSICIAN ASSISTANT

## 2018-10-16 PROCEDURE — 88331 PATH CONSLTJ SURG 1 BLK 1SPC: CPT | Mod: 26 | Performed by: THORACIC SURGERY (CARDIOTHORACIC VASCULAR SURGERY)

## 2018-10-16 PROCEDURE — 88307 TISSUE EXAM BY PATHOLOGIST: CPT | Performed by: THORACIC SURGERY (CARDIOTHORACIC VASCULAR SURGERY)

## 2018-10-16 PROCEDURE — 25000128 H RX IP 250 OP 636: Performed by: NURSE ANESTHETIST, CERTIFIED REGISTERED

## 2018-10-16 PROCEDURE — 07B70ZZ EXCISION OF THORAX LYMPHATIC, OPEN APPROACH: ICD-10-PCS | Performed by: THORACIC SURGERY (CARDIOTHORACIC VASCULAR SURGERY)

## 2018-10-16 PROCEDURE — 25000128 H RX IP 250 OP 636: Performed by: PHYSICIAN ASSISTANT

## 2018-10-16 PROCEDURE — 37000009 ZZH ANESTHESIA TECHNICAL FEE, EACH ADDTL 15 MIN: Performed by: THORACIC SURGERY (CARDIOTHORACIC VASCULAR SURGERY)

## 2018-10-16 PROCEDURE — 85610 PROTHROMBIN TIME: CPT | Performed by: THORACIC SURGERY (CARDIOTHORACIC VASCULAR SURGERY)

## 2018-10-16 PROCEDURE — 36415 COLL VENOUS BLD VENIPUNCTURE: CPT | Performed by: THORACIC SURGERY (CARDIOTHORACIC VASCULAR SURGERY)

## 2018-10-16 PROCEDURE — 27110038 ZZH RX 271: Performed by: THORACIC SURGERY (CARDIOTHORACIC VASCULAR SURGERY)

## 2018-10-16 PROCEDURE — 99232 SBSQ HOSP IP/OBS MODERATE 35: CPT | Performed by: NURSE PRACTITIONER

## 2018-10-16 PROCEDURE — 25000566 ZZH SEVOFLURANE, EA 15 MIN: Performed by: THORACIC SURGERY (CARDIOTHORACIC VASCULAR SURGERY)

## 2018-10-16 PROCEDURE — 71000012 ZZH RECOVERY PHASE 1 LEVEL 1 FIRST HR: Performed by: THORACIC SURGERY (CARDIOTHORACIC VASCULAR SURGERY)

## 2018-10-16 PROCEDURE — 36000093 ZZH SURGERY LEVEL 4 1ST 30 MIN: Performed by: THORACIC SURGERY (CARDIOTHORACIC VASCULAR SURGERY)

## 2018-10-16 RX ORDER — LEVOTHYROXINE SODIUM 125 UG/1
125 TABLET ORAL
Status: DISCONTINUED | OUTPATIENT
Start: 2018-10-18 | End: 2018-10-19 | Stop reason: HOSPADM

## 2018-10-16 RX ORDER — LABETALOL HYDROCHLORIDE 5 MG/ML
10 INJECTION, SOLUTION INTRAVENOUS
Status: DISCONTINUED | OUTPATIENT
Start: 2018-10-16 | End: 2018-10-16 | Stop reason: HOSPADM

## 2018-10-16 RX ORDER — CALCIUM CARBONATE 500 MG/1
1000 TABLET, CHEWABLE ORAL 4 TIMES DAILY PRN
Status: DISCONTINUED | OUTPATIENT
Start: 2018-10-16 | End: 2018-10-18

## 2018-10-16 RX ORDER — CEFAZOLIN SODIUM 1 G/3ML
1 INJECTION, POWDER, FOR SOLUTION INTRAMUSCULAR; INTRAVENOUS SEE ADMIN INSTRUCTIONS
Status: DISCONTINUED | OUTPATIENT
Start: 2018-10-16 | End: 2018-10-16 | Stop reason: HOSPADM

## 2018-10-16 RX ORDER — AMOXICILLIN 250 MG
1 CAPSULE ORAL 2 TIMES DAILY
Status: DISCONTINUED | OUTPATIENT
Start: 2018-10-16 | End: 2018-10-19 | Stop reason: HOSPADM

## 2018-10-16 RX ORDER — DIPHENHYDRAMINE HCL 12.5MG/5ML
12.5 LIQUID (ML) ORAL EVERY 6 HOURS PRN
Status: DISCONTINUED | OUTPATIENT
Start: 2018-10-16 | End: 2018-10-16

## 2018-10-16 RX ORDER — POLYETHYLENE GLYCOL 3350 17 G/17G
17 POWDER, FOR SOLUTION ORAL DAILY PRN
Status: DISCONTINUED | OUTPATIENT
Start: 2018-10-16 | End: 2018-10-19 | Stop reason: HOSPADM

## 2018-10-16 RX ORDER — DIPHENHYDRAMINE HYDROCHLORIDE 50 MG/ML
12.5 INJECTION INTRAMUSCULAR; INTRAVENOUS EVERY 6 HOURS PRN
Status: DISCONTINUED | OUTPATIENT
Start: 2018-10-16 | End: 2018-10-16

## 2018-10-16 RX ORDER — BUPIVACAINE HYDROCHLORIDE 5 MG/ML
INJECTION, SOLUTION PERINEURAL PRN
Status: DISCONTINUED | OUTPATIENT
Start: 2018-10-16 | End: 2018-10-16 | Stop reason: HOSPADM

## 2018-10-16 RX ORDER — DEXAMETHASONE SODIUM PHOSPHATE 4 MG/ML
INJECTION, SOLUTION INTRA-ARTICULAR; INTRALESIONAL; INTRAMUSCULAR; INTRAVENOUS; SOFT TISSUE PRN
Status: DISCONTINUED | OUTPATIENT
Start: 2018-10-16 | End: 2018-10-16

## 2018-10-16 RX ORDER — PROPOFOL 10 MG/ML
INJECTION, EMULSION INTRAVENOUS PRN
Status: DISCONTINUED | OUTPATIENT
Start: 2018-10-16 | End: 2018-10-16

## 2018-10-16 RX ORDER — AMOXICILLIN 250 MG
1 CAPSULE ORAL 2 TIMES DAILY PRN
Status: DISCONTINUED | OUTPATIENT
Start: 2018-10-16 | End: 2018-10-19 | Stop reason: HOSPADM

## 2018-10-16 RX ORDER — SIMVASTATIN 40 MG
40 TABLET ORAL AT BEDTIME
Status: DISCONTINUED | OUTPATIENT
Start: 2018-10-16 | End: 2018-10-19 | Stop reason: HOSPADM

## 2018-10-16 RX ORDER — LIDOCAINE 40 MG/G
CREAM TOPICAL
Status: DISCONTINUED | OUTPATIENT
Start: 2018-10-16 | End: 2018-10-16 | Stop reason: HOSPADM

## 2018-10-16 RX ORDER — FENTANYL CITRATE 50 UG/ML
25-50 INJECTION, SOLUTION INTRAMUSCULAR; INTRAVENOUS EVERY 5 MIN PRN
Status: DISCONTINUED | OUTPATIENT
Start: 2018-10-16 | End: 2018-10-16 | Stop reason: HOSPADM

## 2018-10-16 RX ORDER — FUROSEMIDE 20 MG
20 TABLET ORAL AT BEDTIME
Status: DISCONTINUED | OUTPATIENT
Start: 2018-10-16 | End: 2018-10-16

## 2018-10-16 RX ORDER — NALOXONE HYDROCHLORIDE 0.4 MG/ML
.1-.4 INJECTION, SOLUTION INTRAMUSCULAR; INTRAVENOUS; SUBCUTANEOUS
Status: DISCONTINUED | OUTPATIENT
Start: 2018-10-16 | End: 2018-10-16

## 2018-10-16 RX ORDER — GLYCOPYRROLATE 0.2 MG/ML
INJECTION, SOLUTION INTRAMUSCULAR; INTRAVENOUS PRN
Status: DISCONTINUED | OUTPATIENT
Start: 2018-10-16 | End: 2018-10-16

## 2018-10-16 RX ORDER — NEOSTIGMINE METHYLSULFATE 1 MG/ML
VIAL (ML) INJECTION PRN
Status: DISCONTINUED | OUTPATIENT
Start: 2018-10-16 | End: 2018-10-16

## 2018-10-16 RX ORDER — VALACYCLOVIR HYDROCHLORIDE 1 G/1
1000 TABLET, FILM COATED ORAL EVERY 24 HOURS
Status: DISCONTINUED | OUTPATIENT
Start: 2018-10-16 | End: 2018-10-16

## 2018-10-16 RX ORDER — AMOXICILLIN 250 MG
2 CAPSULE ORAL 2 TIMES DAILY
Status: DISCONTINUED | OUTPATIENT
Start: 2018-10-16 | End: 2018-10-19 | Stop reason: HOSPADM

## 2018-10-16 RX ORDER — TIZANIDINE 2 MG/1
2 TABLET ORAL 3 TIMES DAILY
Status: DISCONTINUED | OUTPATIENT
Start: 2018-10-16 | End: 2018-10-19 | Stop reason: HOSPADM

## 2018-10-16 RX ORDER — PROCHLORPERAZINE MALEATE 5 MG
5 TABLET ORAL EVERY 6 HOURS PRN
Status: DISCONTINUED | OUTPATIENT
Start: 2018-10-16 | End: 2018-10-19 | Stop reason: HOSPADM

## 2018-10-16 RX ORDER — HYDROMORPHONE HYDROCHLORIDE 2 MG/1
2 TABLET ORAL
Status: DISCONTINUED | OUTPATIENT
Start: 2018-10-16 | End: 2018-10-19 | Stop reason: HOSPADM

## 2018-10-16 RX ORDER — ONDANSETRON 2 MG/ML
4 INJECTION INTRAMUSCULAR; INTRAVENOUS EVERY 6 HOURS PRN
Status: DISCONTINUED | OUTPATIENT
Start: 2018-10-16 | End: 2018-10-19 | Stop reason: HOSPADM

## 2018-10-16 RX ORDER — ONDANSETRON 4 MG/1
4 TABLET, ORALLY DISINTEGRATING ORAL EVERY 6 HOURS PRN
Status: DISCONTINUED | OUTPATIENT
Start: 2018-10-16 | End: 2018-10-19 | Stop reason: HOSPADM

## 2018-10-16 RX ORDER — FUROSEMIDE 40 MG
40 TABLET ORAL EVERY MORNING
Status: DISCONTINUED | OUTPATIENT
Start: 2018-10-17 | End: 2018-10-16

## 2018-10-16 RX ORDER — HYDROMORPHONE HYDROCHLORIDE 1 MG/ML
0.2 INJECTION, SOLUTION INTRAMUSCULAR; INTRAVENOUS; SUBCUTANEOUS
Status: DISCONTINUED | OUTPATIENT
Start: 2018-10-16 | End: 2018-10-19 | Stop reason: HOSPADM

## 2018-10-16 RX ORDER — ACETAMINOPHEN 325 MG/1
975 TABLET ORAL EVERY 8 HOURS
Status: COMPLETED | OUTPATIENT
Start: 2018-10-16 | End: 2018-10-19

## 2018-10-16 RX ORDER — ALLOPURINOL 100 MG/1
100 TABLET ORAL DAILY
Status: DISCONTINUED | OUTPATIENT
Start: 2018-10-16 | End: 2018-10-19 | Stop reason: HOSPADM

## 2018-10-16 RX ORDER — ONDANSETRON 4 MG/1
4 TABLET, ORALLY DISINTEGRATING ORAL EVERY 30 MIN PRN
Status: DISCONTINUED | OUTPATIENT
Start: 2018-10-16 | End: 2018-10-16 | Stop reason: HOSPADM

## 2018-10-16 RX ORDER — HYDRALAZINE HYDROCHLORIDE 20 MG/ML
10 INJECTION INTRAMUSCULAR; INTRAVENOUS EVERY 4 HOURS PRN
Status: DISCONTINUED | OUTPATIENT
Start: 2018-10-16 | End: 2018-10-19 | Stop reason: HOSPADM

## 2018-10-16 RX ORDER — LIDOCAINE HYDROCHLORIDE 20 MG/ML
INJECTION, SOLUTION INFILTRATION; PERINEURAL PRN
Status: DISCONTINUED | OUTPATIENT
Start: 2018-10-16 | End: 2018-10-16

## 2018-10-16 RX ORDER — NITROGLYCERIN 0.4 MG/1
0.4 TABLET SUBLINGUAL EVERY 5 MIN PRN
Status: DISCONTINUED | OUTPATIENT
Start: 2018-10-16 | End: 2018-10-18

## 2018-10-16 RX ORDER — ACETAMINOPHEN 325 MG/1
650 TABLET ORAL EVERY 4 HOURS PRN
Status: DISCONTINUED | OUTPATIENT
Start: 2018-10-19 | End: 2018-10-19 | Stop reason: HOSPADM

## 2018-10-16 RX ORDER — BISACODYL 10 MG
10 SUPPOSITORY, RECTAL RECTAL DAILY PRN
Status: DISCONTINUED | OUTPATIENT
Start: 2018-10-16 | End: 2018-10-19 | Stop reason: HOSPADM

## 2018-10-16 RX ORDER — CALCITRIOL 0.25 UG/1
0.25 CAPSULE, LIQUID FILLED ORAL EVERY OTHER DAY
Status: DISCONTINUED | OUTPATIENT
Start: 2018-10-17 | End: 2018-10-19 | Stop reason: HOSPADM

## 2018-10-16 RX ORDER — CEFAZOLIN SODIUM 2 G/100ML
2 INJECTION, SOLUTION INTRAVENOUS
Status: COMPLETED | OUTPATIENT
Start: 2018-10-16 | End: 2018-10-16

## 2018-10-16 RX ORDER — AMOXICILLIN 250 MG
2 CAPSULE ORAL 2 TIMES DAILY PRN
Status: DISCONTINUED | OUTPATIENT
Start: 2018-10-16 | End: 2018-10-19 | Stop reason: HOSPADM

## 2018-10-16 RX ORDER — NALOXONE HYDROCHLORIDE 0.4 MG/ML
.1-.4 INJECTION, SOLUTION INTRAMUSCULAR; INTRAVENOUS; SUBCUTANEOUS
Status: DISCONTINUED | OUTPATIENT
Start: 2018-10-16 | End: 2018-10-19 | Stop reason: HOSPADM

## 2018-10-16 RX ORDER — GINSENG 100 MG
CAPSULE ORAL 3 TIMES DAILY
Status: DISCONTINUED | OUTPATIENT
Start: 2018-10-16 | End: 2018-10-19 | Stop reason: HOSPADM

## 2018-10-16 RX ORDER — ONDANSETRON 2 MG/ML
INJECTION INTRAMUSCULAR; INTRAVENOUS PRN
Status: DISCONTINUED | OUTPATIENT
Start: 2018-10-16 | End: 2018-10-16

## 2018-10-16 RX ORDER — FENTANYL CITRATE 50 UG/ML
INJECTION, SOLUTION INTRAMUSCULAR; INTRAVENOUS PRN
Status: DISCONTINUED | OUTPATIENT
Start: 2018-10-16 | End: 2018-10-16

## 2018-10-16 RX ORDER — SODIUM CHLORIDE, SODIUM LACTATE, POTASSIUM CHLORIDE, CALCIUM CHLORIDE 600; 310; 30; 20 MG/100ML; MG/100ML; MG/100ML; MG/100ML
INJECTION, SOLUTION INTRAVENOUS CONTINUOUS
Status: DISCONTINUED | OUTPATIENT
Start: 2018-10-16 | End: 2018-10-16 | Stop reason: HOSPADM

## 2018-10-16 RX ORDER — METOPROLOL SUCCINATE 25 MG/1
25 TABLET, EXTENDED RELEASE ORAL DAILY
Status: DISCONTINUED | OUTPATIENT
Start: 2018-10-17 | End: 2018-10-19 | Stop reason: HOSPADM

## 2018-10-16 RX ORDER — LIDOCAINE 40 MG/G
CREAM TOPICAL
Status: DISCONTINUED | OUTPATIENT
Start: 2018-10-16 | End: 2018-10-19 | Stop reason: HOSPADM

## 2018-10-16 RX ORDER — SPIRONOLACTONE 50 MG/1
50 TABLET, FILM COATED ORAL DAILY
Status: DISCONTINUED | OUTPATIENT
Start: 2018-10-16 | End: 2018-10-16

## 2018-10-16 RX ORDER — FAMCICLOVIR 500 MG/1
500 TABLET ORAL 2 TIMES DAILY
Status: DISCONTINUED | OUTPATIENT
Start: 2018-10-16 | End: 2018-10-16

## 2018-10-16 RX ORDER — ALBUTEROL SULFATE 90 UG/1
2 AEROSOL, METERED RESPIRATORY (INHALATION) EVERY 6 HOURS PRN
Status: DISCONTINUED | OUTPATIENT
Start: 2018-10-16 | End: 2018-10-19 | Stop reason: HOSPADM

## 2018-10-16 RX ORDER — LIDOCAINE 40 MG/G
CREAM TOPICAL
Status: DISCONTINUED | OUTPATIENT
Start: 2018-10-16 | End: 2018-10-16

## 2018-10-16 RX ORDER — SODIUM CHLORIDE 9 MG/ML
INJECTION, SOLUTION INTRAVENOUS CONTINUOUS
Status: DISCONTINUED | OUTPATIENT
Start: 2018-10-16 | End: 2018-10-17

## 2018-10-16 RX ORDER — ONDANSETRON 2 MG/ML
4 INJECTION INTRAMUSCULAR; INTRAVENOUS EVERY 30 MIN PRN
Status: DISCONTINUED | OUTPATIENT
Start: 2018-10-16 | End: 2018-10-16 | Stop reason: HOSPADM

## 2018-10-16 RX ADMIN — PHENYLEPHRINE HYDROCHLORIDE 100 MCG: 10 INJECTION, SOLUTION INTRAMUSCULAR; INTRAVENOUS; SUBCUTANEOUS at 11:41

## 2018-10-16 RX ADMIN — FENTANYL CITRATE 50 MCG: 50 INJECTION, SOLUTION INTRAMUSCULAR; INTRAVENOUS at 11:29

## 2018-10-16 RX ADMIN — PHENYLEPHRINE HYDROCHLORIDE 100 MCG: 10 INJECTION, SOLUTION INTRAMUSCULAR; INTRAVENOUS; SUBCUTANEOUS at 11:59

## 2018-10-16 RX ADMIN — SENNOSIDES AND DOCUSATE SODIUM 2 TABLET: 8.6; 5 TABLET ORAL at 22:29

## 2018-10-16 RX ADMIN — NEOSTIGMINE METHYLSULFATE 4 MG: 1 INJECTION, SOLUTION INTRAVENOUS at 12:16

## 2018-10-16 RX ADMIN — TIZANIDINE 2 MG: 2 TABLET ORAL at 22:28

## 2018-10-16 RX ADMIN — LIDOCAINE HYDROCHLORIDE 80 MG: 20 INJECTION, SOLUTION INFILTRATION; PERINEURAL at 11:06

## 2018-10-16 RX ADMIN — ACETAMINOPHEN 975 MG: 325 TABLET, FILM COATED ORAL at 22:28

## 2018-10-16 RX ADMIN — ROCURONIUM BROMIDE 10 MG: 10 INJECTION INTRAVENOUS at 11:37

## 2018-10-16 RX ADMIN — FENTANYL CITRATE 50 MCG: 50 INJECTION, SOLUTION INTRAMUSCULAR; INTRAVENOUS at 13:25

## 2018-10-16 RX ADMIN — RANITIDINE 150 MG: 150 TABLET ORAL at 14:37

## 2018-10-16 RX ADMIN — HYDROMORPHONE HYDROCHLORIDE 2 MG: 2 TABLET ORAL at 15:13

## 2018-10-16 RX ADMIN — CEFAZOLIN SODIUM 2 G: 2 INJECTION, SOLUTION INTRAVENOUS at 11:08

## 2018-10-16 RX ADMIN — FENTANYL CITRATE 50 MCG: 50 INJECTION, SOLUTION INTRAMUSCULAR; INTRAVENOUS at 12:54

## 2018-10-16 RX ADMIN — ACETAMINOPHEN 975 MG: 325 TABLET, FILM COATED ORAL at 14:36

## 2018-10-16 RX ADMIN — GLYCOPYRROLATE 0.8 MG: 0.2 INJECTION, SOLUTION INTRAMUSCULAR; INTRAVENOUS at 12:16

## 2018-10-16 RX ADMIN — ROCURONIUM BROMIDE 10 MG: 10 INJECTION INTRAVENOUS at 11:22

## 2018-10-16 RX ADMIN — FENTANYL CITRATE 25 MCG: 50 INJECTION, SOLUTION INTRAMUSCULAR; INTRAVENOUS at 13:08

## 2018-10-16 RX ADMIN — FENTANYL CITRATE 25 MCG: 50 INJECTION, SOLUTION INTRAMUSCULAR; INTRAVENOUS at 13:10

## 2018-10-16 RX ADMIN — FENTANYL CITRATE 50 MCG: 50 INJECTION, SOLUTION INTRAMUSCULAR; INTRAVENOUS at 11:17

## 2018-10-16 RX ADMIN — ALLOPURINOL 100 MG: 100 TABLET ORAL at 14:37

## 2018-10-16 RX ADMIN — BACITRACIN: 500 OINTMENT TOPICAL at 16:33

## 2018-10-16 RX ADMIN — Medication: at 12:24

## 2018-10-16 RX ADMIN — FENTANYL CITRATE 50 MCG: 50 INJECTION, SOLUTION INTRAMUSCULAR; INTRAVENOUS at 11:26

## 2018-10-16 RX ADMIN — ROCURONIUM BROMIDE 40 MG: 10 INJECTION INTRAVENOUS at 11:06

## 2018-10-16 RX ADMIN — FENTANYL CITRATE 50 MCG: 50 INJECTION, SOLUTION INTRAMUSCULAR; INTRAVENOUS at 11:06

## 2018-10-16 RX ADMIN — HYDROMORPHONE HYDROCHLORIDE 0.2 MG: 1 INJECTION, SOLUTION INTRAMUSCULAR; INTRAVENOUS; SUBCUTANEOUS at 14:37

## 2018-10-16 RX ADMIN — SIMVASTATIN 40 MG: 40 TABLET, FILM COATED ORAL at 22:29

## 2018-10-16 RX ADMIN — PROPOFOL 150 MG: 10 INJECTION, EMULSION INTRAVENOUS at 11:06

## 2018-10-16 RX ADMIN — SODIUM CHLORIDE: 9 INJECTION, SOLUTION INTRAVENOUS at 14:41

## 2018-10-16 RX ADMIN — TIZANIDINE 2 MG: 2 TABLET ORAL at 15:19

## 2018-10-16 RX ADMIN — PHENYLEPHRINE HYDROCHLORIDE 100 MCG: 10 INJECTION, SOLUTION INTRAMUSCULAR; INTRAVENOUS; SUBCUTANEOUS at 11:37

## 2018-10-16 RX ADMIN — SODIUM CHLORIDE, POTASSIUM CHLORIDE, SODIUM LACTATE AND CALCIUM CHLORIDE: 600; 310; 30; 20 INJECTION, SOLUTION INTRAVENOUS at 09:25

## 2018-10-16 RX ADMIN — DEXAMETHASONE SODIUM PHOSPHATE 4 MG: 4 INJECTION, SOLUTION INTRA-ARTICULAR; INTRALESIONAL; INTRAMUSCULAR; INTRAVENOUS; SOFT TISSUE at 11:38

## 2018-10-16 RX ADMIN — FENTANYL CITRATE 50 MCG: 50 INJECTION, SOLUTION INTRAMUSCULAR; INTRAVENOUS at 12:45

## 2018-10-16 RX ADMIN — FENTANYL CITRATE 50 MCG: 50 INJECTION, SOLUTION INTRAMUSCULAR; INTRAVENOUS at 12:28

## 2018-10-16 RX ADMIN — ONDANSETRON 4 MG: 2 INJECTION INTRAMUSCULAR; INTRAVENOUS at 12:15

## 2018-10-16 RX ADMIN — HYDROMORPHONE HYDROCHLORIDE 2 MG: 2 TABLET ORAL at 18:17

## 2018-10-16 RX ADMIN — FENTANYL CITRATE 50 MCG: 50 INJECTION, SOLUTION INTRAMUSCULAR; INTRAVENOUS at 12:40

## 2018-10-16 ASSESSMENT — ACTIVITIES OF DAILY LIVING (ADL)
ADLS_ACUITY_SCORE: 15
ADLS_ACUITY_SCORE: 15

## 2018-10-16 ASSESSMENT — LIFESTYLE VARIABLES: TOBACCO_USE: 1

## 2018-10-16 ASSESSMENT — COPD QUESTIONNAIRES: COPD: 1

## 2018-10-16 NOTE — ANESTHESIA POSTPROCEDURE EVALUATION
Patient: Marina Harrison    Procedure(s):  LEFT THORACOTOMY, WEDGE RESECTION LEFT UPPER LOBE LUNG NODULE. MEDIASTINAL LYMPH NODE DISSECTION.  - Wound Class: II-Clean Contaminated    Diagnosis:LUNG NODULE   Diagnosis Additional Information: No value filed.    Anesthesia Type:  General, ETT    Note:  Anesthesia Post Evaluation    Patient location during evaluation: PACU  Patient participation: Able to fully participate in evaluation  Level of consciousness: awake  Pain management: adequate  Airway patency: patent  Cardiovascular status: acceptable  Respiratory status: acceptable  Hydration status: acceptable  PONV: none     Anesthetic complications: None          Last vitals:  Vitals:    10/16/18 1320 10/16/18 1330 10/16/18 1340   BP: 147/73 142/67 147/67   Resp: 13 14 16   Temp:  36.2  C (97.2  F)    SpO2: 96% 96% 97%         Electronically Signed By: EDDI LIANG MD  October 16, 2018  2:44 PM

## 2018-10-16 NOTE — BRIEF OP NOTE
[facility] Brief Operative Note    Pre-operative diagnosis: LUNG NODULE    Post-operative diagnosis left upper lobe lung mass     Procedure: Procedure(s):  LEFT THORACOTOMY, WEDGE RESECTION LEFT UPPER LOBE LUNG NODULE. MEDIASTINAL LYMPH NODE DISSECTION.  - Wound Class: II-Clean Contaminated   Surgeon(s): Surgeon(s) and Role:     * Levy Jaquez MD - Primary     * Joanna Lala PA-C - Assisting   Estimated blood loss: 5 mL    Specimens:   ID Type Source Tests Collected by Time Destination   A : LEFT UPPER LOBE LUNG MASS Tissue Lung, Left Upper Lobe SURGICAL PATHOLOGY EXAM Levy Jaquez MD 10/16/2018 11:42 AM    B : SUBAORTIC AP WINDOW LYMPH NODE Tissue Lymph Node SURGICAL PATHOLOGY EXAM Levy Jaquez MD 10/16/2018 11:47 AM       Findings: No pleural fluid nor pleural nodule, await final pathology  CT: one 28 straight to apex

## 2018-10-16 NOTE — PLAN OF CARE
Problem: Chest Tube Drainage Device (Adult)  Goal: Signs and Symptoms of Listed Potential Problems Will be Absent, Minimized or Managed (Chest Tube Drainage Device)  Signs and symptoms of listed potential problems will be absent, minimized or managed by discharge/transition of care (reference Chest Tube Drainage Device (Adult) CPG).  Outcome: Improving  A&O. IMC. Isolation for shingles on r lower back. CMS intact. Bowel sounds active and present. VSS on room. Dressing dried drainage. Up with assist of 1, walkerGIACOMO. C/o back pain, decreased with PO dilaudid. Tolerating regular diet.

## 2018-10-16 NOTE — OP NOTE
Procedure Date: 10/16/2018      SURGEON:  Levy Jaquez MD      FIRST ASSISTANT:  Joanna IBARRA      PREOPERATIVE DIAGNOSIS:  Left upper lung mass.      POSTOPERATIVE DIAGNOSIS:  Left upper lung mass.      PROCEDURE:   1.  Limited left thoracotomy.   2.  Wedge resection, left upper lung mass.   3.  Mediastinal lymph node dissection.      ANESTHESIA:  General with double endotracheal tube.      INDICATIONS:  An 78-year-old woman with a ground-glass opacity in the left upper lobe lung.  She has a history of adenocarcinoma resected from the right lower lobe lung 2 weeks ago.  She recovered well.  Based on the suspicious findings of a separate synchronous primary tumor of the left upper lobe lung, limited thoracotomy resection is indicated for diagnosis and treatment.      DESCRIPTION OF PROCEDURE:  The patient was brought to the OR and placed in the supine position.  Under general anesthesia with endotracheal tube, the patient was placed in a right lateral decubitus position.  Left chest was prepared and draped in the usual fashion using ChloraPrep.  Ventilation of left lung was discontinued.        Limited posterolateral thoracotomy was made sparing the serratus anterior muscle.  Pleural space was entered through the 4th intercostal space, preserving the integrity of the ribs.  Then the lung was examined.  The ground-glass opacity was easily identified.  There were some filmy adhesions which were taken down.  The inferior pulmonary ligament was mobilized.  The hilum was dissected circumferentially.  Using multiple applications of Adena 60 gold stapling device, wedge resection was done with excellent margin.  The staple line was hemostatic.  Frozen section revealed some changes suspicious for lepidic adenocarcinoma.  The hilum was dissected circumferentially.  The only adenopathy was an AP window.  This was excised and submitted for permanent section.  Through a separate stab wound, a 28 straight chest  tube was placed into the apex posteriorly and sutured to the skin with #2 silk suture.  On-Q catheters were placed and 3 mL of Marcaine 0.5% without epinephrine was injected as costal blocks.  Then the thoracotomy was closed with pericostal suture of Vicryl #1 running, #1 Vicryl in muscular layer, running 2-0 Vicryl subcutaneous tissues, skin closed with Insorb staples.      ESTIMATED BLOOD LOSS:  5 mL.        Needle and sponge count is correct.      Joanna Lala PA-C, was the first assistant during the procedure.  Her role as first assistant was essential and necessary in accomplishing the steps of the procedure as described above, providing exposure and retraction.         LAYNE HURD MD             D: 10/16/2018   T: 10/16/2018   MT: [id number]      Name:     ANDRE MAGALLON   MRN:      [id number]        Account:        [id number]   :      1940           Procedure Date: 10/16/2018      Document: [id number]

## 2018-10-16 NOTE — ANESTHESIA PREPROCEDURE EVALUATION
Anesthesia Evaluation     .             ROS/MED HX    ENT/Pulmonary:     (+)tobacco use, Past use COPD, , . .   (-) sleep apnea   Neurologic:       Cardiovascular: Comment: Aorto-fem bypass;    (+) Dyslipidemia, hypertension-Peripheral Vascular Disease-CAD, --. : . CHF etiology: Diastolic heart failure; . . :. .       METS/Exercise Tolerance:     Hematologic:         Musculoskeletal:         GI/Hepatic:        (-) GERD   Renal/Genitourinary:     (+) chronic renal disease, type: CRI,       Endo:     (+) thyroid problem hypothyroidism, .      Psychiatric:         Infectious Disease:         Malignancy:   (+) Malignancy History of Lung  Lung CA status post Surgery.         Other: Comment: Gout;                    Physical Exam  Normal systems: cardiovascular, pulmonary and dental    Airway   Mallampati: II  TM distance: >3 FB  Neck ROM: full    Dental     Cardiovascular       Pulmonary                     Anesthesia Plan      History & Physical Review  History and physical reviewed and following examination; no interval change.    ASA Status:  3 .    NPO Status:  > 8 hours    Plan for General and ETT with Intravenous induction. Maintenance will be Balanced.    PONV prophylaxis:  Ondansetron (or other 5HT-3) and Dexamethasone or Solumedrol  Additional equipment: Double Lumen ETT      Postoperative Care  Postoperative pain management:  IV analgesics.      Consents  Anesthetic plan, risks, benefits and alternatives discussed with:  Patient..                          .

## 2018-10-16 NOTE — IP AVS SNAPSHOT
[facility] [address] [facility]    [address] [place] [address]    Phone:  [phone]                                       After Visit Summary   10/16/2018    Marina Harrison    MRN: [id number]           After Visit Summary Signature Page     I have received my discharge instructions, and my questions have been answered. I have discussed any challenges I see with this plan with the nurse or doctor.    ..........................................................................................................................................  Patient/Patient Representative Signature      ..........................................................................................................................................  Patient Representative Print Name and Relationship to Patient    ..................................................               ................................................  Date                                   Time    ..........................................................................................................................................  Reviewed by Signature/Title    ...................................................              ..............................................  Date                                               Time          22EPIC Rev 08/18

## 2018-10-16 NOTE — ANESTHESIA CARE TRANSFER NOTE
Patient: Marina Harrison    Procedure(s):  LEFT THORACOTOMY, WEDGE RESECTION LEFT UPPER LOBE LUNG NODULE. MEDIASTINAL LYMPH NODE DISSECTION.  - Wound Class: II-Clean Contaminated    Diagnosis: LUNG NODULE   Diagnosis Additional Information: No value filed.    Anesthesia Type:   General, ETT     Note:  Airway :Face Mask  Patient transferred to:PACU  Comments: Neuromuscular blockade reversed after TOF 4/4, spontaneous respirations, adequate tidal volumes, followed commands to voice, oropharynx suctioned with soft flexible catheter, extubated atraumatically, extubated with suction, airway patent after extubation.  Oxygen via facemask at 10 liters per minute to PACU. Oxygen tubing connected to wall O2 in PACU, SpO2, NiBP, and EKG monitors and alarms on and functioning, Safia Hugger warmer connected to patient gown, report on patient's clinical status given to PACU RN, RN questions answered. Handoff Report: Identifed the Patient, Identified the Reponsible Provider, Reviewed the pertinent medical history, Discussed the surgical course, Reviewed Intra-OP anesthesia mangement and issues during anesthesia, Set expectations for post-procedure period and Allowed opportunity for questions and acknowledgement of understanding      Vitals: (Last set prior to Anesthesia Care Transfer)    CRNA VITALS  10/16/2018 1157 - 10/16/2018 1236      10/16/2018             Pulse: 81    SpO2: 96 %    Resp Rate (observed): 17    Resp Rate (set): 10                Electronically Signed By: NILS Sandhu CRNA  October 16, 2018  12:36 PM

## 2018-10-16 NOTE — CONSULTS
Consult Date:  10/16/2018      DATE OF SERVICE:  10/16/2018      REQUESTING PHYSICIAN:  Joanna Lala PA-C     REASON FOR CONSULTATION:  Assist with medical management in the postoperative period.      HISTORY OF PRESENT ILLNESS:  Ms. Harrison is an 78-year-old woman with past medical history including left lung cancer NOS, chronic HFpEF, CKD III with a baseline creatinine 1.8-2, emphysematous COPD status post PFTs on 1/2018 hypothyroidism, peripheral vascular disease, status post aortobifemoral bypass, hypertension, hyperlipidemia, gout periodically requiring steroids, on allopurinol since 08/20/2018 who in an evaluation for right lower extremity and right lower quadrant abdominal pain, underwent CT imaging earlier this summer with incidental finding of a right lower lobe lung nodule.  In 10/02/2018 she had a right lower lobe wedge resection with frozen pathology showing adenocarcinoma.  The patient was discharged home following that surgery, had been home for about a week after after recovering for a bit of time locally with her nieces.  She generally had recovered back to her usual state of health aside from still having some pain requiring opioid analgesics and needing a bowel regimen with Senna.   Also, patient reported recently having had a shingles outbreak on her buttocks and she completed a 7-day course of oral antivirals with last dose being this morning and indicated that all of her vesicles had crusted over.  No other acute issues since her last surgery.  No other medication changes, although she has been off her Plavix for 1 week in anticipation of today's upcoming surgery.      Today, 10/16/18, with the thoracic surgery service, Dr. Jaquez, she underwent a left thoracotomy with left upper lobe wedge resection of a lung nodule and mediastinal lymph node dissection.  Duration of surgery was 1.5 hours.  She received general endotracheal anesthesia.  Blood pressures ranged from 110-200 mmHg.  She  received perioperative Decadron, phenylephrine, Ancef, 600 mL of LR and had an EBL of 5 mL.  She was extubated at the end of her procedure and admitted to station 33 as [facility] status in the postoperative period.  The hospitalist service was asked to see her in consultation to assist with management of her medical comorbid conditions.      PAST MEDICAL HISTORY:    1.  Left-sided lung cancer, further details unknown.   2.  Newly diagnosed right lung lesion, consistent with adenocarcinoma by frozen pathology.   3.  CAD.   4.  HFpEF.   5.  CKD 3.   6.  Emphysematous COPD.  PFTs completed, results unknown.   7.  Hypothyroidism.   8.  Hypertension.   9.  Hyperlipidemia.   10.  Gout.   11.  PVD, status post revascularization with stents and aortobifemoral bypass.      PAST SURGICAL HISTORY:   1.  Status post aortobifemoral bypass.   2.  Appendectomy.   3.  Cholecystectomy.   4.  Bunionectomy.   5.  Bilateral total hip arthroplasty.   6.  Bilateral mastectomy, which was prophylactic.   7.  Laminectomy.   8.  PAULA/BSO.   9.  Chest wall tumor resection.   10.  Goiter removal.   11.  Hemorrhoidectomy.   12.  right lower lobe wedge resection with frozen pathology showing adenocarcinoma     PAST SOCIAL HISTORY:    The patient drinks alcohol once a week.  Former longtime smoker at a max of 2-3 packs a day for at least 30 years.  She receives primary care from Dr. Cody Obando.  She also has a nephrologist.     FAMILY MEDICAL HISTORY:   Sister with breast cancer.  Mother  at age 61 of esophageal cancer.  Sister with lung cancer.  Father with colon cancer.      OUTPATIENT MEDICATIONS:    Prior to Admission Medications   Prescriptions Last Dose Informant Patient Reported? Taking?   Acetaminophen (TYLENOL PO) 10/15/2018 at pm Self Yes Yes   Sig: Take 650 mg by mouth 3 times daily   Carboxymethylcellul-Glycerin (CLEAR EYES FOR DRY EYES) 1-0.25 % SOLN 10/15/2018 at prn Self Yes Yes   Sig: Apply 1 drop to eye daily as needed     EPINEPHrine (EPIPEN) 0.3 MG/0.3ML injection never used at prn Self No Yes   Sig: Inject 0.3 mLs (0.3 mg) into the muscle once as needed for anaphylaxis   FUROSEMIDE PO 10/15/2018 at am Self Yes Yes   Sig: Take 40 mg by mouth every morning (2 x 20 mg = 40 mg dose)   Furosemide (LASIX PO) 10/15/2018 at pm Self Yes Yes   Sig: Take 20 mg by mouth At Bedtime   HYDROmorphone (DILAUDID) 2 MG tablet 10/15/2018 at pm Self No Yes   Sig: Take 0.5-1 tablets (1-2 mg) by mouth every 6 hours as needed for moderate to severe pain (pain control or improvement in physical function. Hold dose for analgesic side effects.)   LEVOTHYROXINE SODIUM PO 10/16/2018 at 0500 Self Yes Yes   Sig: Take 125 mcg by mouth See Admin Instructions 6 times per week   LEVOTHYROXINE SODIUM PO 10/10/2018 Self Yes Yes   Sig: Take 62.5 mcg by mouth once a week (Takes 0.5 x 125mcg tablet = 62.5mcg dose)   On Wednesdays   TiZANidine HCl (ZANAFLEX PO) 10/16/2018 at 0500 Self Yes Yes   Sig: Take 2 mg by mouth 3 times daily    albuterol (PROAIR HFA/PROVENTIL HFA/VENTOLIN HFA) 108 (90 Base) MCG/ACT inhaler more than a week at prn Self No Yes   Sig: Inhale 2 puffs into the lungs every 6 hours as needed for shortness of breath / dyspnea or wheezing   allopurinol (ZYLOPRIM) 100 MG tablet 10/15/2018 at am Self No Yes   Sig: Take 1 tablet (100 mg) by mouth daily   calcitRIOL (ROCALTROL) 0.25 MCG capsule 10/15/2018 at am Self Yes Yes   Sig: Take 1 capsule by mouth every other day.   clopidogrel (PLAVIX) 75 MG tablet more than a week Self No Yes   Sig: TAKE 1 TABLET EVERY DAY   Patient taking differently: Take 75 mg by mouth daily TAKE 1 TABLET EVERY DAY   diclofenac (VOLTAREN) 1 % GEL more than a week at prn Self No Yes   Sig: Apply 2 grams to hands four times daily prn using enclosed dosing card.   Patient taking differently: Apply 2 g topically 4 times daily as needed Apply 2 grams to hands four times daily prn using enclosed dosing card.   famciclovir (FAMVIR) 500  MG tablet 10/16/2018 at 0500 Self No Yes   Sig: TAKE 1 TABLET TWICE DAILY   Patient taking differently: TAKE 1 TABLET TWICE DAILY AS NEEDED FOR SHINGLES   metoprolol succinate (TOPROL-XL) 25 MG 24 hr tablet 10/16/2018 at 0500 Self No Yes   Sig: TAKE 1 TABLET EVERY DAY   Patient taking differently: Take 25 mg by mouth daily TAKE 1 TABLET EVERY DAY   multivitamin  with lutein (OCUVITE WITH LTEIN) CAPS 10/15/2018 at am Self Yes Yes   Sig: Take 1 capsule by mouth daily   predniSONE (DELTASONE) 20 MG tablet more than a week at prn Self No Yes   Sig: TAKE ONE TABLET BY MOUTH TWICE DAILY ONLY WHEN NEEDED FOR GOUT   Patient taking differently: Take by mouth 2 times daily as needed (Gout) TAKE ONE TABLET BY MOUTH TWICE DAILY ONLY WHEN NEEDED FOR GOUT   senna-docusate (SENOKOT-S;PERICOLACE) 8.6-50 MG per tablet 10/15/2018 at pm Self No Yes   Sig: Take 1-2 tablets by mouth 2 times daily as needed for constipation   Patient taking differently: Take 2 tablets by mouth every evening    simvastatin (ZOCOR) 40 MG tablet 10/15/2018 at pm Self No Yes   Sig: TAKE 1 TABLET EVERY DAY   Patient taking differently: Take 40 mg by mouth At Bedtime TAKE 1 TABLET EVERY DAY   spironolactone (ALDACTONE) 50 MG tablet 10/15/2018 at 1200 Self Yes Yes   Sig: Take 1 tablet (50 mg) by mouth daily      Facility-Administered Medications: None       REVIEW OF SYSTEMS:  A 10-point review of systems negative aside from what is described in the HPI.      PHYSICAL EXAMINATION:   GENERAL:  Elderly woman appears younger than stated age, lying in bed in some degree of pain and discomfort.   HEENT:  Head is normocephalic, atraumatic.  Pupils are 3 mm, briskly reactive bilaterally.  Sclerae nonicteric, noninjected.  Mouth has dry oral mucosa.   NECK:  Supple.  There is no supraclavicular lymphadenopathy.   CARDIOVASCULAR:  S1, S2.  No murmurs.  Hypertensive in the 150s range systolic   LUNGS:  Clear to auscultation.  No wheezing. Left mid axillary line has a  chest tube with minimal output to 20 cm of suction and no air leak.  There is no subcutaneous emphysema appreciable.     Incision extends to her left upper back  There is also a pain pump emanating from the lateral aspect of her left posterior back.    ABDOMEN:  Hypoactive bowel sounds, soft, nontender, nondistended.   Abdomen is soft.   EXTREMITIES:  Asymmetric left more than right lower extremity edema which she says is chronic.  It is nonpitting.     CNS:  Face symmetric.  Tongue is midline.  Speech is fluent.  Follows commands, does show 2 fingers and wiggle feet bilaterally.        IMPRESSION:  Ms. Harrison is an 78-year-old woman with past medical history of left lung cancer not otherwise specified with an incidental finding of a right lower lobe lung mass, status post right lower lobe wedge resection on 10/02/2018.  Other medical history includes chronic HFpEF, chronic kidney disease III, emphysematous chronic obstructive pulmonary disease with recent pulmonary function tests 10/1/2018, hypothyroidism, peripheral vascular disease status post aortobifemoral bypass, hypertension, hyperlipidemia, gout who is now status post a left thoracotomy with left upper lobe wedge resection of the lung nodule and mediastinal lymph node dissection on 10/16/2018 with Dr. Jaquez. The hospitalist service was asked to see her in consultation to assist with medical comanagement in the postoperative period.     Problem list & plan  Status post left thoracotomy with left upper lobe wedge resection of a lung nodule, mediastinal lymph node dissection on 10/16/2018.   Emphysematous COPD, PFTs 10/1/2018  -  Per the primary surgical service chest tube management, analgesia, antimicrobials and pulmonary hygiene.     -  Agree with a scheduled bowel regimen while she is on opiate analgesics.   -  Down-titrate FiO2 to keep SpO2 greater than or equal to 90-92%.   -  Continue p.r.n. albuterol.     Hypertension.   Hyperlipidemia.    Peripheral vascular disease, status post aortobifemoral bypass.   CAD   Chronic HFpEF.    -  Holding her diuretics, Lasix and spironolactone, for now, can reevaluate on the a.m. of 10/17/2018 the appropriateness of restarting them with hope that she is off IV fluids at that time.   -  Continue PTA metoprolol with hold parameters.   -  We will add p.r.n. IV hydralazine.  Suspect that her hypertension now is reflective of pain.  Reviewed and discussed with nursing staff ensuring adequate analgesia prior to giving p.r.n. hydralazine.   -  Resume Plavix ASAP when safe to do so from a post-surgical standpoint.   --Continue PTA simvastatin    CKD III, baseline creatinine 1.8-2.  Creatinine is actually better than baseline today.   -  Renally dose all meds, avoid hypotension and nephrotoxins.   -  Strict I's and O's.   -  We will check a BMP in the morning.   -  Continue PTA calcitriol.     Hypothyroidism with normal TSH in 08/2018.   -  Continue PTA levothyroxine.       The patient reports a HISTORY OF OPIATE SENSITIVITY, though she had tolerated oral opioid analgesics well following her most recent surgery.   -  High risk for delirium given now 2 exposures to anesthesia in the last 2 weeks, age, new environment, etc.   -  discontinue the diphenhydramine so as to avoid deliriogenic medications.   --Also use caution with tizanidine    Gout.     -  Continue PTA allopurinol.     Recent shingles activation after her most recent surgery status post 7 days of oral antivirals  -I will discontinue further dosing of acyclovir    Lines   -one18-gauge catheter    Prophylaxis  -enoxaparin apparent as per thoracic surgery next to   --continue PTA H2 blocker         CODE STATUS:  I did discuss code status with the patient.  She desires FULL RESUSCITATIVE MEASURES for a time limited trial.  Does not desire longterm life supporting measures and she has an advance directive indicating such.        We thank you for this interesting  consult.  The patient will be independently evaluated by Dr. Alfred Frost.      Total time is 25 minutes of which 20 minutes was face-to-face time remainder spent in counseling coronation of care        ALFRED FROST MD       As dictated by OLENA AWAN, NILS, CNP            D: 10/16/2018   T: 10/16/2018   MT: HILLARY      Name:     ANDRE MAGALLON   MRN:      [id number]        Account:       [id number]   :      1940           Consult Date:  10/16/2018      Document: [id number]

## 2018-10-16 NOTE — IP AVS SNAPSHOT
MRN:[id number]                      After Visit Summary   10/16/2018    Marina Harrison    MRN: [id number]           Thank you!     Thank you for choosing [facility] for your care. Our goal is always to provide you with excellent care. Hearing back from our patients is one way we can continue to improve our services. Please take a few minutes to complete the written survey that you may receive in the mail after you visit with us. Thank you!        Patient Information     Date Of Birth          1940        Designated Caregiver       Most Recent Value    Caregiver    Name of designated caregiver elfego    Phone number of caregiver     Caregiver address [facility]      About your hospital stay     You were admitted on:  October 16, 2018 You last received care in the:  [place] [address] [facility]    You were discharged on:  October 19, 2018        Reason for your hospital stay       Surgery to remove a lung nodule from the left upper lobe of the lung.  Final pathology showed a small spot of adenocarcinoma.                  Who to Call     For medical emergencies, please call 911.  For non-urgent questions about your medical care, please call your primary care provider or clinic, [phone]  For questions related to your surgery, please call your surgery clinic        Attending Provider     Provider Specialty    Levy Jaquez MD Thoracic Diseases       Primary Care Provider Office Phone # Fax #    Cody Obando [phone]      After Care Instructions     Diet       Follow this diet upon discharge: Orders Placed This Encounter      Advance Diet as Tolerated: Regular Diet Adult                  Follow-up Appointments     Follow-up and recommended labs and tests        1) Remove chest tube dressing on 10/21/18 and then it is Ok to shower.  Please wash both incision and chest tube site daily with soap and water.  You may cover the  chest tube site daily with a clean bandaid if it continues to drain.  2) Steri-strips can be removed in 1 week or they will fall off when they are ready.  3) Continue daily use of your Incentive Spirometer, set of 10x in a row, every 1-2 hours while you are awake during the day.  4) No lifting, pushing or pulling >15 lbs for 2 weeks from the day of your surgery.  No driving while on narcotic pain medications.  5) Follow up with Dr. Jaquez (Thoracic Surgery) in 7-10 days at the [facility] office.  You will need to check in at [facility] for a Chest X-ray prior to that appointment. Please call Bhumika  for Dr. Jaquez at [phone] to schedule this appointment when you get home.                  Further instructions from your care team       Please go to [facility] for a chest x-ray at 1 pm ( check in at 12:50 pm) on Monday, October 29th. [facility] is located in the same building ([facility], [address] [place] [address]) as Dr. Jaquez' office. They are in [address].  Please then go for your post-operative follow-up appointment in Dr. Jaquez' office, on the second floor of the [facility], [address] at 1:20 pm on Monday, October 29th. You will see Joanna Lala PA-C during your appointment.    [facility]  Discharge Orders & Follow-up Care  Video-Assisted Thoracoscopy or Thoracotomy    A. Patient Care:  Call Dr Jaquez  office @ [phone] if you experience:  *Severe chills or a fever or 101 F or higher on two occasions  *Increased incisional pain that cannot be relieved with rest or pain medications  *Presence of unusual incisional or chest tube site drainage that is odorous, green or yellow in color, or if your incision is warm, red or swollen  *Coughing up bright red blood or greenish-yellow secretions  *Chest pain that gets worse with deep breathing or a significant increase in shortness of breath  *Inability to  "urinate or have a bowel movement  *New pain or swelling in your legs    In an emergency, call 911 or have someone drive you to the nearest Emergency Department    Pain Relief:  You may have been given a prescription for narcotic pain medicine.  You may also take ibuprofen and acetaminophen either as a new prescription  or over the counter.  Recommended dosages are:  600 mg Ibuprofen every 6 hours as needed and 650 mg Acetaminophen every 4 hours as needed.  Many patients get good pain relief by \"staggering\" these medications.     Constipation:  Narcotic pain medication, general anesthesia, and time in the hospital with less activity than normal can all cause constipation. Please take a stool softener (what you have at home or one that was prescribed during hospital discharge, such as Senokot-S, docusate sodium, Miralax, Milk of Magnesia) while you are taking narcotics to prevent constipation. Stop taking the stool softener once you are done taking narcotics or if you begin having loose stools/diarrhea. Please call our clinic nurse, Gricel, at [phone] if you are not having success (not having BMs) with your current stool softener.     No driving while on narcotics.     Activity:  ___ No activity restrictions for a scope procedure/thoracoscopy  ___ No heavy lifting greater than 8-10 pounds on the operative side for 4-6 weeks for a thoracotomy    Wound Care:  *You should look at your incision each day and keep it clean while it heals  *Do not apply any creams, salves such as Bacitracin, or ointments on the incision while it is healing  * Steri strips (thin white paper strips) will be present on the incision(s) and they will peel off as your incision heals-- otherwise, they will be removed at your post-op appointment.    *Remove the dressings covering your chest tube site 48 hours after your discharge from the hospital. You may then shower.  Wash the incision and chest tube site(s) daily with soap and water. No " bathing or immersing incision underwater for approximately 2 weeks or until the chest tube sites are completely healed.     Place a dry gauze dressing (and tape) over the chest tube site because it is normal to have some drainage for a few days after the chest tube is removed.  Do not be alarmed if a large amount of fluid drains (should be pink or yellow) either spontaneously or with coughing or exertion. If this happens, just place a larger dry gauze dressing over the chest tube site-- it will stop and scab over in about a week or so. Once the drainage stops, you can stop covering the chest tube site with a dressing. Call our office if the drainage is milky or green in color or foul-smelling.    B. Respiratory:  ___ Utilize Incentive spirometer and flutter valve/acapella (if you received one) 10 times in a row every few hours while awake for a few weeks after discharging home from the hospital    C. Activity:  It may take a few months to regain your normal energy level/stamina. It is important during your recovery to get regular physical activity:  *walk each day at a comfortable pace  *climb stairs as tolerated  *take some rest periods each day but try not to take too many long naps, as this can affect your sleep at night    D. Returning to Work:  Time away from work will depend on your situation. In general, you will need between 1-6 weeks to recover from surgery. Specific dates for returning to work can be discussed at your post-op appointments.       Directions for On-Q Pain Pump Removal:  1. Remove the dressing covering the catheter site.  2. Grasp the catheter close to the skin and gently pull on the catheter. It should be easy to remove and not painful. If it becomes hard to remove or stretches, then stop and call   office.  3. Do not cut or pull hard to remove the catheter.  4. After you remove the catheter, check the catheter tip for a black marking to ensure the entire catheter was removed. Call  "our office if you don't see the black marking.  5. Place a band-aid over the catheter site if needed.  6. Call our office is you have redness, warmth or excessive bleeding from the catheter site or if there is a large bruise or swollen area around the site.    Revised May 2018    Pending Results     No orders found from 10/14/2018 to 10/17/2018.            Statement of Approval     Ordered          10/19/18 0953  I have reviewed and agree with all the recommendations and orders detailed in this document.  EFFECTIVE NOW     Approved and electronically signed by:  Michelle Castro PA-C             Admission Information     Date & Time Provider Department Dept. Phone    10/16/2018 Levy Jaquez MD [place] [address] [facility] [phone]      Your Vitals Were     Blood Pressure Pulse Temperature Respirations Height Weight    100/52 56 98.3  F (36.8  C) (Oral) 16 1.676 m (5' 6\") 87.1 kg (192 lb 0.3 oz)    Pulse Oximetry BMI (Body Mass Index)                91% 30.99 kg/m2          [facility] Information     [facility] gives you secure access to your electronic health record. If you see a primary care provider, you can also send messages to your care team and make appointments. If you have questions, please call your primary care clinic.  If you do not have a primary care provider, please call [phone] and they will assist you.        Care EveryWhere ID     This is your Care EveryWhere ID. This could be used by other organizations to access your [facility] medical records  [id number]        Equal Access to Services     [facility] KANNAN : Hadii aad ku hadasho Soomaali, waaxda luqadaha, qaybta kaalmada adeegyada, aroldo saunders . So [facility] [phone].    ATENCIÓN: Si habla español, tiene a bolaños disposición servicios gratuitos de asistencia lingüística. Llame al [phone].    We comply with applicable federal civil rights laws and [place] laws. We do not discriminate on " the basis of race, color, national origin, age, disability, sex, sexual orientation, or gender identity.               Review of your medicines      CONTINUE these medicines which may have CHANGED, or have new prescriptions. If we are uncertain of the size of tablets/capsules you have at home, strength may be listed as something that might have changed.        Dose / Directions    clopidogrel 75 MG tablet   Commonly known as:  PLAVIX   This may have changed:    - how much to take  - how to take this  - when to take this  - additional instructions   Used for:  Peripheral vascular disease (H)        TAKE 1 TABLET EVERY DAY   Quantity:  90 tablet   Refills:  1       diclofenac 1 % Gel topical gel   Commonly known as:  VOLTAREN   This may have changed:    - how much to take  - how to take this  - when to take this  - reasons to take this  - additional instructions   Used for:  Hand joint pain, unspecified laterality        Apply 2 grams to hands four times daily prn using enclosed dosing card.   Quantity:  100 g   Refills:  5       famciclovir 500 MG tablet   Commonly known as:  FAMVIR   This may have changed:  See the new instructions.   Used for:  Herpes zoster without complication        TAKE 1 TABLET TWICE DAILY   Quantity:  14 tablet   Refills:  3       HYDROmorphone 2 MG tablet   Commonly known as:  DILAUDID   This may have changed:    - how much to take  - reasons to take this   Used for:  Right lower lobe pulmonary nodule, Post-thoracotomy pain        Dose:  2 mg   Take 1 tablet (2 mg) by mouth every 6 hours as needed for moderate to severe pain   Quantity:  30 tablet   Refills:  0       metoprolol succinate 25 MG 24 hr tablet   Commonly known as:  TOPROL-XL   This may have changed:    - how much to take  - how to take this  - when to take this  - additional instructions   Used for:  Hypertension goal BP (blood pressure) < 140/90        TAKE 1 TABLET EVERY DAY   Quantity:  90 tablet   Refills:  3        predniSONE 20 MG tablet   Commonly known as:  DELTASONE   This may have changed:    - how to take this  - when to take this  - reasons to take this  - additional instructions   Used for:  Podagra        TAKE ONE TABLET BY MOUTH TWICE DAILY ONLY WHEN NEEDED FOR GOUT   Quantity:  10 tablet   Refills:  3       senna-docusate 8.6-50 MG per tablet   Commonly known as:  SENOKOT-S;PERICOLACE   This may have changed:    - how much to take  - when to take this  - reasons to take this   Used for:  Right lower lobe pulmonary nodule, Constipation due to opioid therapy        Dose:  1-2 tablet   Take 1-2 tablets by mouth 2 times daily as needed for constipation   Quantity:  30 tablet   Refills:  0       simvastatin 40 MG tablet   Commonly known as:  ZOCOR   This may have changed:    - how much to take  - how to take this  - when to take this  - additional instructions   Used for:  Hyperlipidemia LDL goal <100        TAKE 1 TABLET EVERY DAY   Quantity:  90 tablet   Refills:  3         CONTINUE these medicines which have NOT CHANGED        Dose / Directions    albuterol 108 (90 Base) MCG/ACT inhaler   Commonly known as:  PROAIR HFA/PROVENTIL HFA/VENTOLIN HFA   Used for:  Other emphysema (H)        Dose:  2 puff   Inhale 2 puffs into the lungs every 6 hours as needed for shortness of breath / dyspnea or wheezing   Quantity:  1 Inhaler   Refills:  5       allopurinol 100 MG tablet   Commonly known as:  ZYLOPRIM   Used for:  Podagra, Hyperuricemia        Dose:  100 mg   Take 1 tablet (100 mg) by mouth daily   Quantity:  90 tablet   Refills:  1       calcitRIOL 0.25 MCG capsule   Commonly known as:  ROCALTROL        Dose:  0.25 mcg   Take 1 capsule by mouth every other day.   Quantity:  90 capsule   Refills:  1       CLEAR EYES FOR DRY EYES 1-0.25 % Soln   Generic drug:  Carboxymethylcellul-Glycerin        Dose:  1 drop   Apply 1 drop to eye daily as needed   Refills:  0       EPINEPHrine 0.3 MG/0.3ML injection 2-pack   Commonly known  as:  EPIPEN/ADRENACLICK/or ANY BX GENERIC EQUIV   Used for:  Bee sting reaction, initial encounter        Dose:  0.3 mg   Inject 0.3 mLs (0.3 mg) into the muscle once as needed for anaphylaxis   Quantity:  1 each   Refills:  11       * FUROSEMIDE PO        Dose:  40 mg   Take 40 mg by mouth every morning (2 x 20 mg = 40 mg dose)   Refills:  0       * LASIX PO        Dose:  20 mg   Take 20 mg by mouth At Bedtime   Refills:  0       * LEVOTHYROXINE SODIUM PO        Dose:  125 mcg   Take 125 mcg by mouth See Admin Instructions 6 times per week   Refills:  0       * LEVOTHYROXINE SODIUM PO        Dose:  62.5 mcg   Take 62.5 mcg by mouth once a week (Takes 0.5 x 125mcg tablet = 62.5mcg dose)  On Wednesdays   Refills:  0       multivitamin  with lutein Caps per capsule        Dose:  1 capsule   Take 1 capsule by mouth daily   Refills:  0       spironolactone 50 MG tablet   Commonly known as:  ALDACTONE        Dose:  50 mg   Take 1 tablet (50 mg) by mouth daily   Quantity:  30 tablet   Refills:  1       TYLENOL PO        Dose:  650 mg   Take 650 mg by mouth 3 times daily   Refills:  0       ZANAFLEX PO        Dose:  2 mg   Take 2 mg by mouth 3 times daily   Refills:  0       * Notice:  This list has 4 medication(s) that are the same as other medications prescribed for you. Read the directions carefully, and ask your doctor or other care provider to review them with you.         Where to get your medicines      These medications were sent to [facility] - [address]  [address] [facility] [address], [place] [address]     Phone:  [phone]     senna-docusate 8.6-50 MG per tablet         Some of these will need a paper prescription and others can be bought over the counter. Ask your nurse if you have questions.     Bring a paper prescription for each of these medications     HYDROmorphone 2 MG tablet                Protect others around you: Learn how to safely use, store and throw away your  medicines at www.disposemymeds.org.        Information about OPIOIDS     PRESCRIPTION OPIOIDS: WHAT YOU NEED TO KNOW   We gave you an opioid (narcotic) pain medicine. It is important to manage your pain, but opioids are not always the best choice. You should first try all the other options your care team gave you. Take this medicine for as short a time (and as few doses) as possible.    Some activities can increase your pain, such as bandage changes or therapy sessions. It may help to take your pain medicine 30 to 60 minutes before these activities. Reduce your stress by getting enough sleep, working on hobbies you enjoy and practicing relaxation or meditation. Talk to your care team about ways to manage your pain beyond prescription opioids.    These medicines have risks:    DO NOT drive when on new or higher doses of pain medicine. These medicines can affect your alertness and reaction times, and you could be arrested for driving under the influence (DUI). If you need to use opioids long-term, talk to your care team about driving.    DO NOT operate heavy machinery    DO NOT do any other dangerous activities while taking these medicines.    DO NOT drink any alcohol while taking these medicines.     If the opioid prescribed includes acetaminophen, DO NOT take with any other medicines that contain acetaminophen. Read all labels carefully. Look for the word  acetaminophen  or  Tylenol.  Ask your pharmacist if you have questions or are unsure.    You can get addicted to pain medicines, especially if you have a history of addiction (chemical, alcohol or substance dependence). Talk to your care team about ways to reduce this risk.    All opioids tend to cause constipation. Drink plenty of water and eat foods that have a lot of fiber, such as fruits, vegetables, prune juice, apple juice and high-fiber cereal. Take a laxative (Miralax, milk of magnesia, Colace, Senna) if you don t move your bowels at least every other day.  Other side effects include upset stomach, sleepiness, dizziness, throwing up, tolerance (needing more of the medicine to have the same effect), physical dependence and slowed breathing.    Store your pills in a secure place, locked if possible. We will not replace any lost or stolen medicine. If you don t finish your medicine, please throw away (dispose) as directed by your pharmacist. The [place] Pollution Control Agency has more information about safe disposal: https://www.pca.[facility].[place].us/living-green/managing-unwanted-medications             Medication List: This is a list of all your medications and when to take them. Check marks below indicate your daily home schedule. Keep this list as a reference.      Medications           Morning Afternoon Evening Bedtime As Needed    albuterol 108 (90 Base) MCG/ACT inhaler   Commonly known as:  PROAIR HFA/PROVENTIL HFA/VENTOLIN HFA   Inhale 2 puffs into the lungs every 6 hours as needed for shortness of breath / dyspnea or wheezing                                   allopurinol 100 MG tablet   Commonly known as:  ZYLOPRIM   Take 1 tablet (100 mg) by mouth daily   Last time this was given:  100 mg on 10/16/2018  2:37 PM                                   calcitRIOL 0.25 MCG capsule   Commonly known as:  ROCALTROL   Take 1 capsule by mouth every other day.   Last time this was given:  0.25 mcg on 10/19/2018  8:59 AM   Next Dose Due:  10/21/2018                                   CLEAR EYES FOR DRY EYES 1-0.25 % Soln   Apply 1 drop to eye daily as needed   Generic drug:  Carboxymethylcellul-Glycerin                                   clopidogrel 75 MG tablet   Commonly known as:  PLAVIX   TAKE 1 TABLET EVERY DAY   Next Dose Due:  Resume home schedule                                diclofenac 1 % Gel topical gel   Commonly known as:  VOLTAREN   Apply 2 grams to hands four times daily prn using enclosed dosing card.                                   EPINEPHrine 0.3 MG/0.3ML  injection 2-pack   Commonly known as:  EPIPEN/ADRENACLICK/or ANY BX GENERIC EQUIV   Inject 0.3 mLs (0.3 mg) into the muscle once as needed for anaphylaxis                                   famciclovir 500 MG tablet   Commonly known as:  FAMVIR   TAKE 1 TABLET TWICE DAILY   Next Dose Due:  Resume home schedule                                 * FUROSEMIDE PO   Take 40 mg by mouth every morning (2 x 20 mg = 40 mg dose)   Next Dose Due:  Resume home schedule                                * LASIX PO   Take 20 mg by mouth At Bedtime   Next Dose Due:  Resume home schedule                                HYDROmorphone 2 MG tablet   Commonly known as:  DILAUDID   Take 1 tablet (2 mg) by mouth every 6 hours as needed for moderate to severe pain   Last time this was given:  2 mg on 10/19/2018  9:21 AM                                   * LEVOTHYROXINE SODIUM PO   Take 125 mcg by mouth See Admin Instructions 6 times per week   Last time this was given:  125 mcg on 10/19/2018  9:21 AM                                   * LEVOTHYROXINE SODIUM PO   Take 62.5 mcg by mouth once a week (Takes 0.5 x 125mcg tablet = 62.5mcg dose)  On Wednesdays   Last time this was given:  125 mcg on 10/19/2018  9:21 AM                                   metoprolol succinate 25 MG 24 hr tablet   Commonly known as:  TOPROL-XL   TAKE 1 TABLET EVERY DAY   Last time this was given:  25 mg on 10/18/2018  9:06 AM   Next Dose Due:  10/20/18                                   multivitamin  with lutein Caps per capsule   Take 1 capsule by mouth daily   Next Dose Due:  Resume home schedule                                predniSONE 20 MG tablet   Commonly known as:  DELTASONE   TAKE ONE TABLET BY MOUTH TWICE DAILY ONLY WHEN NEEDED FOR GOUT                                   senna-docusate 8.6-50 MG per tablet   Commonly known as:  SENOKOT-S;PERICOLACE   Take 1-2 tablets by mouth 2 times daily as needed for constipation   Last time this was given:  2 tablets on  10/19/2018  8:59 AM                                   simvastatin 40 MG tablet   Commonly known as:  ZOCOR   TAKE 1 TABLET EVERY DAY   Last time this was given:  40 mg on 10/18/2018 10:06 PM   Next Dose Due:  10/19/18                                   spironolactone 50 MG tablet   Commonly known as:  ALDACTONE   Take 1 tablet (50 mg) by mouth daily   Last time this was given:  25 mg on 10/19/2018  8:59 AM                                   TYLENOL PO   Take 650 mg by mouth 3 times daily   Last time this was given:  975 mg on 10/19/2018  5:32 AM                                         ZANAFLEX PO   Take 2 mg by mouth 3 times daily   Next Dose Due:  Resume home schedule                                 * Notice:  This list has 4 medication(s) that are the same as other medications prescribed for you. Read the directions carefully, and ask your doctor or other care provider to review them with you.

## 2018-10-17 ENCOUNTER — APPOINTMENT (OUTPATIENT)
Dept: GENERAL RADIOLOGY | Facility: CLINIC | Age: 78
DRG: 164 | End: 2018-10-17
Attending: PHYSICIAN ASSISTANT
Payer: MEDICARE

## 2018-10-17 LAB
ANION GAP SERPL CALCULATED.3IONS-SCNC: 7 MMOL/L (ref 3–14)
BUN SERPL-MCNC: 29 MG/DL (ref 7–30)
CALCIUM SERPL-MCNC: 8 MG/DL (ref 8.5–10.1)
CHLORIDE SERPL-SCNC: 103 MMOL/L (ref 94–109)
CO2 SERPL-SCNC: 26 MMOL/L (ref 20–32)
CREAT SERPL-MCNC: 1.64 MG/DL (ref 0.52–1.04)
GFR SERPL CREATININE-BSD FRML MDRD: 30 ML/MIN/1.7M2
GLUCOSE SERPL-MCNC: 113 MG/DL (ref 70–99)
POTASSIUM SERPL-SCNC: 4.4 MMOL/L (ref 3.4–5.3)
SODIUM SERPL-SCNC: 136 MMOL/L (ref 133–144)

## 2018-10-17 PROCEDURE — 99233 SBSQ HOSP IP/OBS HIGH 50: CPT | Performed by: INTERNAL MEDICINE

## 2018-10-17 PROCEDURE — 25000132 ZZH RX MED GY IP 250 OP 250 PS 637: Mod: GY | Performed by: PHYSICIAN ASSISTANT

## 2018-10-17 PROCEDURE — 25000125 ZZHC RX 250: Performed by: PHYSICIAN ASSISTANT

## 2018-10-17 PROCEDURE — 36415 COLL VENOUS BLD VENIPUNCTURE: CPT | Performed by: NURSE PRACTITIONER

## 2018-10-17 PROCEDURE — A9270 NON-COVERED ITEM OR SERVICE: HCPCS | Mod: GY | Performed by: PHYSICIAN ASSISTANT

## 2018-10-17 PROCEDURE — 25000128 H RX IP 250 OP 636: Performed by: PHYSICIAN ASSISTANT

## 2018-10-17 PROCEDURE — 71045 X-RAY EXAM CHEST 1 VIEW: CPT

## 2018-10-17 PROCEDURE — 80048 BASIC METABOLIC PNL TOTAL CA: CPT | Performed by: NURSE PRACTITIONER

## 2018-10-17 PROCEDURE — 12000007 ZZH R&B INTERMEDIATE

## 2018-10-17 RX ORDER — SPIRONOLACTONE 25 MG/1
25 TABLET ORAL DAILY
Status: DISCONTINUED | OUTPATIENT
Start: 2018-10-17 | End: 2018-10-19 | Stop reason: HOSPADM

## 2018-10-17 RX ORDER — CALCIUM CARBONATE 500 MG/1
1000 TABLET, CHEWABLE ORAL
Status: DISCONTINUED | OUTPATIENT
Start: 2018-10-17 | End: 2018-10-19 | Stop reason: HOSPADM

## 2018-10-17 RX ORDER — FUROSEMIDE 20 MG/1
10 TABLET ORAL DAILY
Status: DISCONTINUED | OUTPATIENT
Start: 2018-10-17 | End: 2018-10-19 | Stop reason: HOSPADM

## 2018-10-17 RX ADMIN — SENNOSIDES AND DOCUSATE SODIUM 2 TABLET: 8.6; 5 TABLET ORAL at 08:34

## 2018-10-17 RX ADMIN — CALCITRIOL 0.25 MCG: 0.25 CAPSULE, LIQUID FILLED ORAL at 09:49

## 2018-10-17 RX ADMIN — ENOXAPARIN SODIUM 30 MG: 30 INJECTION, SOLUTION INTRAVENOUS; SUBCUTANEOUS at 11:03

## 2018-10-17 RX ADMIN — TIZANIDINE 2 MG: 2 TABLET ORAL at 08:35

## 2018-10-17 RX ADMIN — SENNOSIDES AND DOCUSATE SODIUM 1 TABLET: 8.6; 5 TABLET ORAL at 21:49

## 2018-10-17 RX ADMIN — TIZANIDINE 2 MG: 2 TABLET ORAL at 21:48

## 2018-10-17 RX ADMIN — ACETAMINOPHEN 975 MG: 325 TABLET, FILM COATED ORAL at 05:19

## 2018-10-17 RX ADMIN — ACETAMINOPHEN 975 MG: 325 TABLET, FILM COATED ORAL at 13:45

## 2018-10-17 RX ADMIN — TIZANIDINE 2 MG: 2 TABLET ORAL at 16:16

## 2018-10-17 RX ADMIN — HYDROMORPHONE HYDROCHLORIDE 2 MG: 2 TABLET ORAL at 21:53

## 2018-10-17 RX ADMIN — BACITRACIN: 500 OINTMENT TOPICAL at 21:49

## 2018-10-17 RX ADMIN — Medication 62.5 MCG: at 09:49

## 2018-10-17 RX ADMIN — RANITIDINE 150 MG: 150 TABLET ORAL at 08:35

## 2018-10-17 RX ADMIN — ACETAMINOPHEN 975 MG: 325 TABLET, FILM COATED ORAL at 21:48

## 2018-10-17 RX ADMIN — SIMVASTATIN 40 MG: 40 TABLET, FILM COATED ORAL at 21:48

## 2018-10-17 ASSESSMENT — ACTIVITIES OF DAILY LIVING (ADL)
ADLS_ACUITY_SCORE: 15

## 2018-10-17 NOTE — PLAN OF CARE
Problem: Patient Care Overview  Goal: Plan of Care/Patient Progress Review  Outcome: No Change  A & Ox4. AVSS. Pain controlled with PO tylenol. Left incision marked with no change in drainage; no crepitus. LS Clear upper; Diminished lower lobes bilaterally. Chest tube to water seal. Tolerating Regular diet. Up with Assist of 1. BS active and audible; passing gas. Voided 200 mls, bladder scanned 37 mls. Encouraged fluids. Continue to monitor.

## 2018-10-17 NOTE — PROGRESS NOTES
[facility]    Hospitalist Progress Note    Date of Service (when I saw the patient): 10/17/2018    Assessment & Plan   78-year-old woman with past medical history of left lung cancer not otherwise specified with an incidental finding of a right lower lobe lung mass, status post right lower lobe wedge resection on 10/02/2018.  Other medical history includes chronic HFpEF, chronic kidney disease III, emphysematous chronic obstructive pulmonary disease with recent pulmonary function tests 10/1/2018, hypothyroidism, peripheral vascular disease status post aortobifemoral bypass, hypertension, hyperlipidemia, gout who is now status post a left thoracotomy with left upper lobe wedge resection of the lung nodule and mediastinal lymph node dissection on 10/16/2018 with Dr. Jaquez. The hospitalist service was asked to see her in consultation to assist with medical comanagement in the postoperative period.        1.) Status post left thoracotomy with left upper lobe wedge resection of a lung nodule, mediastinal lymph node dissection on 10/16/2018.   Emphysematous COPD, PFTs 10/1/2018  -  Per the primary surgical service chest tube management, analgesia, antimicrobials and pulmonary hygiene.     -  Agree with a scheduled bowel regimen while she is on opiate analgesics.   -  Down-titrate FiO2 to keep SpO2 greater than or equal to 90-92%.   -  Continue p.r.n. albuterol.   The patient reports a HISTORY OF OPIATE SENSITIVITY, though she had tolerated oral opioid analgesics well following her most recent surgery.   -  High risk for delirium given now 2 exposures to anesthesia in the last 2 weeks, age, new environment, etc.   -  discontinue the diphenhydramine so as to avoid deliriogenic medications.   --Also use caution with tizanidine     2.) Hypertension.   -  Half dose of PTA lasix and aldactone at this time, IVFs are stopped, BP is soft this AM  -  Added p.r.n. IV hydralazine on 10/16.  Suspect that her  hypertension now is reflective of pain.  Reviewed and discussed with nursing staff ensuring adequate analgesia prior to giving p.r.n. hydralazine.     3.) Hyperlipidemia.   --Continue PTA simvastatin    4.) Peripheral vascular disease, status post aortobifemoral bypass and H/O CAD.   -  Resume Plavix ASAP when safe to do so from a post-surgical standpoint.   - resumed BB on admission     5.) Chronic HFpEF.    - holding diuretics, recommend transition off fluids soon if tolerating diet today   - Continue PTA metoprolol with hold parameters.     6.) CKD III, baseline creatinine 1.8-2.  Creatinine is actually better than baseline today.   -  Renally dose all meds, avoid hypotension and nephrotoxins.   -  Strict I's and O's.   -  BMP reflects Cr of 1.62, stable  -  Continue PTA calcitriol.      7.) Hypothyroidism with normal TSH in 08/2018.   -  Continue PTA levothyroxine.        8.) Gout.     -  Continue PTA allopurinol.      9.) Recent shingles activation after her most recent surgery status post 7 days of oral antivirals  - discontinued further dosing of acyclovir, it appears she had sufficient course as OP after chart review    DVT Prophylaxis: Per Thoracic Surgery  Code Status: Full Code    Disposition: Per Thoracic Surgery, given medical complexity hospitalist will continue to follow along until day of discharge.    Interval History   Doing well, eager to be more active, very pleasant, no new complaints today, says it only hurts when she coughs.  Denies any fever or chills, no acute increase in SOB.      -Data reviewed today: I reviewed all new labs and imaging results over the last 24 hours. I personally reviewed the chest x-ray image(s) showing unchanged lung volumes and RLL opacities suspected atelectasis, nothing new, stable left sided chest tube.    Physical Exam   Temp: 98  F (36.7  C) Temp src: Oral BP: 100/53 Pulse: 63 Heart Rate: 65 Resp: 12 SpO2: 94 % O2 Device: None (Room air) Oxygen Delivery: 3  LP  Vitals:    10/16/18 0845 10/17/18 0600   Weight: 84.4 kg (186 lb) 87.1 kg (192 lb 0.3 oz)     Vital Signs with Ranges  Temp:  [96.6  F (35.9  C)-98.1  F (36.7  C)] 98  F (36.7  C)  Pulse:  [63-68] 63  Heart Rate:  [56-76] 65  Resp:  [9-22] 12  BP: ()/(51-94) 100/53  SpO2:  [91 %-97 %] 94 %  I/O last 3 completed shifts:  In: 1358.1 [P.O.:180; I.V.:1178.1]  Out: 1525 [Urine:1200; Blood:5; Chest Tube:320]    GENERAL:  Elderly woman appears younger than stated age, lying in bed in some degree of pain and discomfort.   HEENT:  Head is normocephalic, atraumatic.  Pupils are 3 mm, briskly reactive bilaterally.  Sclerae nonicteric, noninjected.  Mouth has dry oral mucosa.  There is no supraclavicular lymphadenopathy.   CARDIOVASCULAR:  S1, S2.  No murmurs.  Hypertensive in the 150s range systolic   LUNGS:  Clear to auscultation.  No wheezing. Chest tube in place, incision site C/D/I      ABDOMEN:  Hypoactive bowel sounds, soft, nontender, nondistended.   Abdomen is soft.   EXTREMITIES:  bilateral lower extremity non-pitting edema       Medications     disposable pump w/ anesthetic       - MEDICATION INSTRUCTIONS -       sodium chloride Stopped (10/17/18 0000)       acetaminophen  975 mg Oral Q8H     allopurinol  100 mg Oral Daily     bacitracin   Topical TID     calcitRIOL  0.25 mcg Oral Every Other Day     enoxaparin  30 mg Subcutaneous Q24H     ranitidine  150 mg Oral Daily    Or     famotidine  20 mg Intravenous Daily     levothyroxine (SYNTHROID/LEVOTHROID) half-tab 62.5 mcg  62.5 mcg Oral Weekly     [START ON 10/18/2018] levothyroxine (SYNTHROID/LEVOTHROID) tablet 125 mcg  125 mcg Oral Once per day on Sun Mon Tue Thu Fri Sat     metoprolol succinate  25 mg Oral Daily     senna-docusate  1 tablet Oral BID    Or     senna-docusate  2 tablet Oral BID     simvastatin  40 mg Oral At Bedtime     sodium chloride (PF)  3 mL Intracatheter Q8H     tiZANidine (ZANAFLEX) tablet 2 mg  2 mg Oral TID       Data     Recent  Labs  Lab 10/17/18  0630 10/16/18  0859   WBC  --  8.4   HGB  --  11.2*   MCV  --  96   PLT  --  204   INR  --  1.01    141   POTASSIUM 4.4 4.1   CHLORIDE 103 107   CO2 26 27   BUN 29 26   CR 1.64* 1.67*   ANIONGAP 7 7   OSBALDO 8.0* 8.7   * 105*       Recent Results (from the past 24 hour(s))   XR Chest Port 1 View    Narrative    CHEST ONE VIEW PORTABLE   10/16/2018 12:45 PM     HISTORY: Post op.     COMPARISON: 10/4/2018      Impression    IMPRESSION: Right-sided chest tube has been removed. There is airspace  consolidation at the right lung base, possibly atelectasis or  pneumonia. There is a left-sided chest tube. No pneumothorax or  pleural effusion on the left. Heart size normal.    SHIVANI GEE MD   XR Chest Port 1 View    Narrative    CHEST PORTABLE ONE VIEW   10/17/2018 5:10 AM     HISTORY: Status post left upper lobe wedge resection.     COMPARISON: 10/16/2018      Impression    IMPRESSION: Lung volumes are unchanged. Right lower lobe opacities are  unchanged, presumably atelectasis. No new opacities are identified.  Left chest tube is in stable position with associated trace suspected  pleural gas, unchanged. Heart size is unchanged.    KATHYA MAIN MD

## 2018-10-17 NOTE — PROGRESS NOTES
Care Transition Initial Assessment - RN        Met with: Patient.  DATA   Active Problems:    Mass of upper lobe of left lung       Cognitive Status: awake, alert and oriented.        Contact information and PCP information verified: Yes  Lives With: alone                    Insurance concerns: No Insurance issues identified  ASSESSMENT  Patient currently receives the following services:  none        Identified issues/concerns regarding health management: none, happy about the results of surgery so far  Patient will have her niece with her for a few days upon discharge and then has the assist of her neighbors as needeed.  PLAN  Financial costs for the patient include per insurance .  Patient given options and choices for discharge n/a.  Patient/family is agreeable to the plan?  Yes:   Patient anticipates discharging to home with family and neighbors assist.        Patient anticipates needs for home equipment: No  Plan/Disposition: Home   Appointments:   --  appointment    Care  (CTS) will continue to follow as needed.

## 2018-10-17 NOTE — PROGRESS NOTES
SPIRITUAL HEALTH SERVICES Progress Note  [facility]    Visit per per pt request for , pt was happy with visit from general .  Pt said she is recovering from her second lung surgery in two weeks and is grateful that the surgery was more successful that anticipated.  She said she is recovering comfortably,     provided listening, support and prayer.      Pt said she has support at home from her neighbors, and plans to be transported back home by family.  Pt is looking forward to being home, and anticipating feeling better soon.  She said she is waiting to see if she will need any further treatment.  She said she doesn't want to do more treatment.      I will follow for duration of stay.    Monica Jean Baptiste  Chaplain Resident

## 2018-10-17 NOTE — PROGRESS NOTES
THORACIC SURGERY POD # 1    Doing well  AVSS on RA  No air leak, no bleeding  CXR left lung expanded    Discussed findings and procedure    CT to water seal  Ambulate  resp care ++    Path pending    LAYNE HURD MD [facility] ONCOLOGY THORACIC SURGERY  CELL:  [phone]  OFFICE: [phone]

## 2018-10-17 NOTE — PLAN OF CARE
Problem: Patient Care Overview  Goal: Plan of Care/Patient Progress Review  AVSS. Pain controlled with PO tylenol. Left flank incision marked with no change in drainage; CT with intermittent air leak, no crepitus. LS Clear upper; Diminished lower lobes bilaterally. Tolerating Regular diet. Up with Assist of 1. BS active and audible; passing gas. IMC discontinued at 0600.

## 2018-10-18 ENCOUNTER — APPOINTMENT (OUTPATIENT)
Dept: GENERAL RADIOLOGY | Facility: CLINIC | Age: 78
DRG: 164 | End: 2018-10-18
Attending: PHYSICIAN ASSISTANT
Payer: MEDICARE

## 2018-10-18 LAB
COPATH REPORT: NORMAL
CREAT SERPL-MCNC: 1.94 MG/DL (ref 0.52–1.04)
GFR SERPL CREATININE-BSD FRML MDRD: 25 ML/MIN/1.7M2
PLATELET # BLD AUTO: 162 10E9/L (ref 150–450)

## 2018-10-18 PROCEDURE — 71045 X-RAY EXAM CHEST 1 VIEW: CPT

## 2018-10-18 PROCEDURE — 12000007 ZZH R&B INTERMEDIATE

## 2018-10-18 PROCEDURE — 25000125 ZZHC RX 250: Performed by: PHYSICIAN ASSISTANT

## 2018-10-18 PROCEDURE — A9270 NON-COVERED ITEM OR SERVICE: HCPCS | Mod: GY | Performed by: PHYSICIAN ASSISTANT

## 2018-10-18 PROCEDURE — 25000132 ZZH RX MED GY IP 250 OP 250 PS 637: Mod: GY | Performed by: PHYSICIAN ASSISTANT

## 2018-10-18 PROCEDURE — 99232 SBSQ HOSP IP/OBS MODERATE 35: CPT | Performed by: INTERNAL MEDICINE

## 2018-10-18 PROCEDURE — 25000131 ZZH RX MED GY IP 250 OP 636 PS 637: Mod: GY | Performed by: PHYSICIAN ASSISTANT

## 2018-10-18 PROCEDURE — 36415 COLL VENOUS BLD VENIPUNCTURE: CPT | Performed by: THORACIC SURGERY (CARDIOTHORACIC VASCULAR SURGERY)

## 2018-10-18 PROCEDURE — 25000132 ZZH RX MED GY IP 250 OP 250 PS 637: Mod: GY | Performed by: NURSE PRACTITIONER

## 2018-10-18 PROCEDURE — 25000132 ZZH RX MED GY IP 250 OP 250 PS 637: Mod: GY | Performed by: INTERNAL MEDICINE

## 2018-10-18 PROCEDURE — 82565 ASSAY OF CREATININE: CPT | Performed by: THORACIC SURGERY (CARDIOTHORACIC VASCULAR SURGERY)

## 2018-10-18 PROCEDURE — 85049 AUTOMATED PLATELET COUNT: CPT | Performed by: THORACIC SURGERY (CARDIOTHORACIC VASCULAR SURGERY)

## 2018-10-18 PROCEDURE — A9270 NON-COVERED ITEM OR SERVICE: HCPCS | Mod: GY | Performed by: NURSE PRACTITIONER

## 2018-10-18 PROCEDURE — A9270 NON-COVERED ITEM OR SERVICE: HCPCS | Mod: GY | Performed by: INTERNAL MEDICINE

## 2018-10-18 PROCEDURE — 25000128 H RX IP 250 OP 636: Performed by: PHYSICIAN ASSISTANT

## 2018-10-18 RX ADMIN — HYDROMORPHONE HYDROCHLORIDE 2 MG: 2 TABLET ORAL at 10:06

## 2018-10-18 RX ADMIN — ACETAMINOPHEN 975 MG: 325 TABLET, FILM COATED ORAL at 06:01

## 2018-10-18 RX ADMIN — METOPROLOL SUCCINATE 25 MG: 25 TABLET, EXTENDED RELEASE ORAL at 09:06

## 2018-10-18 RX ADMIN — SENNOSIDES AND DOCUSATE SODIUM 2 TABLET: 8.6; 5 TABLET ORAL at 22:12

## 2018-10-18 RX ADMIN — HYDROMORPHONE HYDROCHLORIDE 2 MG: 2 TABLET ORAL at 16:25

## 2018-10-18 RX ADMIN — HYDROMORPHONE HYDROCHLORIDE 2 MG: 2 TABLET ORAL at 04:32

## 2018-10-18 RX ADMIN — RANITIDINE 150 MG: 150 TABLET ORAL at 09:09

## 2018-10-18 RX ADMIN — HYDROMORPHONE HYDROCHLORIDE 2 MG: 2 TABLET ORAL at 22:12

## 2018-10-18 RX ADMIN — BACITRACIN: 500 OINTMENT TOPICAL at 16:48

## 2018-10-18 RX ADMIN — SENNOSIDES AND DOCUSATE SODIUM 1 TABLET: 8.6; 5 TABLET ORAL at 09:09

## 2018-10-18 RX ADMIN — ACETAMINOPHEN 975 MG: 325 TABLET, FILM COATED ORAL at 22:06

## 2018-10-18 RX ADMIN — LEVOTHYROXINE SODIUM 125 MCG: 125 TABLET ORAL at 09:31

## 2018-10-18 RX ADMIN — CALCIUM CARBONATE (ANTACID) CHEW TAB 500 MG 1000 MG: 500 CHEW TAB at 00:34

## 2018-10-18 RX ADMIN — TIZANIDINE 2 MG: 2 TABLET ORAL at 16:25

## 2018-10-18 RX ADMIN — ONDANSETRON 4 MG: 4 TABLET, ORALLY DISINTEGRATING ORAL at 06:08

## 2018-10-18 RX ADMIN — TIZANIDINE 2 MG: 2 TABLET ORAL at 22:06

## 2018-10-18 RX ADMIN — ACETAMINOPHEN 975 MG: 325 TABLET, FILM COATED ORAL at 14:12

## 2018-10-18 RX ADMIN — SIMVASTATIN 40 MG: 40 TABLET, FILM COATED ORAL at 22:06

## 2018-10-18 RX ADMIN — ENOXAPARIN SODIUM 30 MG: 30 INJECTION, SOLUTION INTRAVENOUS; SUBCUTANEOUS at 10:49

## 2018-10-18 RX ADMIN — CALCIUM CARBONATE (ANTACID) CHEW TAB 500 MG 1000 MG: 500 CHEW TAB at 04:43

## 2018-10-18 RX ADMIN — Medication 10 MG: at 09:07

## 2018-10-18 RX ADMIN — SPIRONOLACTONE 25 MG: 25 TABLET ORAL at 09:06

## 2018-10-18 RX ADMIN — TIZANIDINE 2 MG: 2 TABLET ORAL at 09:09

## 2018-10-18 ASSESSMENT — ACTIVITIES OF DAILY LIVING (ADL)
ADLS_ACUITY_SCORE: 15
ADLS_ACUITY_SCORE: 13
ADLS_ACUITY_SCORE: 15
ADLS_ACUITY_SCORE: 13
ADLS_ACUITY_SCORE: 15
ADLS_ACUITY_SCORE: 13

## 2018-10-18 NOTE — PLAN OF CARE
Problem: Patient Care Overview  Goal: Plan of Care/Patient Progress Review  Outcome: Improving  Alert and oriented x4. Vital signs stable on room air. Standby assist. Tolerating regular diet. Lung sounds clear, diminished in bases. Bowel sounds active, + flatus. Voiding adequately. Left back incision marked, not extended. Chest tube dressing CDI, changed this shift. Chest tube to water seal, no crepitus or air leak. On-Q pump present with clamps open and sensors taped. Pain managed with aqua-K pad and PRN dilaudid.

## 2018-10-18 NOTE — PROGRESS NOTES
[facility]    Hospitalist Progress Note    Date of Service (when I saw the patient): 10/18/2018    Assessment & Plan   78-year-old woman with past medical history of left lung cancer not otherwise specified with an incidental finding of a right lower lobe lung mass, status post right lower lobe wedge resection on 10/02/2018.  Other medical history includes chronic HFpEF, chronic kidney disease III, emphysematous chronic obstructive pulmonary disease with recent pulmonary function tests 10/1/2018, hypothyroidism, peripheral vascular disease status post aortobifemoral bypass, hypertension, hyperlipidemia, gout who is now status post a left thoracotomy with left upper lobe wedge resection of the lung nodule and mediastinal lymph node dissection on 10/16/2018 with Dr. Jaquez. The hospitalist service was asked to see her in consultation to assist with medical comanagement in the postoperative period.        1.) Status post left thoracotomy with left upper lobe wedge resection of a lung nodule, mediastinal lymph node dissection on 10/16/2018.   Emphysematous COPD, PFTs 10/1/2018  -  Per the primary surgical service chest tube management, analgesia, antimicrobials and pulmonary hygiene.     -  Agree with a scheduled bowel regimen while she is on opiate analgesics.   -  has weaned off O2, still has chest tube in  -  Continue p.r.n. albuterol.   The patient reports a HISTORY OF OPIATE SENSITIVITY, though she had tolerated oral opioid analgesics well following her most recent surgery.   -  High risk for delirium given now 2 exposures to anesthesia in the last 2 weeks, age, new environment, etc.   -  discontinue the diphenhydramine so as to avoid deliriogenic medications.   --Also use caution with tizanidine     2.) Hypertension.   -  Half dose of PTA lasix and aldactone at this time, IVFs are stopped on 10/17  -  Added p.r.n. IV hydralazine on 10/16.  Suspect that her hypertension now is reflective of pain.   Reviewed and discussed with nursing staff ensuring adequate analgesia prior to giving p.r.n. hydralazine.     3.) Hyperlipidemia.   --Continue PTA simvastatin    4.) Peripheral vascular disease, status post aortobifemoral bypass and H/O CAD.   -  Resume Plavix on discharge to home (3-5 days post-op)  - resumed BB on admission     5.) Chronic HFpEF.    - holding diuretics, recommend transition off fluids soon if tolerating diet today   - Continue PTA metoprolol with hold parameters.     6.) CKD III, baseline creatinine 1.8-2.  Creatinine is actually better than baseline today.   -  Renally dose all meds, avoid hypotension and nephrotoxins.   -  Strict I's and O's.   -  BMP reflects Cr of 1.62, stable  -  Continue PTA calcitriol.      7.) Hypothyroidism with normal TSH in 08/2018.   -  Continue PTA levothyroxine.        8.) Gout.     -  Continue PTA allopurinol.      9.) Recent shingles activation after her most recent surgery status post 7 days of oral antivirals  - discontinued further dosing of acyclovir, it appears she had sufficient course as OP after chart review  - no lesions at this time, she doesn't need to be in isolation    DVT Prophylaxis: Per Thoracic Surgery  Code Status: Full Code    Disposition: Per Thoracic Surgery.  Given medical complexity hospitalist will continue to follow along until day of discharge.    Interval History   Continues to improve, off oxygen, chest pain is well controlled.  CT is in place but may come out tonight or in AM.  No new complaints.    -Data reviewed today: I reviewed all new labs and imaging results over the last 24 hours. I personally reviewed the chest x-ray image(s) showing unchanged lung volumes and RLL opacities suspected atelectasis, nothing new, stable left sided chest tube.    Physical Exam   Temp: 98.4  F (36.9  C) Temp src: Oral BP: 136/65 Pulse: 59 Heart Rate: 57 Resp: 16 SpO2: 92 % O2 Device: None (Room air)    Vitals:    10/16/18 0845 10/17/18 0600 10/18/18  0601   Weight: 84.4 kg (186 lb) 87.1 kg (192 lb 0.3 oz) 87.1 kg (192 lb 0.3 oz)     Vital Signs with Ranges  Temp:  [97.8  F (36.6  C)-98.4  F (36.9  C)] 98.4  F (36.9  C)  Pulse:  [58-65] 59  Heart Rate:  [56-77] 57  Resp:  [14-16] 16  BP: ()/(51-72) 136/65  SpO2:  [92 %-100 %] 92 %  I/O last 3 completed shifts:  In: 840 [P.O.:840]  Out: 1300 [Urine:1000; Chest Tube:300]    GENERAL:  Elderly woman appears younger than stated age, lying in bed in some degree of pain and discomfort.   HEENT:  Head is normocephalic, atraumatic.  Pupils are 3 mm, briskly reactive bilaterally.  Sclerae nonicteric, noninjected.  Mouth has dry oral mucosa.  There is no supraclavicular lymphadenopathy.   CARDIOVASCULAR:  S1, S2.  No murmurs.  Hypertensive in the 150s range systolic   LUNGS:  Clear to auscultation.  No wheezing. Chest tube in place, incision site C/D/I  ABDOMEN:  Hypoactive bowel sounds, soft, nontender, nondistended.   Abdomen is soft.   EXTREMITIES:  bilateral lower extremity non-pitting edema       Medications     disposable pump w/ anesthetic       - MEDICATION INSTRUCTIONS -         acetaminophen  975 mg Oral Q8H     allopurinol  100 mg Oral Daily     bacitracin   Topical TID     calcitRIOL  0.25 mcg Oral Every Other Day     enoxaparin  30 mg Subcutaneous Q24H     ranitidine  150 mg Oral Daily    Or     famotidine  20 mg Intravenous Daily     furosemide  10 mg Oral Daily     levothyroxine (SYNTHROID/LEVOTHROID) half-tab 62.5 mcg  62.5 mcg Oral Weekly     levothyroxine (SYNTHROID/LEVOTHROID) tablet 125 mcg  125 mcg Oral Once per day on Sun Mon Tue Thu Fri Sat     metoprolol succinate  25 mg Oral Daily     senna-docusate  1 tablet Oral BID    Or     senna-docusate  2 tablet Oral BID     simvastatin  40 mg Oral At Bedtime     sodium chloride (PF)  3 mL Intracatheter Q8H     spironolactone  25 mg Oral Daily     tiZANidine (ZANAFLEX) tablet 2 mg  2 mg Oral TID       Data     Recent Labs  Lab 10/18/18  9014  10/18/18  0025 10/17/18  0630 10/16/18  0859   WBC  --   --   --  8.4   HGB  --   --   --  11.2*   MCV  --   --   --  96     --   --  204   INR  --   --   --  1.01   NA  --   --  136 141   POTASSIUM  --   --  4.4 4.1   CHLORIDE  --   --  103 107   CO2  --   --  26 27   BUN  --   --  29 26   CR  --  1.94* 1.64* 1.67*   ANIONGAP  --   --  7 7   OSBALDO  --   --  8.0* 8.7   GLC  --   --  113* 105*       Recent Results (from the past 24 hour(s))   XR Chest Port 1 View    Narrative    CHEST ONE VIEW PORTABLE October 18, 2018 5:57 AM     HISTORY: Status post left upper lobe wedge resection.     COMPARISON: 10/17/2018.      Impression    IMPRESSION: Left chest tube in good position, no pneumothorax.  Infiltrate at the right base is unchanged. Subcutaneous emphysema  lateral to the lower left chest wall is slightly increased. Borderline  cardiomegaly is unchanged.    KATHYA HORTON MD

## 2018-10-18 NOTE — PROGRESS NOTES
THORACIC SURGERY POD # 2    Doing well  AVSS on RA  No air leak, good cough  Serous CT output  CXR ok    Plan to clamp CT tonight and D/C tomorrow    LAYNE HURD MD [facility] ONCOLOGY THORACIC SURGERY  CELL:  [phone]  OFFICE: [phone]

## 2018-10-18 NOTE — PLAN OF CARE
Problem: Patient Care Overview  Goal: Plan of Care/Patient Progress Review  Outcome: Improving  A+O AVSS on room air. CT to H2O seal, no air leak. Thoracotomy dressing w/ marked drainage. CT dressing CDI, no crepitus. LS clear. BS active, flatus+. Pain controlled w/ prn dilaudid. Up SBA. Onq infusing.

## 2018-10-18 NOTE — PLAN OF CARE
Problem: Patient Care Overview  Goal: Plan of Care/Patient Progress Review  Pt a/o x 4. Soft BP otherwise VSS. Pain well controlled with scheduled tylenol, prn dilaudid Chest tubes in place, on water seal. No air leak, no crepitous. On q device in place. Tolerating regular diet, up with SBA. Had a student walked frequently in the hallway.

## 2018-10-19 ENCOUNTER — APPOINTMENT (OUTPATIENT)
Dept: GENERAL RADIOLOGY | Facility: CLINIC | Age: 78
DRG: 164 | End: 2018-10-19
Attending: PHYSICIAN ASSISTANT
Payer: MEDICARE

## 2018-10-19 VITALS
HEIGHT: 66 IN | RESPIRATION RATE: 16 BRPM | WEIGHT: 192.02 LBS | SYSTOLIC BLOOD PRESSURE: 100 MMHG | HEART RATE: 56 BPM | OXYGEN SATURATION: 91 % | DIASTOLIC BLOOD PRESSURE: 52 MMHG | TEMPERATURE: 98.3 F | BODY MASS INDEX: 30.86 KG/M2

## 2018-10-19 PROCEDURE — 71045 X-RAY EXAM CHEST 1 VIEW: CPT

## 2018-10-19 PROCEDURE — 25000132 ZZH RX MED GY IP 250 OP 250 PS 637: Mod: GY | Performed by: INTERNAL MEDICINE

## 2018-10-19 PROCEDURE — 25000132 ZZH RX MED GY IP 250 OP 250 PS 637: Mod: GY | Performed by: PHYSICIAN ASSISTANT

## 2018-10-19 PROCEDURE — A9270 NON-COVERED ITEM OR SERVICE: HCPCS | Mod: GY | Performed by: PHYSICIAN ASSISTANT

## 2018-10-19 PROCEDURE — 25000128 H RX IP 250 OP 636: Performed by: PHYSICIAN ASSISTANT

## 2018-10-19 PROCEDURE — A9270 NON-COVERED ITEM OR SERVICE: HCPCS | Mod: GY | Performed by: INTERNAL MEDICINE

## 2018-10-19 RX ORDER — HYDROMORPHONE HYDROCHLORIDE 2 MG/1
2 TABLET ORAL EVERY 6 HOURS PRN
Qty: 30 TABLET | Refills: 0 | Status: SHIPPED | OUTPATIENT
Start: 2018-10-19 | End: 2018-10-29

## 2018-10-19 RX ORDER — AMOXICILLIN 250 MG
1-2 CAPSULE ORAL 2 TIMES DAILY PRN
Qty: 30 TABLET | Refills: 0 | Status: SHIPPED | OUTPATIENT
Start: 2018-10-19

## 2018-10-19 RX ADMIN — BISACODYL 10 MG: 10 SUPPOSITORY RECTAL at 11:22

## 2018-10-19 RX ADMIN — LEVOTHYROXINE SODIUM 125 MCG: 125 TABLET ORAL at 09:21

## 2018-10-19 RX ADMIN — CALCITRIOL 0.25 MCG: 0.25 CAPSULE, LIQUID FILLED ORAL at 08:59

## 2018-10-19 RX ADMIN — RANITIDINE 150 MG: 150 TABLET ORAL at 08:59

## 2018-10-19 RX ADMIN — SENNOSIDES AND DOCUSATE SODIUM 2 TABLET: 8.6; 5 TABLET ORAL at 08:59

## 2018-10-19 RX ADMIN — HYDROMORPHONE HYDROCHLORIDE 2 MG: 2 TABLET ORAL at 04:50

## 2018-10-19 RX ADMIN — SPIRONOLACTONE 25 MG: 25 TABLET ORAL at 08:59

## 2018-10-19 RX ADMIN — POLYETHYLENE GLYCOL 3350 17 G: 17 POWDER, FOR SOLUTION ORAL at 12:30

## 2018-10-19 RX ADMIN — BACITRACIN: 500 OINTMENT TOPICAL at 09:56

## 2018-10-19 RX ADMIN — Medication 10 MG: at 08:59

## 2018-10-19 RX ADMIN — ACETAMINOPHEN 975 MG: 325 TABLET, FILM COATED ORAL at 05:32

## 2018-10-19 RX ADMIN — CALCIUM CARBONATE (ANTACID) CHEW TAB 500 MG 1000 MG: 500 CHEW TAB at 10:01

## 2018-10-19 RX ADMIN — TIZANIDINE 2 MG: 2 TABLET ORAL at 08:59

## 2018-10-19 RX ADMIN — HYDROMORPHONE HYDROCHLORIDE 2 MG: 2 TABLET ORAL at 09:21

## 2018-10-19 RX ADMIN — ENOXAPARIN SODIUM 30 MG: 30 INJECTION, SOLUTION INTRAVENOUS; SUBCUTANEOUS at 10:01

## 2018-10-19 ASSESSMENT — ACTIVITIES OF DAILY LIVING (ADL)
ADLS_ACUITY_SCORE: 13
ADLS_ACUITY_SCORE: 9
ADLS_ACUITY_SCORE: 13
ADLS_ACUITY_SCORE: 13

## 2018-10-19 NOTE — DISCHARGE SUMMARY
THORACIC SURGERY HOSPITAL DISCHARGE SUMMARY  [facility] - ANAIS JONES    [facility]  [address], [address]  [place] [address] [phone]  [email or web address]    10/19/2018     Cody Obando   [address] / [place] [address]  Phone: [phone]   Fax: [phone]        Re: Marina Harrison             1940             [id number]              Dates of Hospitalization: 10/16/2018 -     Dear Dr. Obando:     As you are aware, we had the pleasure of caring for your patient, Ms. Harrison here at [facility].  She is a pleasant 78 year old female who was admitted for the treatment of an indeterminate but suspicious lung nodule in the left upper lobe of the lung. Pre-op work-up revealed a ground-glass opacity in the left upper lobe lung.  She has a history of adenocarcinoma resected from the right lower lobe lung 2 weeks ago and recovered well.  Based on the suspicious findings of a separate synchronous primary tumor of the left upper lobe lung, limited thoracotomy resection is indicated for diagnosis and treatment.       On 10/16/2018, Dr. Jaquez performed:  1.  Limited left thoracotomy.   2.  Wedge resection, left upper lung mass.   3.  Mediastinal lymph node dissection.     Final Surgical Pathology Revealed:  FINAL DIAGNOSIS:   A. Lung, left upper lobe: Wedge resection:   - Invasive well-differentiated adenocarcinoma, lepidic predominant with   acinar pattern (60% lepidic, 40%   acinar), 1.0 cm invasive size   - Adenocarcinoma in situ, 0.6 cm   - No lymphvascular invasion present   - No pleural invasion identified   - Resection margins negative for tumor   - See synoptic report for details     B. Lymph node, subaortic AP window: Excision:   - Lymph node with hyalinized and calcified granuloma, negative for   metastatic carcinoma   - AFB and GMS stains negative for acid fast bacteria and fungal organisms     Final 2018 Stage:  "[id number]-->Stage IA    Her post-operative course was remarkable for some persistent post-op pain. She had a dry cough that aggrevated the pain a bit but overall we were able to control with pain with PO Dilaudid and she will discharge to home on the same. She tolerated a trial of chest tube clamping overnight POD#2-3 and her chest tube was able to be removed this AM without incident.  She is moving well and will have the assistance of her niece and some friends during her recover period at home.    Ms. Harrison has otherwise recovered sufficiently to be discharged to home today, 10/19/2018, on post-operative day number 3 for further convalescence.  Her incisions are healing well with no signs or symptoms of infection.  Her bowels have not yet moved, however, we will given her a suppository before discharge to promote return of bowel function and she is tolerating diet and activity, ambulating and transferring independently.  She is currently afebrile with stable vital signs as below. /64 (BP Location: Right arm)  Pulse 59  Temp 98.3  F (36.8  C) (Oral)  Resp 16  Ht 1.676 m (5' 6\")  Wt 87.1 kg (192 lb 0.3 oz)  SpO2 93%  BMI 30.99 kg/m2 on room air.    Below, you will find a full discharge medication list and instructions.  We will make arrangements for Ms. Harrison to follow-up with us in our [facility] in 1-2 weeks with a Chest X-ray prior to that appointment.  We certainly thank you for allowing us to participate in the care of Ms. Harrison here at [facility].  Please feel free to contact our office at [phone] with any questions or concerns or if we can be of any further assistance in the care of this patient.    Sincerely,    Dr. Levy Jaquez MD    D/C Summary Prepared by: Michelle Castro PA-C  Discharge Medications:      Past Medical History:  Past Medical History:   Diagnosis Date     Arthritis      CAD (coronary artery disease)      Cataract      Chronic " diastolic heart failure (H)      Chronic kidney disease, unspecified      COPD (chronic obstructive pulmonary disease) (H)      Emphysema of lung (H)      Hyperlipidemia      Hypertension      Lung cancer (H)      PVD (peripheral vascular disease) (H)       Past Surgical History:   Procedure Laterality Date     AORTO BI ILIAC BYPASS Bilateral 1999     APPENDECTOMY       BUNIONECTOMY       CHOLECYSTECTOMY       ENT SURGERY      sinus surgery     EYE SURGERY Right     cataract     GYN SURGERY      D & C     HEMORRHOIDECTOMY       HYSTERECTOMY, PAP NO LONGER INDICATED  1976    no oophorectomy     JOINT REPLACEMENT, HIP RT/LT Bilateral 2005    Joint Replacement Hip RT/LT     MASTECTOMY, SIMPLE RT/LT/DARLENE Bilateral 1974    fibrocystic disease     ORTHOPEDIC SURGERY  1991    repair fracture of left leg     SPINE SURGERY  1972,1982     THORACOSCOPIC WEDGE RESECTION LUNG Right 10/2/2018    Procedure: THORACOSCOPIC WEDGE RESECTION LUNG;  RIGHT VIDEO ASSISTED THORACOSCOPY WEDGE RESECTION RIGHT, LOWER LOBE LUNG NODULE, LIMITED THORACOTOMY;  Surgeon: Levy Jaquez MD;  Location: [facility] OR     THORACOTOMY, WEDGE RESECTION LUNG, COMBINED Left 10/16/2018    Procedure: LEFT THORACOTOMY, WEDGE RESECTION LEFT UPPER LOBE LUNG NODULE. MEDIASTINAL LYMPH NODE DISSECTION. ;  Surgeon: Levy Jaquez MD;  Location: [facility] OR     THYROIDECTOMY       goiter        Discharge Instructions:  1) Remove chest tube dressing on 10/21/18 and then it is Ok to shower.  Please wash both incision and chest tube site daily with soap and water.  2 sutures will be present at the chest tube site, it is Ok to get these wet and they will be removed at the time of your follow-up visit.  You may cover the chest tube site daily with a clean bandaid if it continues to drain.  2) Steri-strips can be removed in 1 week or they will fall off when they are ready.  3) Continue daily use of your Incentive Spirometer, set of 10x in a row, every 1-2 hours while  you are awake during the day.  4) No lifting, pushing or pulling >15 lbs for 2 weeks from the day of your surgery.  No driving while on narcotic pain medications.    Follow-Up Care:  1) Follow up with Dr. Jaquez (Thoracic Surgery) in 7-10 days at the [facility] [place] office.  You will need to check in at [facility] for a Chest X-ray prior to that appointment. Please call Bhumika  for Dr. Jaquez at [phone] to schedule this appointment when you get home.     2) Follow up with Primary Care Provider, Cody Obando within 1 month of discharge for routine post-surgical care, wound check and follow up.  Please call [phone] to arrange this appointment.     CC  Patient Care Team:  Cody Obando MD as PCP - General (Internal Medicine)

## 2018-10-19 NOTE — PLAN OF CARE
Problem: Patient Care Overview  Goal: Plan of Care/Patient Progress Review  Outcome: Improving  VSS, A&O x4, CT out this AM- pt tolerated well, site is covered, dressing is CDI. Incision near R scapula is BLADIMIR, covered with steri-strips, no drainage noted, is CDI. Pt uses PRN Dilaudid for pain control, LS clear and diminished, BS+, able to pass gas, voiding without difficulty, is SBA, ambulates with walker. Appetite is good, able to feed self. Discharge instructions and medications reviewed and given to pt, pt verbalized understanding. Pt's niece to to drive pt home.

## 2018-10-19 NOTE — PLAN OF CARE
Problem: Patient Care Overview  Goal: Plan of Care/Patient Progress Review  Outcome: Improving  A+O soft BP, other VSS. CT unclamped after morning CXR. No crepitus or air leak. LS clear. BS active, flatus+. Voiding adequately. Up SBA. Pain controlled w/ prn dilaudid. onq infusing.

## 2018-10-19 NOTE — PROGRESS NOTES
"Thoracic Surgery Progress Note:    Marina Harrison   1940  [id number]    POD: 3    Procedure: 1.  Limited left thoracotomy.   2.  Wedge resection, left upper lung mass.   3.  Mediastinal lymph node dissection.     Surgeon: Surgeon(s):  Levy Jaquez, Joanna Becerra PA-C    S: Pt sitting in the chair. Having a lot of pain today. Resp status stable through clamp trial last night.  Taking PO dilaudid with some relief but had some coughing this AM which was making her hurt more.  Using IS. Wants to go home. Moving well. Will have her niece to stay with her and help after discharge.     O: /64 (BP Location: Right arm)  Pulse 59  Temp 98.3  F (36.8  C) (Oral)  Resp 16  Ht 1.676 m (5' 6\")  Wt 87.1 kg (192 lb 0.3 oz)  SpO2 93%  BMI 30.99 kg/m2 on room air.    Physical Exam:  General: Awake, alert, asking good questions.  Pulmonary: Moving air well, cough mostly dry, irritative.  IS 1250 max.  Abdomen: BS present. Soft, non-tender.  No BM.  Thoracotomy Incision: Healing well, non-erythematous.  Steri-strips in place.   Chest tube site: Examined. No sx of infection. Re-covered after chest tube removal.   Right Thoracotomy and chest tube sites are well healed with minimal scabs remaining after prior surgery.  Extremities: Normal, no edema.    CXR 10/19/18: Personally reviewed, small apical space is present but stable throughout clamp trial, likely volume loss after wedge resection.    Chest tube clamped overnight, 80 cc serosang drainage overnight, 60 cc today, no airleak seen with cough after clamp removed.    A: Left Upper Lobe Lung Cancer, [id number]--> Stage IA.    P:   Chest Tube: Chest tube was removed per usual protocol. Occlusive dressing applied. Pt tolerated well. Can remove dressing in 2 days and then pt may shower.  Dx Lung nodule s/p ENOCH Wedge Resection: Final pathology confirms adenocarcinoma with clear margins. No further tx needed.  Will arrange for ongoing surveillance " after discharge with 1st CT scan due in 6 months.    Activity: Ambulate as tolerated. Lifting restrictions reviewed.  Pain: PO dilaudid PRN for pain. Rx provided for discharge.  Bowels: Senna-S BID.  Will give a suppository to ensure return of bowel function today prior to discharge.  Respiratory: Encourage increased IS use.     Disposition: Ok for discharge to home today from Thoracic Surgery perspective. discharge instructions reviewed including activity restrictions, pain management and wound care.  Pt should follow-up with Dr. Jaquez in 7-10 days. She will call our office to schedule and was encouraged to call if further questions in the meantime.    Michelle Castro PA-C with Dr. Hank Hubbard MD and Dr. Levy Jaquez MD  [facility] - Thoracic Surgery  Cell/Pager: [phone]

## 2018-10-19 NOTE — DISCHARGE INSTRUCTIONS
"Please go to [facility] for a chest x-ray at 1 pm ( check in at 12:50 pm) on Monday, October 29th. [facility] is located in the same building ([facility], [address] [place] [address]) as Dr. Jaquez' office. They are in [address].  Please then go for your post-operative follow-up appointment in Dr. Jaquez' office, on the second floor of the [facility], [address] at 1:20 pm on Monday, October 29th. You will see Joanna Lala PA-C during your appointment.    [facility]  Discharge Orders & Follow-up Care  Video-Assisted Thoracoscopy or Thoracotomy    A. Patient Care:  Call Dr Jaquez  office @ [phone] if you experience:  *Severe chills or a fever or 101 F or higher on two occasions  *Increased incisional pain that cannot be relieved with rest or pain medications  *Presence of unusual incisional or chest tube site drainage that is odorous, green or yellow in color, or if your incision is warm, red or swollen  *Coughing up bright red blood or greenish-yellow secretions  *Chest pain that gets worse with deep breathing or a significant increase in shortness of breath  *Inability to urinate or have a bowel movement  *New pain or swelling in your legs    In an emergency, call 911 or have someone drive you to the nearest Emergency Department    Pain Relief:  You may have been given a prescription for narcotic pain medicine.  You may also take ibuprofen and acetaminophen either as a new prescription  or over the counter.  Recommended dosages are:  600 mg Ibuprofen every 6 hours as needed and 650 mg Acetaminophen every 4 hours as needed.  Many patients get good pain relief by \"staggering\" these medications.     Constipation:  Narcotic pain medication, general anesthesia, and time in the hospital with less activity than normal can all cause constipation. Please take a stool softener (what you have at home or one that was prescribed during hospital " discharge, such as Senokot-S, docusate sodium, Miralax, Milk of Magnesia) while you are taking narcotics to prevent constipation. Stop taking the stool softener once you are done taking narcotics or if you begin having loose stools/diarrhea. Please call our clinic nurse, Gricel, at [phone] if you are not having success (not having BMs) with your current stool softener.     No driving while on narcotics.     Activity:  ___ No activity restrictions for a scope procedure/thoracoscopy  ___ No heavy lifting greater than 8-10 pounds on the operative side for 4-6 weeks for a thoracotomy    Wound Care:  *You should look at your incision each day and keep it clean while it heals  *Do not apply any creams, salves such as Bacitracin, or ointments on the incision while it is healing  * Steri strips (thin white paper strips) will be present on the incision(s) and they will peel off as your incision heals-- otherwise, they will be removed at your post-op appointment.    *Remove the dressings covering your chest tube site 48 hours after your discharge from the hospital. You may then shower.  Wash the incision and chest tube site(s) daily with soap and water. No bathing or immersing incision underwater for approximately 2 weeks or until the chest tube sites are completely healed.     Place a dry gauze dressing (and tape) over the chest tube site because it is normal to have some drainage for a few days after the chest tube is removed.  Do not be alarmed if a large amount of fluid drains (should be pink or yellow) either spontaneously or with coughing or exertion. If this happens, just place a larger dry gauze dressing over the chest tube site-- it will stop and scab over in about a week or so. Once the drainage stops, you can stop covering the chest tube site with a dressing. Call our office if the drainage is milky or green in color or foul-smelling.    B. Respiratory:  ___ Utilize Incentive spirometer and flutter  valve/acapella (if you received one) 10 times in a row every few hours while awake for a few weeks after discharging home from the hospital    C. Activity:  It may take a few months to regain your normal energy level/stamina. It is important during your recovery to get regular physical activity:  *walk each day at a comfortable pace  *climb stairs as tolerated  *take some rest periods each day but try not to take too many long naps, as this can affect your sleep at night    D. Returning to Work:  Time away from work will depend on your situation. In general, you will need between 1-6 weeks to recover from surgery. Specific dates for returning to work can be discussed at your post-op appointments.       Directions for On-Q Pain Pump Removal:  1. Remove the dressing covering the catheter site.  2. Grasp the catheter close to the skin and gently pull on the catheter. It should be easy to remove and not painful. If it becomes hard to remove or stretches, then stop and call   office.  3. Do not cut or pull hard to remove the catheter.  4. After you remove the catheter, check the catheter tip for a black marking to ensure the entire catheter was removed. Call our office if you don't see the black marking.  5. Place a band-aid over the catheter site if needed.  6. Call our office is you have redness, warmth or excessive bleeding from the catheter site or if there is a large bruise or swollen area around the site.    Revised May 2018

## 2018-10-22 ENCOUNTER — TELEPHONE (OUTPATIENT)
Dept: FAMILY MEDICINE | Facility: CLINIC | Age: 78
End: 2018-10-22

## 2018-10-22 NOTE — TELEPHONE ENCOUNTER
"Hospital/TCU/ED for chronic condition Discharge Protocol    \"Hi, my name is Nat Slater, a registered nurse, and I am calling from [facility].  I am calling to follow up and see how things are going for you after your recent emergency visit/hospital/TCU stay.\"    Tell me how you are doing now that you are home?\" I am surviving       Discharge Instructions    \"Let's review your discharge instructions.  What is/are the follow-up recommendations?  Pt. Response: follow up PCP     \"Has an appointment with your primary care provider been scheduled?\"   No (schedule appointment)    \"When you see the provider, I would recommend that you bring your medications with you.\"    Medications    \"Tell me what changed about your medicines when you discharged?\"    Changes to chronic meds?    0-1    \"What questions do you have about your medications?\"    None     New diagnoses of heart failure, COPD, diabetes, or MI?    No              Medication reconciliation completed? Yes  Was MTM referral placed (*Make sure to put transitions as reason for referral)?   No    Call Summary    \"What questions or concerns do you have about your recent visit and your follow-up care?\"     none    \"If you have questions or things don't continue to improve, we encourage you contact us through the main clinic number (give number).  Even if the clinic is not open, triage nurses are available 24/7 to help you.     We would like you to know that our clinic has extended hours (provide information).  We also have urgent care (provide details on closest location and hours/contact info)\"      \"Thank you for your time and take care!\"             "

## 2018-10-29 ENCOUNTER — TRANSFERRED RECORDS (OUTPATIENT)
Dept: HEALTH INFORMATION MANAGEMENT | Facility: CLINIC | Age: 78
End: 2018-10-29

## 2018-10-29 ENCOUNTER — OFFICE VISIT (OUTPATIENT)
Dept: FAMILY MEDICINE | Facility: CLINIC | Age: 78
End: 2018-10-29
Payer: MEDICARE

## 2018-10-29 VITALS
BODY MASS INDEX: 28.93 KG/M2 | HEIGHT: 66 IN | HEART RATE: 79 BPM | TEMPERATURE: 97.5 F | OXYGEN SATURATION: 99 % | DIASTOLIC BLOOD PRESSURE: 78 MMHG | RESPIRATION RATE: 22 BRPM | SYSTOLIC BLOOD PRESSURE: 130 MMHG | WEIGHT: 180 LBS

## 2018-10-29 DIAGNOSIS — E79.0 HYPERURICEMIA: ICD-10-CM

## 2018-10-29 DIAGNOSIS — G89.12 POST-THORACOTOMY PAIN: Primary | ICD-10-CM

## 2018-10-29 DIAGNOSIS — M10.9 PODAGRA: ICD-10-CM

## 2018-10-29 PROCEDURE — 99213 OFFICE O/P EST LOW 20 MIN: CPT | Performed by: INTERNAL MEDICINE

## 2018-10-29 PROCEDURE — 84550 ASSAY OF BLOOD/URIC ACID: CPT | Performed by: INTERNAL MEDICINE

## 2018-10-29 PROCEDURE — 36415 COLL VENOUS BLD VENIPUNCTURE: CPT | Performed by: INTERNAL MEDICINE

## 2018-10-29 RX ORDER — HYDROMORPHONE HYDROCHLORIDE 2 MG/1
2 TABLET ORAL EVERY 6 HOURS PRN
Qty: 30 TABLET | Refills: 0 | Status: SHIPPED | OUTPATIENT
Start: 2018-10-29 | End: 2019-01-29

## 2018-10-29 NOTE — PATIENT INSTRUCTIONS
I will let you know your lab results,and whether you need to change the uric acid dose.

## 2018-10-29 NOTE — PROGRESS NOTES
SUBJECTIVE:   Marina Harrison is a 78 year old female who presents to clinic today for the following health issues:          Hospital Follow-up Visit:    Hospital/Nursing Home/IP Rehab Facility: [facility]  Date of Admission: 9/16/18  Date of Discharge: 9/19/18  Reason(s) for Admission: 1.  Limited left thoracotomy.   2.  Wedge resection, left upper lung mass.   3.  Mediastinal lymph node dissection.             Problems taking medications regularly:  None       Medication changes since discharge: see med list       Problems adhering to non-medication therapy:  None    Summary of hospitalization:  [facility] discharge summary reviewed  Diagnostic Tests/Treatments reviewed.  Follow up needed: surgeon today   Other Healthcare Providers Involved in Patient s Care:        niece and neighbors  Update since discharge: stable.     Post Discharge Medication Reconciliation: discharge medications reconciled and changed, per note/orders (see AVS).  Plan of care communicated with patient     Coding guidelines for this visit:  Type of Medical   Decision Making Face-to-Face Visit       within 7 Days of discharge Face-to-Face Visit        within 14 days of discharge   Moderate Complexity [id number]   High Complexity [id number]              S/p 2 lung surgeries; each for cancer.                  Still taking hydromorphone, 2 mg Q 6-8 hrs.            2 tablets left.        Wants a refill.                  Thoracic surg appt later today.                             Tolerating allopurinol 100 mg per day.                                        Problem list and histories reviewed & adjusted, as indicated.  Additional history: as documented    Current Outpatient Prescriptions   Medication Sig Dispense Refill     Acetaminophen (TYLENOL PO) Take 650 mg by mouth 3 times daily       albuterol (PROAIR HFA/PROVENTIL HFA/VENTOLIN HFA) 108 (90 Base) MCG/ACT inhaler Inhale 2 puffs into the lungs every 6 hours  as needed for shortness of breath / dyspnea or wheezing 1 Inhaler 5     allopurinol (ZYLOPRIM) 100 MG tablet Take 1 tablet (100 mg) by mouth daily 90 tablet 1     calcitRIOL (ROCALTROL) 0.25 MCG capsule Take 1 capsule by mouth every other day. 90 capsule 1     Carboxymethylcellul-Glycerin (CLEAR EYES FOR DRY EYES) 1-0.25 % SOLN Apply 1 drop to eye daily as needed        clopidogrel (PLAVIX) 75 MG tablet TAKE 1 TABLET EVERY DAY (Patient taking differently: Take 75 mg by mouth daily TAKE 1 TABLET EVERY DAY) 90 tablet 1     diclofenac (VOLTAREN) 1 % GEL Apply 2 grams to hands four times daily prn using enclosed dosing card. (Patient taking differently: Apply 2 g topically 4 times daily as needed Apply 2 grams to hands four times daily prn using enclosed dosing card.) 100 g 5     EPINEPHrine (EPIPEN) 0.3 MG/0.3ML injection Inject 0.3 mLs (0.3 mg) into the muscle once as needed for anaphylaxis 1 each 11     famciclovir (FAMVIR) 500 MG tablet TAKE 1 TABLET TWICE DAILY (Patient taking differently: TAKE 1 TABLET TWICE DAILY AS NEEDED FOR SHINGLES) 14 tablet 3     Furosemide (LASIX PO) Take 20 mg by mouth At Bedtime       FUROSEMIDE PO Take 40 mg by mouth every morning (2 x 20 mg = 40 mg dose)       HYDROmorphone (DILAUDID) 2 MG tablet Take 1 tablet (2 mg) by mouth every 6 hours as needed for moderate to severe pain 30 tablet 0     LEVOTHYROXINE SODIUM PO Take 125 mcg by mouth See Admin Instructions 6 times per week       LEVOTHYROXINE SODIUM PO Take 62.5 mcg by mouth once a week (Takes 0.5 x 125mcg tablet = 62.5mcg dose)   On Wednesdays       metoprolol succinate (TOPROL-XL) 25 MG 24 hr tablet TAKE 1 TABLET EVERY DAY (Patient taking differently: Take 25 mg by mouth daily TAKE 1 TABLET EVERY DAY) 90 tablet 3     multivitamin  with lutein (OCUVITE WITH LTEIN) CAPS Take 1 capsule by mouth daily       predniSONE (DELTASONE) 20 MG tablet TAKE ONE TABLET BY MOUTH TWICE DAILY ONLY WHEN NEEDED FOR GOUT (Patient taking differently:  "Take by mouth 2 times daily as needed (Gout) TAKE ONE TABLET BY MOUTH TWICE DAILY ONLY WHEN NEEDED FOR GOUT) 10 tablet 3     spironolactone (ALDACTONE) 50 MG tablet Take 1 tablet (50 mg) by mouth daily 30 tablet 1     TiZANidine HCl (ZANAFLEX PO) Take 2 mg by mouth 3 times daily        senna-docusate (SENOKOT-S;PERICOLACE) 8.6-50 MG per tablet Take 1-2 tablets by mouth 2 times daily as needed for constipation 30 tablet 0     simvastatin (ZOCOR) 40 MG tablet TAKE 1 TABLET EVERY DAY (Patient taking differently: Take 40 mg by mouth At Bedtime TAKE 1 TABLET EVERY DAY) 90 tablet 3     Allergies   Allergen Reactions     Aspirin Anaphylaxis     Gabapentin      Confusion     Latex      Sulfa Drugs      Tramadol      Nausea and Vomiting       Zithromax [Azithromycin Dihydrate]      Diarrhea.      Zovirax      Mood swings. Suicidal.     BP Readings from Last 3 Encounters:   10/29/18 130/78   10/19/18 100/52   10/04/18 102/53    Wt Readings from Last 3 Encounters:   10/29/18 180 lb (81.6 kg)   10/18/18 192 lb 0.3 oz (87.1 kg)   08/21/18 184 lb (83.5 kg)                    Reviewed and updated as needed this visit by clinical staff       Reviewed and updated as needed this visit by Provider         ROS:  CONSTITUTIONAL:NEGATIVE  for chills and fever   RESP:no unusual SOB    OBJECTIVE:                                                    /78 (BP Location: Left arm, Patient Position: Chair, Cuff Size: Adult Large)  Pulse 79  Temp 97.5  F (36.4  C)  Resp 22  Ht 5' 5.5\" (1.664 m)  Wt 180 lb (81.6 kg)  SpO2 99%  Breastfeeding? No  BMI 29.5 kg/m2  Body mass index is 29.5 kg/(m^2).  GENERAL APPEARANCE: alert and no distress  CV: regular rates and rhythm    Diagnostic test results:  pending     ASSESSMENT/PLAN:                                                        ICD-10-CM    1. Hyperuricemia E79.0 Uric acid   2. Post-thoracotomy pain G89.12 HYDROmorphone (DILAUDID) 2 MG tablet       I refilled the dilaudid.               " D/w pt that long term Rx will not be needed.                                                                                 She sees renal in 12/18  Patient Instructions   I will let you know your lab results,and whether you need to change the uric acid dose.                     Cody Obando MD  [facility]       Results for orders placed or performed in visit on 10/29/18   Uric acid   Result Value Ref Range    Uric Acid 7.1 (H) 2.6 - 6.0 mg/dL     My chart message sent.                       The uric acid level is better.  We would like it to be under 6.  Please increase the allopurinol to 150 mg or 1-1/2 tablets/day.

## 2018-10-29 NOTE — MR AVS SNAPSHOT
After Visit Summary   10/29/2018    Marina Harrison    MRN: [id number]           Patient Information     Date Of Birth          1940        Visit Information        Provider Department      10/29/2018 11:00 AM Cody Obando MD [facility]        Today's Diagnoses     Hyperuricemia    -  1    Post-thoracotomy pain          Care Instructions    I will let you know your lab results,and whether you need to change the uric acid dose.                         Follow-ups after your visit        Follow-up notes from your care team     Return in about 6 months (around 4/29/2019) for BP Recheck, medication review and refills, labs will be needed.      Who to contact     If you have questions or need follow up information about today's clinic visit or your schedule please contact [facility] directly at [phone].  Normal or non-critical lab and imaging results will be communicated to you by MyChart, letter or phone within 4 business days after the clinic has received the results. If you do not hear from us within 7 days, please contact the clinic through MyChart or phone. If you have a critical or abnormal lab result, we will notify you by phone as soon as possible.  Submit refill requests through [employer] or call your pharmacy and they will forward the refill request to us. Please allow 3 business days for your refill to be completed.          Additional Information About Your Visit        MyChart Information     [employer] gives you secure access to your electronic health record. If you see a primary care provider, you can also send messages to your care team and make appointments. If you have questions, please call your primary care clinic.  If you do not have a primary care provider, please call [phone] and they will assist you.        Care EveryWhere ID     This is your Care EveryWhere ID. This could be used by other  "organizations to access your [place] medical records  [id number]        Your Vitals Were     Pulse Temperature Respirations Height Pulse Oximetry Breastfeeding?    79 97.5  F (36.4  C) 22 5' 5.5\" (1.664 m) 99% No    BMI (Body Mass Index)                   29.5 kg/m2            Blood Pressure from Last 3 Encounters:   10/29/18 130/78   10/19/18 100/52   10/04/18 102/53    Weight from Last 3 Encounters:   10/29/18 180 lb (81.6 kg)   10/18/18 192 lb 0.3 oz (87.1 kg)   08/21/18 184 lb (83.5 kg)              We Performed the Following     Uric acid          Today's Medication Changes          These changes are accurate as of 10/29/18 11:29 AM.  If you have any questions, ask your nurse or doctor.               These medicines have changed or have updated prescriptions.        Dose/Directions    clopidogrel 75 MG tablet   Commonly known as:  PLAVIX   This may have changed:    - how much to take  - how to take this  - when to take this  - additional instructions   Used for:  Peripheral vascular disease (H)        TAKE 1 TABLET EVERY DAY   Quantity:  90 tablet   Refills:  1       diclofenac 1 % Gel topical gel   Commonly known as:  VOLTAREN   This may have changed:    - how much to take  - how to take this  - when to take this  - reasons to take this  - additional instructions   Used for:  Hand joint pain, unspecified laterality        Apply 2 grams to hands four times daily prn using enclosed dosing card.   Quantity:  100 g   Refills:  5       famciclovir 500 MG tablet   Commonly known as:  FAMVIR   This may have changed:  See the new instructions.   Used for:  Herpes zoster without complication        TAKE 1 TABLET TWICE DAILY   Quantity:  14 tablet   Refills:  3       metoprolol succinate 25 MG 24 hr tablet   Commonly known as:  TOPROL-XL   This may have changed:    - how much to take  - how to take this  - when to take this  - additional instructions   Used for:  Hypertension goal BP (blood pressure) < 140/90        " TAKE 1 TABLET EVERY DAY   Quantity:  90 tablet   Refills:  3       predniSONE 20 MG tablet   Commonly known as:  DELTASONE   This may have changed:    - how to take this  - when to take this  - reasons to take this  - additional instructions   Used for:  Podagra        TAKE ONE TABLET BY MOUTH TWICE DAILY ONLY WHEN NEEDED FOR GOUT   Quantity:  10 tablet   Refills:  3       simvastatin 40 MG tablet   Commonly known as:  ZOCOR   This may have changed:    - how much to take  - how to take this  - when to take this  - additional instructions   Used for:  Hyperlipidemia LDL goal <100        TAKE 1 TABLET EVERY DAY   Quantity:  90 tablet   Refills:  3            Where to get your medicines      Some of these will need a paper prescription and others can be bought over the counter.  Ask your nurse if you have questions.     Bring a paper prescription for each of these medications     HYDROmorphone 2 MG tablet               Information about OPIOIDS     PRESCRIPTION OPIOIDS: WHAT YOU NEED TO KNOW   We gave you an opioid (narcotic) pain medicine. It is important to manage your pain, but opioids are not always the best choice. You should first try all the other options your care team gave you. Take this medicine for as short a time (and as few doses) as possible.    Some activities can increase your pain, such as bandage changes or therapy sessions. It may help to take your pain medicine 30 to 60 minutes before these activities. Reduce your stress by getting enough sleep, working on hobbies you enjoy and practicing relaxation or meditation. Talk to your care team about ways to manage your pain beyond prescription opioids.    These medicines have risks:    DO NOT drive when on new or higher doses of pain medicine. These medicines can affect your alertness and reaction times, and you could be arrested for driving under the influence (DUI). If you need to use opioids long-term, talk to your care team about driving.    DO NOT  operate heavy machinery    DO NOT do any other dangerous activities while taking these medicines.    DO NOT drink any alcohol while taking these medicines.     If the opioid prescribed includes acetaminophen, DO NOT take with any other medicines that contain acetaminophen. Read all labels carefully. Look for the word  acetaminophen  or  Tylenol.  Ask your pharmacist if you have questions or are unsure.    You can get addicted to pain medicines, especially if you have a history of addiction (chemical, alcohol or substance dependence). Talk to your care team about ways to reduce this risk.    All opioids tend to cause constipation. Drink plenty of water and eat foods that have a lot of fiber, such as fruits, vegetables, prune juice, apple juice and high-fiber cereal. Take a laxative (Miralax, milk of magnesia, Colace, Senna) if you don t move your bowels at least every other day. Other side effects include upset stomach, sleepiness, dizziness, throwing up, tolerance (needing more of the medicine to have the same effect), physical dependence and slowed breathing.    Store your pills in a secure place, locked if possible. We will not replace any lost or stolen medicine. If you don t finish your medicine, please throw away (dispose) as directed by your pharmacist. The [place] Pollution [employer] has more information about safe disposal: [email or web address]://[email or web address].[email or web address].[email or web address]-medications         Primary Care Provider Office Phone # Fax #    Cody Obando [phone]       [address] [facility] [phone]        Equal Access to Services     KRYS BRUNNER : Hadii aad ku hadasho Soomaali, waaxda luqadaha, qaybta kaalmada adeegyada, aroldo saunders . So [facility] [phone].    ATENCIÓN: Si habla español, tiene a bolaños disposición servicios gratuitos de asistencia lingüística. Llame al [phone].    We comply with applicable federal civil rights  laws and [place] laws. We do not discriminate on the basis of race, color, national origin, age, disability, sex, sexual orientation, or gender identity.            Thank you!     Thank you for choosing [facility]  for your care. Our goal is always to provide you with excellent care. Hearing back from our patients is one way we can continue to improve our services. Please take a few minutes to complete the written survey that you may receive in the mail after your visit with us. Thank you!             Your Updated Medication List - Protect others around you: Learn how to safely use, store and throw away your medicines at www.disposemymeds.org.          This list is accurate as of 10/29/18 11:29 AM.  Always use your most recent med list.                   Brand Name Dispense Instructions for use Diagnosis    albuterol 108 (90 Base) MCG/ACT inhaler    PROAIR HFA/PROVENTIL HFA/VENTOLIN HFA    1 Inhaler    Inhale 2 puffs into the lungs every 6 hours as needed for shortness of breath / dyspnea or wheezing    Other emphysema (H)       allopurinol 100 MG tablet    ZYLOPRIM    90 tablet    Take 1 tablet (100 mg) by mouth daily    Podagra, Hyperuricemia       calcitRIOL 0.25 MCG capsule    ROCALTROL    90 capsule    Take 1 capsule by mouth every other day.        CLEAR EYES FOR DRY EYES 1-0.25 % Soln   Generic drug:  Carboxymethylcellul-Glycerin      Apply 1 drop to eye daily as needed        clopidogrel 75 MG tablet    PLAVIX    90 tablet    TAKE 1 TABLET EVERY DAY    Peripheral vascular disease (H)       diclofenac 1 % Gel topical gel    VOLTAREN    100 g    Apply 2 grams to hands four times daily prn using enclosed dosing card.    Hand joint pain, unspecified laterality       EPINEPHrine 0.3 MG/0.3ML injection 2-pack    EPIPEN/ADRENACLICK/or ANY BX GENERIC EQUIV    1 each    Inject 0.3 mLs (0.3 mg) into the muscle once as needed for anaphylaxis    Bee sting reaction, initial encounter        famciclovir 500 MG tablet    FAMVIR    14 tablet    TAKE 1 TABLET TWICE DAILY    Herpes zoster without complication       * FUROSEMIDE PO      Take 40 mg by mouth every morning (2 x 20 mg = 40 mg dose)        * LASIX PO      Take 20 mg by mouth At Bedtime        HYDROmorphone 2 MG tablet    DILAUDID    30 tablet    Take 1 tablet (2 mg) by mouth every 6 hours as needed for moderate to severe pain    Post-thoracotomy pain       * LEVOTHYROXINE SODIUM PO      Take 125 mcg by mouth See Admin Instructions 6 times per week        * LEVOTHYROXINE SODIUM PO      Take 62.5 mcg by mouth once a week (Takes 0.5 x 125mcg tablet = 62.5mcg dose)  On Wednesdays        metoprolol succinate 25 MG 24 hr tablet    TOPROL-XL    90 tablet    TAKE 1 TABLET EVERY DAY    Hypertension goal BP (blood pressure) < 140/90       multivitamin  with lutein Caps per capsule      Take 1 capsule by mouth daily        predniSONE 20 MG tablet    DELTASONE    10 tablet    TAKE ONE TABLET BY MOUTH TWICE DAILY ONLY WHEN NEEDED FOR GOUT    Podagra       senna-docusate 8.6-50 MG per tablet    SENOKOT-S;PERICOLACE    30 tablet    Take 1-2 tablets by mouth 2 times daily as needed for constipation    Right lower lobe pulmonary nodule, Constipation due to opioid therapy       simvastatin 40 MG tablet    ZOCOR    90 tablet    TAKE 1 TABLET EVERY DAY    Hyperlipidemia LDL goal <100       spironolactone 50 MG tablet    ALDACTONE    30 tablet    Take 1 tablet (50 mg) by mouth daily        TYLENOL PO      Take 650 mg by mouth 3 times daily        ZANAFLEX PO      Take 2 mg by mouth 3 times daily        * Notice:  This list has 4 medication(s) that are the same as other medications prescribed for you. Read the directions carefully, and ask your doctor or other care provider to review them with you.

## 2018-10-30 LAB — URATE SERPL-MCNC: 7.1 MG/DL (ref 2.6–6)

## 2018-10-30 RX ORDER — ALLOPURINOL 100 MG/1
150 TABLET ORAL DAILY
Qty: 135 TABLET | Refills: 3 | Status: SHIPPED | OUTPATIENT
Start: 2018-10-30 | End: 2019-02-11

## 2018-11-08 ENCOUNTER — TRANSFERRED RECORDS (OUTPATIENT)
Dept: HEALTH INFORMATION MANAGEMENT | Facility: CLINIC | Age: 78
End: 2018-11-08

## 2019-01-16 ENCOUNTER — MYC MEDICAL ADVICE (OUTPATIENT)
Dept: FAMILY MEDICINE | Facility: CLINIC | Age: 79
End: 2019-01-16

## 2019-01-16 DIAGNOSIS — M19.041 PRIMARY OSTEOARTHRITIS OF BOTH HANDS: Primary | ICD-10-CM

## 2019-01-16 DIAGNOSIS — M19.042 PRIMARY OSTEOARTHRITIS OF BOTH HANDS: Primary | ICD-10-CM

## 2019-01-16 NOTE — TELEPHONE ENCOUNTER
Requested Prescriptions   Pending Prescriptions Disp Refills     diclofenac (VOLTAREN) 1 % topical gel 100 g 5     Sig: Apply 2 grams to hands four times daily prn using enclosed dosing card.    There is no refill protocol information for this order        Last Written Prescription Date:  7/19/2016  Last Fill Quantity: 100g,  # refills: 5  Last office visit: 10/29/2018 with prescribing provider:  Dr. Obando  Future Office Visit: None noted    Routing refill request to provider for review/approval because:  A break in medication  Allergy/contrindication advisement.

## 2019-01-23 ENCOUNTER — TRANSFERRED RECORDS (OUTPATIENT)
Dept: HEALTH INFORMATION MANAGEMENT | Facility: CLINIC | Age: 79
End: 2019-01-23

## 2019-01-23 LAB
CREAT SERPL-MCNC: 2.25 MG/DL (ref 0.57–1.11)
GFR SERPL CREATININE-BSD FRML MDRD: 21 ML/MIN/1.73M2
GLUCOSE SERPL-MCNC: 108 MG/DL (ref 70–96)
POTASSIUM SERPL-SCNC: 4.3 MMOL/L (ref 3.5–5.1)

## 2019-01-29 DIAGNOSIS — E79.0 HYPERURICEMIA: ICD-10-CM

## 2019-02-08 ENCOUNTER — MYC REFILL (OUTPATIENT)
Dept: FAMILY MEDICINE | Facility: CLINIC | Age: 79
End: 2019-02-08

## 2019-02-08 ENCOUNTER — MYC MEDICAL ADVICE (OUTPATIENT)
Dept: FAMILY MEDICINE | Facility: CLINIC | Age: 79
End: 2019-02-08

## 2019-02-08 DIAGNOSIS — E79.0 HYPERURICEMIA: ICD-10-CM

## 2019-02-08 DIAGNOSIS — M10.9 PODAGRA: ICD-10-CM

## 2019-02-08 RX ORDER — ALLOPURINOL 100 MG/1
150 TABLET ORAL DAILY
Qty: 135 TABLET | Refills: 3 | Status: CANCELLED | OUTPATIENT
Start: 2019-02-08

## 2019-02-11 RX ORDER — ALLOPURINOL 100 MG/1
150 TABLET ORAL DAILY
Qty: 135 TABLET | Refills: 2 | Status: SHIPPED | OUTPATIENT
Start: 2019-02-11 | End: 2019-09-23

## 2019-02-11 NOTE — TELEPHONE ENCOUNTER
In [employer] message, patient requesting active Rx of Allopurinol be sent to [facility] instead of [facility]. Called [facility] to let them know that she will be switching pharmacies. They are keeping it on file just in case needed in the future.

## 2019-02-18 ENCOUNTER — TRANSFERRED RECORDS (OUTPATIENT)
Dept: HEALTH INFORMATION MANAGEMENT | Facility: CLINIC | Age: 79
End: 2019-02-18

## 2019-02-20 PROBLEM — C34.12 MALIGNANT NEOPLASM OF UPPER LOBE OF LEFT LUNG (H): Status: ACTIVE | Noted: 2018-10-19

## 2019-02-20 PROBLEM — C34.90 NON-SMALL CELL LUNG CANCER (H): Status: ACTIVE | Noted: 2019-02-20

## 2019-02-20 PROBLEM — R91.8 MASS OF UPPER LOBE OF LEFT LUNG: Status: ACTIVE | Noted: 2018-10-16

## 2019-03-12 ENCOUNTER — MYC MEDICAL ADVICE (OUTPATIENT)
Dept: FAMILY MEDICINE | Facility: CLINIC | Age: 79
End: 2019-03-12

## 2019-03-15 DIAGNOSIS — I73.9 PERIPHERAL VASCULAR DISEASE (H): ICD-10-CM

## 2019-03-16 NOTE — TELEPHONE ENCOUNTER
"Requested Prescriptions   Pending Prescriptions Disp Refills     clopidogrel (PLAVIX) 75 MG tablet [Pharmacy Med Name: CLOPIDOGREL 75 MG Tablet]  Last Written Prescription Date:  10/05/2018  Last Fill Quantity: 90,  # refills: 1   Last office visit: 10/29/2018 with prescribing provider:  star   Future Office Visit:     90 tablet 1     Sig: TAKE 1 TABLET EVERY DAY    Plavix Failed - 3/15/2019  5:09 PM       Failed - Normal HGB on file in past 12 months    Recent Labs   Lab Test 10/16/18  0859   HGB 11.2*              Passed - No active PPI on record unless is Protonix       Passed - Normal Platelets on file in past 12 months    Recent Labs   Lab Test 10/18/18  0605                 Passed - Recent (12 mo) or future (30 days) visit within the authorizing provider's specialty    Patient had office visit in the last 12 months or has a visit in the next 30 days with authorizing provider or within the authorizing provider's specialty.  See \"Patient Info\" tab in inbasket, or \"Choose Columns\" in Meds & Orders section of the refill encounter.             Passed - Medication is active on med list       Passed - Patient is age 18 or older       Passed - No active pregnancy on record       Passed - No positive pregnancy test in past 12 months          "

## 2019-03-18 RX ORDER — CLOPIDOGREL BISULFATE 75 MG/1
TABLET ORAL
Qty: 90 TABLET | Refills: 4 | Status: SHIPPED | OUTPATIENT
Start: 2019-03-18 | End: 2020-03-18

## 2019-03-18 NOTE — TELEPHONE ENCOUNTER
Routing refill request to provider for review/approval because:  Hgb out of range    Isadora Llamas RN- [facility]riage [employer]

## 2019-05-13 ENCOUNTER — TRANSFERRED RECORDS (OUTPATIENT)
Dept: HEALTH INFORMATION MANAGEMENT | Facility: CLINIC | Age: 79
End: 2019-05-13

## 2019-06-16 DIAGNOSIS — B02.9 HERPES ZOSTER WITHOUT COMPLICATION: ICD-10-CM

## 2019-06-17 NOTE — TELEPHONE ENCOUNTER
"Requested Prescriptions   Pending Prescriptions Disp Refills     famciclovir (FAMVIR) 500 MG tablet [Pharmacy Med Name: FAMCICLOVIR 500 MG Tablet]  Last Written Prescription Date:  9/20/2018  Last Fill Quantity: 14 tablet,  # refills: 3   Last office visit: 10/29/2018 with prescribing provider:  Gisella   Future Office Visit:     14 tablet 3     Sig: TAKE 1 TABLET TWICE DAILY       Antivirals for Herpes Protocol Passed - 6/16/2019  5:01 PM        Passed - Patient is age 12 or older        Passed - Recent (12 mo) or future (30 days) visit within the authorizing provider's specialty     Patient had office visit in the last 12 months or has a visit in the next 30 days with authorizing provider or within the authorizing provider's specialty.  See \"Patient Info\" tab in inbasket, or \"Choose Columns\" in Meds & Orders section of the refill encounter.              Passed - Medication is active on med list           "

## 2019-06-18 NOTE — TELEPHONE ENCOUNTER
Routing refill request to provider for review/approval because:  Please clarify directions. Patient taking differently than prescribed

## 2019-06-20 RX ORDER — FAMCICLOVIR 500 MG/1
TABLET ORAL
Qty: 14 TABLET | Refills: 3 | Status: SHIPPED | OUTPATIENT
Start: 2019-06-20 | End: 2019-09-23

## 2019-06-20 NOTE — TELEPHONE ENCOUNTER
Yes I still take for shingles, which I have right now.  I keep a prescription on hand all the time. Take one twice a day   I get them 3 - 4 times a year.   Marina Antonio   [birth year]       Routing patient response, above to provider.

## 2019-06-26 DIAGNOSIS — E03.9 HYPOTHYROIDISM, UNSPECIFIED TYPE: Primary | ICD-10-CM

## 2019-06-26 DIAGNOSIS — I10 HYPERTENSION GOAL BP (BLOOD PRESSURE) < 140/90: ICD-10-CM

## 2019-06-26 NOTE — TELEPHONE ENCOUNTER
"LEVOTHYROXINE SODIUM 125 MCG Tablet  Last Written Prescription Date:  09/06/2016  Last Fill Quantity: 90,  # refills: 3   Last office visit: 10/29/2018 with prescribing provider:  10/29/2018   Future Office Visit:    Requested Prescriptions   Pending Prescriptions Disp Refills     levothyroxine (SYNTHROID/LEVOTHROID) 125 MCG tablet [Pharmacy Med Name: LEVOTHYROXINE SODIUM 125 MCG Tablet] 85 tablet 3     Sig: TAKE 1 TABLET 6 DAYS A WEEK AND TAKE 1/2 TABLET ON THE SEVENTH DAY       Thyroid Protocol Passed - 6/26/2019  4:20 PM        Passed - Patient is 12 years or older        Passed - Recent (12 mo) or future (30 days) visit within the authorizing provider's specialty     Patient had office visit in the last 12 months or has a visit in the next 30 days with authorizing provider or within the authorizing provider's specialty.  See \"Patient Info\" tab in inbasket, or \"Choose Columns\" in Meds & Orders section of the refill encounter.              Passed - Medication is active on med list        Passed - Normal TSH on file in past 12 months     Recent Labs   Lab Test 08/21/18  1038   TSH 2.34              Passed - No active pregnancy on record     If patient is pregnant or has had a positive pregnancy test, please check TSH.          Passed - No positive pregnancy test in past 12 months     If patient is pregnant or has had a positive pregnancy test, please check TSH.          metoprolol succinate ER (TOPROL-XL) 25 MG 24 hr tablet [Pharmacy Med Name: METOPROLOL SUCCINATE ER 25 MG Tablet Extended Release 24 Hour] 90 tablet 3     Sig: TAKE 1 TABLET EVERY DAY       Beta-Blockers Protocol Passed - 6/26/2019  4:20 PM        Passed - Blood pressure under 140/90 in past 12 months     BP Readings from Last 3 Encounters:   10/29/18 130/78   10/19/18 100/52   10/04/18 102/53                 Passed - Patient is age 6 or older        Passed - Recent (12 mo) or future (30 days) visit within the authorizing provider's specialty     " "Patient had office visit in the last 12 months or has a visit in the next 30 days with authorizing provider or within the authorizing provider's specialty.  See \"Patient Info\" tab in inbasket, or \"Choose Columns\" in Meds & Orders section of the refill encounter.              Passed - Medication is active on med list      METOPROLOL SUCCINATE ER 25 MG Tablet Extended Release 24 Hour  Last Written Prescription Date:  08/21/2018    Last Fill Quantity: 90,  # refills: 3   Last office visit: 10/29/2018 with prescribing provider:  10/29/2019   Future Office Visit:    Requested Prescriptions   Pending Prescriptions Disp Refills     levothyroxine (SYNTHROID/LEVOTHROID) 125 MCG tablet [Pharmacy Med Name: LEVOTHYROXINE SODIUM 125 MCG Tablet] 85 tablet 3     Sig: TAKE 1 TABLET 6 DAYS A WEEK AND TAKE 1/2 TABLET ON THE SEVENTH DAY       Thyroid Protocol Passed - 6/26/2019  4:57 PM        Passed - Patient is 12 years or older        Passed - Recent (12 mo) or future (30 days) visit within the authorizing provider's specialty     Patient had office visit in the last 12 months or has a visit in the next 30 days with authorizing provider or within the authorizing provider's specialty.  See \"Patient Info\" tab in inbasket, or \"Choose Columns\" in Meds & Orders section of the refill encounter.              Passed - Medication is active on med list        Passed - Normal TSH on file in past 12 months     Recent Labs   Lab Test 08/21/18  1038   TSH 2.34              Passed - No active pregnancy on record     If patient is pregnant or has had a positive pregnancy test, please check TSH.          Passed - No positive pregnancy test in past 12 months     If patient is pregnant or has had a positive pregnancy test, please check TSH.          metoprolol succinate ER (TOPROL-XL) 25 MG 24 hr tablet [Pharmacy Med Name: METOPROLOL SUCCINATE ER 25 MG Tablet Extended Release 24 Hour] 90 tablet 3     Sig: TAKE 1 TABLET EVERY DAY       Beta-Blockers " "Protocol Passed - 6/26/2019  4:57 PM        Passed - Blood pressure under 140/90 in past 12 months     BP Readings from Last 3 Encounters:   10/29/18 130/78   10/19/18 100/52   10/04/18 102/53                 Passed - Patient is age 6 or older        Passed - Recent (12 mo) or future (30 days) visit within the authorizing provider's specialty     Patient had office visit in the last 12 months or has a visit in the next 30 days with authorizing provider or within the authorizing provider's specialty.  See \"Patient Info\" tab in inbasket, or \"Choose Columns\" in Meds & Orders section of the refill encounter.              Passed - Medication is active on med list            "

## 2019-06-27 RX ORDER — LEVOTHYROXINE SODIUM 125 UG/1
TABLET ORAL
Qty: 85 TABLET | Refills: 3 | Status: SHIPPED | OUTPATIENT
Start: 2019-06-27 | End: 2020-06-21

## 2019-06-27 RX ORDER — METOPROLOL SUCCINATE 25 MG/1
TABLET, EXTENDED RELEASE ORAL
Qty: 90 TABLET | Refills: 3 | OUTPATIENT
Start: 2019-06-27

## 2019-06-27 NOTE — TELEPHONE ENCOUNTER
Routing refill request to provider for review/approval because:  A break in medication: Levothyroxine    Patient has refills remaining at pharmacy: metoprolol  RY MadridN, RN  [employer] [facility]

## 2019-08-19 ENCOUNTER — TRANSFERRED RECORDS (OUTPATIENT)
Dept: HEALTH INFORMATION MANAGEMENT | Facility: CLINIC | Age: 79
End: 2019-08-19

## 2019-08-19 LAB
CREAT SERPL-MCNC: 2.36 MG/DL (ref 0.57–1.11)
GFR SERPL CREATININE-BSD FRML MDRD: 20 ML/MIN/1.73M2
GLUCOSE SERPL-MCNC: 100 MG/DL (ref 70–95)
POTASSIUM SERPL-SCNC: 4.2 MMOL/L (ref 3.5–5.1)

## 2019-08-24 ASSESSMENT — ACTIVITIES OF DAILY LIVING (ADL): CURRENT_FUNCTION: NO ASSISTANCE NEEDED

## 2019-08-25 PROBLEM — R93.89 ABNORMAL CT OF THE CHEST: Status: ACTIVE | Noted: 2018-09-05

## 2019-08-25 PROBLEM — Z85.118 HISTORY OF LUNG CANCER: Status: ACTIVE | Noted: 2019-08-25

## 2019-08-27 ENCOUNTER — OFFICE VISIT (OUTPATIENT)
Dept: FAMILY MEDICINE | Facility: CLINIC | Age: 79
End: 2019-08-27
Payer: MEDICARE

## 2019-08-27 VITALS
TEMPERATURE: 97.5 F | HEART RATE: 76 BPM | BODY MASS INDEX: 29.41 KG/M2 | SYSTOLIC BLOOD PRESSURE: 118 MMHG | OXYGEN SATURATION: 99 % | RESPIRATION RATE: 20 BRPM | DIASTOLIC BLOOD PRESSURE: 70 MMHG | WEIGHT: 183 LBS | HEIGHT: 66 IN

## 2019-08-27 DIAGNOSIS — N18.30 CHRONIC RENAL IMPAIRMENT, STAGE 3 (MODERATE) (H): ICD-10-CM

## 2019-08-27 DIAGNOSIS — E78.5 HYPERLIPIDEMIA LDL GOAL <100: ICD-10-CM

## 2019-08-27 DIAGNOSIS — E79.0 HYPERURICEMIA: ICD-10-CM

## 2019-08-27 DIAGNOSIS — I10 ESSENTIAL HYPERTENSION WITH GOAL BLOOD PRESSURE LESS THAN 130/80: ICD-10-CM

## 2019-08-27 DIAGNOSIS — Z00.00 ROUTINE GENERAL MEDICAL EXAMINATION AT A HEALTH CARE FACILITY: Primary | ICD-10-CM

## 2019-08-27 DIAGNOSIS — E03.9 HYPOTHYROIDISM, UNSPECIFIED TYPE: ICD-10-CM

## 2019-08-27 DIAGNOSIS — Z85.118 HISTORY OF LUNG CANCER: ICD-10-CM

## 2019-08-27 PROCEDURE — 84460 ALANINE AMINO (ALT) (SGPT): CPT | Performed by: INTERNAL MEDICINE

## 2019-08-27 PROCEDURE — 99213 OFFICE O/P EST LOW 20 MIN: CPT | Mod: 25 | Performed by: INTERNAL MEDICINE

## 2019-08-27 PROCEDURE — 80061 LIPID PANEL: CPT | Performed by: INTERNAL MEDICINE

## 2019-08-27 PROCEDURE — 36415 COLL VENOUS BLD VENIPUNCTURE: CPT | Performed by: INTERNAL MEDICINE

## 2019-08-27 PROCEDURE — 84443 ASSAY THYROID STIM HORMONE: CPT | Performed by: INTERNAL MEDICINE

## 2019-08-27 PROCEDURE — G0439 PPPS, SUBSEQ VISIT: HCPCS | Performed by: INTERNAL MEDICINE

## 2019-08-27 ASSESSMENT — MIFFLIN-ST. JEOR: SCORE: 1303.89

## 2019-08-27 ASSESSMENT — ACTIVITIES OF DAILY LIVING (ADL): CURRENT_FUNCTION: NO ASSISTANCE NEEDED

## 2019-08-27 NOTE — PROGRESS NOTES
"SUBJECTIVE:   Marina Harrison is a 79 year old female who presents for Preventive Visit.      Are you in the first 12 months of your Medicare coverage?  No    Healthy Habits:     In general, how would you rate your overall health?  Fair    Frequency of exercise:  1 day/week    Duration of exercise:  15-30 minutes    Do you usually eat at least 4 servings of fruit and vegetables a day, include whole grains    & fiber and avoid regularly eating high fat or \"junk\" foods?  No    Medication side effects:  Other    Ability to successfully perform activities of daily living:  No assistance needed    Home Safety:  No safety concerns identified    Hearing Impairment:  No hearing concerns    In the past 6 months, have you been bothered by leaking of urine? Yes    In general, how would you rate your overall mental or emotional health?  Good      PHQ-2 Total Score: 1    Additional concerns today:  No    Do you feel safe in your environment? Yes    Do you have a Health Care Directive? Yes- see chart    Fall risk     No falls within last year  Cognitive Screening   1) Repeat 3 items (Leader, Season, Table)    2) Clock draw: NORMAL  3) 3 item recall: Recalls 2 objects   Results: NORMAL clock, 1-2 items recalled: COGNITIVE IMPAIRMENT LESS LIKELY    Mini-CogTM Copyright S Yared. Licensed by the author for use in [facility]; reprinted with permission (jon@.[facility]). All rights reserved.      Do you have sleep apnea, excessive snoring or daytime drowsiness?: no    Reviewed and updated as needed this visit by clinical staff  Tobacco  Allergies  Meds  Med Hx  Surg Hx  Fam Hx  Soc Hx        Reviewed and updated as needed this visit by Provider        Social History     Tobacco Use     Smoking status: Former Smoker     Packs/day: 1.50     Years: 43.00     Pack years: 64.50     Start date: 1955     Last attempt to quit: 5/15/1998     Years since quittin.2     Smokeless tobacco: Never Used   Substance " Use Topics     Alcohol use: Yes     Comment: 1wine weekly     If you drink alcohol do you typically have >3 drinks per day or >7 drinks per week? No    Alcohol Use 8/24/2019   Prescreen: >3 drinks/day or >7 drinks/week? No   Prescreen: >3 drinks/day or >7 drinks/week? -            she sees her chest surgeon soon. She has decided not to have further lung surgery.                 She saw Nephrology recently; GFR down to 20. She has decided to not have HD therapy.                       Other labs included uric acid 6.7; (no recent gout), glucose 100. CBC ok.              She is wearing support stockings.                         Current providers sharing in care for this patient include:   Patient Care Team:  Cody Obando MD as PCP - General (Internal Medicine)  Cody Obando MD as Assigned PCP    The following health maintenance items are reviewed in Epic and correct as of today:  Health Maintenance   Topic Date Due     URINE DRUG SCREEN  1940     COPD ACTION PLAN  1940     ZOSTER IMMUNIZATION (2 of 3) 06/24/2010     FALL RISK ASSESSMENT  07/12/2018     MEDICARE ANNUAL WELLNESS VISIT  08/21/2019     CARLEE ASSESSMENT  08/21/2019     PHQ-9  08/21/2019     INFLUENZA VACCINE (1) 09/01/2019     ADVANCE CARE PLANNING  10/08/2023     DTAP/TDAP/TD IMMUNIZATION (3 - Td) 06/23/2025     DEXA  Completed     SPIROMETRY  Completed     PNEUMOCOCCAL IMMUNIZATION 65+ HIGH/HIGHEST RISK  Completed     IPV IMMUNIZATION  Aged Out     MENINGITIS IMMUNIZATION  Aged Out     Patient Active Problem List    Diagnosis Date Noted     Non-small cell lung cancer (H) 02/20/2019     Priority: High     RLL; resected; 10/18       Malignant neoplasm of upper lobe of left lung (H) 10/19/2018     Priority: High     [place]; resected 10/18;        F/u CT in 2/19 NAD ; f/u 5/19: new 7 mm nodule LLL; 4 month f/u planned by Dr Singh       Mass of upper lobe of left lung 10/16/2018     Priority: High     Abnormal CT of the chest 09/05/2018      "Priority: High      Chest CT shows \"abnormal findings left apex and right posteromedial base; neoplasia is not excludable.    Consideration could be given to doing a PET/CT scan.  At the least would recommend follow-up CT in 3 months.\"       Personal history of tobacco use, presenting hazards to health 06/26/2016     Priority: High     Quit in 19_98_;            Pack yrs = more than 60.                           She declines CXR in 2018       Essential hypertension with goal blood pressure less than 130/80 01/09/2015     Priority: High     Chronic renal insufficiency 06/17/2014     Priority: High     Peripheral vascular disease (H) 05/15/2013     Priority: High     MELANIE 1.1 bilat in 6/13; stable  Problem list name updated by automated process. Provider to review       Hypothyroidism 05/15/2013     Priority: High     Hyperlipidemia LDL goal <100 05/15/2013     Priority: High     Cor angio approx 11/09; \"minimal disease\"      Lipids were good at renal; see 6/14 note       History of lung cancer 08/25/2019     Priority: Medium     Primary osteoarthritis of both hands 01/16/2019     Priority: Medium     Post-thoracotomy pain 10/29/2018     Priority: Medium     Right lower lobe pulmonary nodule 10/02/2018     Priority: Medium     Adrenal adenoma, right 09/05/2018     Priority: Medium           Abdominal CT shows \"a large R adrenal adenoma\"; no size is given       Hyperuricemia 08/22/2018     Priority: Medium     9.6; add 100 mg allopurinol;        7.1 in 10/18,increase to 150 mg ; 6.9 in 1/19 with renal       Other specified hypothyroidism 08/21/2018     Priority: Medium     Hypothyroidism, unspecified type 08/21/2018     Priority: Medium     Right lower quadrant pain 08/21/2018     Priority: Medium     Cervical spine arthritis 07/12/2017     Priority: Medium     Chronic pain syndrome 05/17/2016     Priority: Medium     Podagra 07/14/2015     Priority: Medium     Uric acid 9.6 in 8/18; add allopurinol       Hand joint " pain 04/07/2015     Priority: Medium     Bee sting reaction 06/26/2014     Priority: Medium     Osteopenia 06/17/2014     Priority: Medium     Mild; in 2009, T-1.6 R, -0.4 spine       Back pain 12/31/2012     Priority: Medium     COPD (chronic obstructive pulmonary disease) (H) 06/06/2012     Priority: Medium     Listed as a Dx in [facility] on 6/6/12       Preventive measure 05/15/2013     Priority: Low     [facility] DATA BASE UNDER THE 5/15/13 NOTE  Colonoscopy approx 2005; pt declines another         S/p bilateral mastectomy; s/p hysterectomy         Past Surgical History:   Procedure Laterality Date     AORTO BI ILIAC BYPASS Bilateral 1999     APPENDECTOMY       BUNIONECTOMY       CHOLECYSTECTOMY       ENT SURGERY      sinus surgery     EYE SURGERY Right     cataract     GYN SURGERY      D & C     HEMORRHOIDECTOMY       HYSTERECTOMY, PAP NO LONGER INDICATED  1976    no oophorectomy     JOINT REPLACEMENT, HIP RT/LT Bilateral 2005    Joint Replacement Hip RT/LT     MASTECTOMY, SIMPLE RT/LT/DARLENE Bilateral 1974    fibrocystic disease     ORTHOPEDIC SURGERY  1991    repair fracture of left leg     SPINE SURGERY  1972,1982     THORACOSCOPIC WEDGE RESECTION LUNG Right 10/2/2018    Procedure: THORACOSCOPIC WEDGE RESECTION LUNG;  RIGHT VIDEO ASSISTED THORACOSCOPY WEDGE RESECTION RIGHT, LOWER LOBE LUNG NODULE, LIMITED THORACOTOMY;  Surgeon: Levy Jaquez MD;  Location: [facility] OR     THORACOTOMY, WEDGE RESECTION LUNG, COMBINED Left 10/16/2018    Procedure: LEFT THORACOTOMY, WEDGE RESECTION LEFT UPPER LOBE LUNG NODULE. MEDIASTINAL LYMPH NODE DISSECTION. ;  Surgeon: Levy Jaquez MD;  Location: [facility] OR     THYROIDECTOMY       goiter       BP Readings from Last 3 Encounters:   08/27/19 118/70   10/29/18 130/78   10/19/18 100/52    Wt Readings from Last 3 Encounters:   08/27/19 83 kg (183 lb)   10/29/18 81.6 kg (180 lb)   10/18/18 87.1 kg (192 lb 0.3 oz)                  Current Outpatient Medications   Medication Sig  Dispense Refill     Acetaminophen (TYLENOL PO) Take 650 mg by mouth 3 times daily       albuterol (PROAIR HFA/PROVENTIL HFA/VENTOLIN HFA) 108 (90 Base) MCG/ACT inhaler Inhale 2 puffs into the lungs every 6 hours as needed for shortness of breath / dyspnea or wheezing 1 Inhaler 5     allopurinol (ZYLOPRIM) 100 MG tablet Take 1.5 tablets (150 mg) by mouth daily 135 tablet 2     calcitRIOL (ROCALTROL) 0.25 MCG capsule Take 1 capsule by mouth every other day. 90 capsule 1     Carboxymethylcellul-Glycerin (CLEAR EYES FOR DRY EYES) 1-0.25 % SOLN Apply 1 drop to eye daily as needed        diclofenac (VOLTAREN) 1 % topical gel Apply 2 grams to hands four times daily prn using enclosed dosing card. 100 g 5     EPINEPHrine (EPIPEN) 0.3 MG/0.3ML injection Inject 0.3 mLs (0.3 mg) into the muscle once as needed for anaphylaxis 1 each 11     famciclovir (FAMVIR) 500 MG tablet TAKE 1 TABLET TWICE DAILY 14 tablet 3     Furosemide (LASIX PO) Take 20 mg by mouth At Bedtime       FUROSEMIDE PO Take 40 mg by mouth every morning (2 x 20 mg = 40 mg dose)       levothyroxine (SYNTHROID/LEVOTHROID) 125 MCG tablet TAKE 1 TABLET 6 DAYS A WEEK AND TAKE 1/2 TABLET ON THE SEVENTH DAY 85 tablet 3     LEVOTHYROXINE SODIUM PO Take 62.5 mcg by mouth once a week (Takes 0.5 x 125mcg tablet = 62.5mcg dose)   On Wednesdays       metoprolol succinate (TOPROL-XL) 25 MG 24 hr tablet TAKE 1 TABLET EVERY DAY (Patient taking differently: Take 25 mg by mouth daily TAKE 1 TABLET EVERY DAY) 90 tablet 3     multivitamin  with lutein (OCUVITE WITH LTEIN) CAPS Take 1 capsule by mouth daily       senna-docusate (SENOKOT-S;PERICOLACE) 8.6-50 MG per tablet Take 1-2 tablets by mouth 2 times daily as needed for constipation 30 tablet 0     simvastatin (ZOCOR) 40 MG tablet TAKE 1 TABLET EVERY DAY (Patient taking differently: Take 40 mg by mouth At Bedtime TAKE 1 TABLET EVERY DAY) 90 tablet 3     spironolactone (ALDACTONE) 50 MG tablet Take 1 tablet (50 mg) by mouth  "daily 30 tablet 1     TiZANidine HCl (ZANAFLEX PO) Take 2 mg by mouth 3 times daily        clopidogrel (PLAVIX) 75 MG tablet TAKE 1 TABLET EVERY DAY 90 tablet 4     Allergies   Allergen Reactions     Aspirin Anaphylaxis     Ciprofloxacin      See message from patient 4/19     Gabapentin      Confusion     Latex      Sulfa Drugs      Tramadol      Nausea and Vomiting       Zithromax [Azithromycin Dihydrate]      Diarrhea.      Zovirax      Mood swings. Suicidal.         Review of Systems  CONSTITUTIONAL: NEGATIVE for fever, chills, change in weight  INTEGUMENTARY/SKIN: NEGATIVE for worrisome rashes, moles or lesions  EYES: NEGATIVE for vision changes or irritation  ENT/MOUTH: NEGATIVE for ear, mouth and throat problems  RESP:POSITIVE for dyspnea on exertion  CV: NEGATIVE for chest pain/chest pressure and palpitations  GI: NEGATIVE for nausea, abdominal pain, heartburn, or change in bowel habits  : NEGATIVE for frequency, dysuria, or hematuria  MUSCULOSKELETAL:arthralgias   NEURO: NEGATIVE for weakness, dizziness or paresthesias  ENDOCRINE: NEGATIVE for temperature intolerance, skin/hair changes  HEME: NEGATIVE for bleeding problems  PSYCHIATRIC: NEGATIVE for changes in mood or affect    OBJECTIVE:   /70 (BP Location: Left arm, Patient Position: Chair, Cuff Size: Adult Large)   Pulse 76   Temp 97.5  F (36.4  C)   Resp 20   Ht 1.664 m (5' 5.5\")   Wt 83 kg (183 lb)   SpO2 99%   Breastfeeding? No   BMI 29.99 kg/m   Estimated body mass index is 29.5 kg/m  as calculated from the following:    Height as of 10/29/18: 1.664 m (5' 5.5\").    Weight as of 10/29/18: 81.6 kg (180 lb).  Physical Exam  GENERAL APPEARANCE: alert and no distress  EYES: Eyes grossly normal to inspection  HENT: ear canals and TM's normal, nose and mouth without ulcers or lesions, oropharynx clear and oral mucous membranes moist  NECK: no adenopathy and s/p thyroidectomy  RESP: decreased breath sounds bilateral  BREAST: s/p bilat " "mastectomy  CV: regular rates and rhythm, normal S1 S2, no S3 or S4, no murmur, click or rub, peripheral pulses strong and 1+ bilateral lower extremity pitting edema to knees; wearing support stockings    ABDOMEN: soft, nontender, no hepatosplenomegaly and no masses  SKIN: no suspicious lesions or rashes  NEURO: Normal strength and tone, mentation intact and speech normal  PSYCH: mentation appears normal and affect normal/bright    Diagnostic Test Results:  pending    ASSESSMENT / PLAN:   Marina was seen today for physical.    Diagnoses and all orders for this visit:    Routine general medical examination at a health care facility    Hypothyroidism, unspecified type  -     TSH with free T4 reflex    Hyperuricemia    History of lung cancer    Chronic renal impairment, stage 3 (moderate) (H)    Essential hypertension with goal blood pressure less than 130/80    Hyperlipidemia LDL goal <100  -     Lipid Profile (Chol, Trig, HDL, LDL calc)  -     ALT                  Summary and implications:  We reviewed multiple issues.           We reviewed all of the issues on the diagnoses list.                         Await f/u chest CT.                  Check labs and adjust medications as indicated.  Patient Instructions   I will let you know your lab results.   Consider getting the shingles vaccine (Shingrix) at your pharmacy.       Return in about 1 year (around 8/27/2020) for yearly wellness visit, BP Recheck, thyroid labs, lipid follow up, labs will be needed.      End of Life Planning:  Patient currently has an advanced directive: Yes.  Practitioner is supportive of decision.    COUNSELING:  Reviewed preventive health counseling, as reflected in patient instructions  Special attention given to:       Regular exercise       Healthy diet/nutrition    Estimated body mass index is 29.5 kg/m  as calculated from the following:    Height as of 10/29/18: 1.664 m (5' 5.5\").    Weight as of 10/29/18: 81.6 kg (180 lb).         reports " that she quit smoking about 21 years ago. She started smoking about 64 years ago. She has a 64.50 pack-year smoking history. She has never used smokeless tobacco.      Appropriate preventive services were discussed with this patient, including applicable screening as appropriate for cardiovascular disease, diabetes, osteopenia/osteoporosis, and glaucoma.  As appropriate for age/gender, discussed screening for colorectal cancer, prostate cancer, breast cancer, and cervical cancer. Checklist reviewing preventive services available has been given to the patient.    Reviewed patients plan of care and provided an AVS. The Basic Care Plan (routine screening as documented in Health Maintenance) for Marina meets the Care Plan requirement. This Care Plan has been established and reviewed with the Patient.    Counseling Resources:  ATP IV Guidelines  Pooled Cohorts Equation Calculator  Breast Cancer Risk Calculator  FRAX Risk Assessment  ICSI Preventive Guidelines  Dietary Guidelines for Americans, 2010  USDA's MyPlate  ASA Prophylaxis  Lung CA Screening    Cody Obando MD  [facility]    Results for orders placed or performed in visit on 08/27/19   TSH with free T4 reflex   Result Value Ref Range    TSH 2.09 0.40 - 4.00 mU/L   Lipid Profile (Chol, Trig, HDL, LDL calc)   Result Value Ref Range    Cholesterol 149 <200 mg/dL    Triglycerides 179 (H) <150 mg/dL    HDL Cholesterol 41 (L) >49 mg/dL    LDL Cholesterol Calculated 72 <100 mg/dL    Non HDL Cholesterol 108 <130 mg/dL   ALT   Result Value Ref Range    ALT 21 0 - 50 U/L     My chart message sent.                    The cholesterol numbers are good.  The thyroid level is also good. Keep taking the same dosage.

## 2019-08-28 LAB
ALT SERPL W P-5'-P-CCNC: 21 U/L (ref 0–50)
CHOLEST SERPL-MCNC: 149 MG/DL
HDLC SERPL-MCNC: 41 MG/DL
LDLC SERPL CALC-MCNC: 72 MG/DL
NONHDLC SERPL-MCNC: 108 MG/DL
TRIGL SERPL-MCNC: 179 MG/DL
TSH SERPL DL<=0.005 MIU/L-ACNC: 2.09 MU/L (ref 0.4–4)

## 2019-09-04 DIAGNOSIS — E78.5 HYPERLIPIDEMIA LDL GOAL <100: ICD-10-CM

## 2019-09-04 NOTE — TELEPHONE ENCOUNTER
"Requested Prescriptions   Pending Prescriptions Disp Refills     simvastatin (ZOCOR) 40 MG tablet [Pharmacy Med Name: SIMVASTATIN 40 MG Tablet]  Last Written Prescription Date:  8/21/2018  Last Fill Quantity: 90 tablet,  # refills: 3   Last office visit: 8/27/2019 with prescribing provider:  Gisella   Future Office Visit:     90 tablet 3     Sig: TAKE 1 TABLET EVERY DAY       Statins Protocol Passed - 9/4/2019  3:24 PM        Passed - LDL on file in past 12 months     Recent Labs   Lab Test 08/27/19  1036   LDL 72             Passed - No abnormal creatine kinase in past 12 months     No lab results found.             Passed - Recent (12 mo) or future (30 days) visit within the authorizing provider's specialty     Patient had office visit in the last 12 months or has a visit in the next 30 days with authorizing provider or within the authorizing provider's specialty.  See \"Patient Info\" tab in inbasket, or \"Choose Columns\" in Meds & Orders section of the refill encounter.              Passed - Medication is active on med list        Passed - Patient is age 18 or older        Passed - No active pregnancy on record        Passed - No positive pregnancy test in past 12 months           "

## 2019-09-05 RX ORDER — SIMVASTATIN 40 MG
TABLET ORAL
Qty: 90 TABLET | Refills: 2 | Status: SHIPPED | OUTPATIENT
Start: 2019-09-05 | End: 2020-05-01

## 2019-09-09 ENCOUNTER — TRANSFERRED RECORDS (OUTPATIENT)
Dept: HEALTH INFORMATION MANAGEMENT | Facility: CLINIC | Age: 79
End: 2019-09-09

## 2019-09-16 DIAGNOSIS — E79.0 HYPERURICEMIA: ICD-10-CM

## 2019-09-16 DIAGNOSIS — M10.9 PODAGRA: ICD-10-CM

## 2019-09-16 NOTE — TELEPHONE ENCOUNTER
"Requested Prescriptions   Pending Prescriptions Disp Refills     allopurinol (ZYLOPRIM) 100 MG tablet [Pharmacy Med Name: ALLOPURINOL 100 MG Tablet]  Last Written Prescription Date:  2/11/2019  Last Fill Quantity: 135 tablet,  # refills: 2   Last office visit: 8/27/2019 with prescribing provider:  Gisella   Future Office Visit:     135 tablet 2     Sig: TAKE 1 AND 1/2 TABLETS EVERY DAY       Gout Agents Protocol Failed - 9/16/2019  4:46 PM        Failed - Has Uric Acid on file in past 12 months and value is less than 6     Recent Labs   Lab Test 10/29/18  1128   URIC 7.1*     If level is 6mg/dL or greater, ok to refill one time and refer to provider.           Failed - Normal serum creatinine on file in the past 12 months     Recent Labs   Lab Test 08/19/19   CR 2.36*             Passed - CBC on file in past 12 months     Recent Labs   Lab Test 10/18/18  0605 10/16/18  0859   WBC  --  8.4   RBC  --  3.58*   HGB  --  11.2*   HCT  --  34.4*    204                 Passed - ALT on file in past 12 months     Recent Labs   Lab Test 08/27/19  1036   ALT 21             Passed - Recent (12 mo) or future (30 days) visit within the authorizing provider's specialty     Patient had office visit in the last 12 months or has a visit in the next 30 days with authorizing provider or within the authorizing provider's specialty.  See \"Patient Info\" tab in inbasket, or \"Choose Columns\" in Meds & Orders section of the refill encounter.              Passed - Medication is active on med list        Passed - Patient is age 18 or older        Passed - No active pregnancy on record        Passed - No positive pregnancy test in past year           "

## 2019-09-17 RX ORDER — ALLOPURINOL 100 MG/1
TABLET ORAL
Qty: 135 TABLET | Refills: 2 | OUTPATIENT
Start: 2019-09-17

## 2019-09-28 ENCOUNTER — HEALTH MAINTENANCE LETTER (OUTPATIENT)
Age: 79
End: 2019-09-28

## 2019-10-18 DIAGNOSIS — I10 HYPERTENSION GOAL BP (BLOOD PRESSURE) < 140/90: ICD-10-CM

## 2019-10-19 NOTE — TELEPHONE ENCOUNTER
"Requested Prescriptions   Pending Prescriptions Disp Refills     metoprolol succinate ER (TOPROL-XL) 25 MG 24 hr tablet [Pharmacy Med Name: METOPROLOL SUCCINATE ER 25 MG Tablet Extended Release 24 Hour]    Last Written Prescription Date:  08/21/2018  Last Fill Quantity: 90 tablet,  # refills: 3   Last office visit: 8/27/2019 with prescribing provider:  Gisella   Future Office Visit:     90 tablet 3     Sig: TAKE 1 TABLET EVERY DAY       Beta-Blockers Protocol Passed - 10/18/2019  8:28 PM        Passed - Blood pressure under 140/90 in past 12 months     BP Readings from Last 3 Encounters:   08/27/19 118/70   10/29/18 130/78   10/19/18 100/52                 Passed - Patient is age 6 or older        Passed - Recent (12 mo) or future (30 days) visit within the authorizing provider's specialty     Patient has had an office visit with the authorizing provider or a provider within the authorizing providers department within the previous 12 mos or has a future within next 30 days. See \"Patient Info\" tab in inbasket, or \"Choose Columns\" in Meds & Orders section of the refill encounter.              Passed - Medication is active on med list           "

## 2019-10-21 RX ORDER — METOPROLOL SUCCINATE 25 MG/1
TABLET, EXTENDED RELEASE ORAL
Qty: 90 TABLET | Refills: 3 | Status: SHIPPED | OUTPATIENT
Start: 2019-10-21 | End: 2020-08-26

## 2019-11-13 ENCOUNTER — TRANSFERRED RECORDS (OUTPATIENT)
Dept: HEALTH INFORMATION MANAGEMENT | Facility: CLINIC | Age: 79
End: 2019-11-13

## 2019-11-21 DIAGNOSIS — L30.9 DERMATITIS: Primary | ICD-10-CM

## 2019-11-21 RX ORDER — SILVER SULFADIAZINE 10 MG/G
CREAM TOPICAL DAILY
Qty: 50 G | Refills: 1 | Status: SHIPPED | OUTPATIENT
Start: 2019-11-21

## 2019-11-21 NOTE — TELEPHONE ENCOUNTER
Called patient regarding refill request.  Patient states she is currently experiencing a shingles outbreak and would like a refill of this medication.  Patient stated she does not want to oral medication.    Routing to provider for review and advise as appropriate.    JOHN Mcintosh, RN  [employer] Triage

## 2019-11-21 NOTE — TELEPHONE ENCOUNTER
silver sulfADIAZINE (SILVADENE) 1 % external cream     Discontinued.        Last Written Prescription Date:  11/14/2013 END 11/21/2013  Last Fill Quantity: 85 g,  # refills: 1   Last office visit: 8/27/2019 with prescribing provider:  Gisella   Future Office Visit:         Routing refill request to provider for review/approval because:  Drug not on the [facility], [facility] or [facility] refill protocol or controlled substance

## 2019-11-25 ENCOUNTER — TELEPHONE (OUTPATIENT)
Dept: FAMILY MEDICINE | Facility: CLINIC | Age: 79
End: 2019-11-25

## 2019-11-25 NOTE — TELEPHONE ENCOUNTER
patient reports sulfa antibiotic allergy and there is a potential for cross- sensitivity.  Please confirm if ok to fill this med.

## 2019-12-19 ENCOUNTER — TRANSFERRED RECORDS (OUTPATIENT)
Dept: HEALTH INFORMATION MANAGEMENT | Facility: CLINIC | Age: 79
End: 2019-12-19

## 2019-12-27 DIAGNOSIS — J43.8 OTHER EMPHYSEMA (H): ICD-10-CM

## 2019-12-27 RX ORDER — ALBUTEROL SULFATE 90 UG/1
2 AEROSOL, METERED RESPIRATORY (INHALATION) EVERY 6 HOURS PRN
Qty: 1 INHALER | Refills: 1 | Status: SHIPPED | OUTPATIENT
Start: 2019-12-27 | End: 2019-12-27

## 2019-12-27 RX ORDER — ALBUTEROL SULFATE 90 UG/1
2 AEROSOL, METERED RESPIRATORY (INHALATION) EVERY 6 HOURS PRN
Qty: 1 INHALER | Refills: 11 | Status: SHIPPED | OUTPATIENT
Start: 2019-12-27 | End: 2021-01-12

## 2019-12-27 NOTE — TELEPHONE ENCOUNTER
Routing refill request to provider for review/approval because:  Dx not covered under Refill protocol

## 2019-12-27 NOTE — TELEPHONE ENCOUNTER
Per [facility] referral request:   Dr Obando      I need a new prescription for albuterol sulfate sent into [facility].   I hope you get this msg.   Nathalia Ortiz and Happy New Years      Marina Harrison    36     Pended order, routing to refill pool.

## 2019-12-27 NOTE — TELEPHONE ENCOUNTER
"Requested Prescriptions   Pending Prescriptions Disp Refills     albuterol (PROAIR HFA/PROVENTIL HFA/VENTOLIN HFA) 108 (90 Base) MCG/ACT inhaler  Last Written Prescription Date:  8/21/2018  Last Fill Quantity: 1 Inhaler,  # refills: 5   Last office visit: 8/27/2019 with prescribing provider:  Gisella   Future Office Visit:     1 Inhaler 5     Sig: Inhale 2 puffs into the lungs every 6 hours as needed for shortness of breath / dyspnea or wheezing       Asthma Maintenance Inhalers - Anticholinergics Passed - 12/27/2019  9:50 AM        Passed - Patient is age 12 years or older        Passed - Recent (12 mo) or future (30 days) visit within the authorizing provider's specialty     Patient has had an office visit with the authorizing provider or a provider within the authorizing providers department within the previous 12 mos or has a future within next 30 days. See \"Patient Info\" tab in inbasket, or \"Choose Columns\" in Meds & Orders section of the refill encounter.              Passed - Medication is active on med list           "

## 2020-01-07 DIAGNOSIS — B02.9 HERPES ZOSTER WITHOUT COMPLICATION: ICD-10-CM

## 2020-01-07 NOTE — TELEPHONE ENCOUNTER
"Requested Prescriptions   Pending Prescriptions Disp Refills     famciclovir (FAMVIR) 500 MG tablet [Pharmacy Med Name: FAMCICLOVIR 500 MG Tablet]  Last Written Prescription Date:  9/23/2019  Last Fill Quantity: 14 tablet,  # refills: 3   Last office visit: 8/27/2019 with prescribing provider:  Gisella   Future Office Visit:     14 tablet 3     Sig: TAKE 1 TABLET (500 MG) BY MOUTH 2 TIMES DAILY       Antivirals for Herpes Protocol Passed - 1/7/2020  9:28 AM        Passed - Patient is age 12 or older        Passed - Recent (12 mo) or future (30 days) visit within the authorizing provider's specialty     Patient has had an office visit with the authorizing provider or a provider within the authorizing providers department within the previous 12 mos or has a future within next 30 days. See \"Patient Info\" tab in inbasket, or \"Choose Columns\" in Meds & Orders section of the refill encounter.              Passed - Medication is active on med list           "

## 2020-01-08 RX ORDER — FAMCICLOVIR 500 MG/1
500 TABLET ORAL 2 TIMES DAILY
Qty: 14 TABLET | Refills: 3 | Status: SHIPPED | OUTPATIENT
Start: 2020-01-08 | End: 2020-10-15

## 2020-02-12 ENCOUNTER — TRANSFERRED RECORDS (OUTPATIENT)
Dept: HEALTH INFORMATION MANAGEMENT | Facility: CLINIC | Age: 80
End: 2020-02-12

## 2020-03-16 DIAGNOSIS — I73.9 PERIPHERAL VASCULAR DISEASE (H): ICD-10-CM

## 2020-03-17 NOTE — TELEPHONE ENCOUNTER
"Requested Prescriptions   Pending Prescriptions Disp Refills     clopidogrel (PLAVIX) 75 MG tablet [Pharmacy Med Name: CLOPIDOGREL 75 MG Tablet]  Last Written Prescription Date:  3/18/2019  Last Fill Quantity: 90 tablet,  # refills: 4   Last office visit: 8/27/2019 with prescribing provider:  Gisella   Future Office Visit:     90 tablet 4     Sig: TAKE 1 TABLET EVERY DAY       Plavix Failed - 3/16/2020  6:06 PM        Failed - Normal HGB on file in past 12 months     Recent Labs   Lab Test 10/16/18  0859   HGB 11.2*               Failed - Normal Platelets on file in past 12 months     Recent Labs   Lab Test 10/18/18  0605                  Passed - No active PPI on record unless is Protonix        Passed - Recent (12 mo) or future (30 days) visit within the authorizing provider's specialty     Patient has had an office visit with the authorizing provider or a provider within the authorizing providers department within the previous 12 mos or has a future within next 30 days. See \"Patient Info\" tab in inbasket, or \"Choose Columns\" in Meds & Orders section of the refill encounter.              Passed - Medication is active on med list        Passed - Patient is age 18 or older        Passed - No active pregnancy on record        Passed - No positive pregnancy test in past 12 months              "

## 2020-03-18 RX ORDER — CLOPIDOGREL BISULFATE 75 MG/1
TABLET ORAL
Qty: 90 TABLET | Refills: 0 | Status: SHIPPED | OUTPATIENT
Start: 2020-03-18 | End: 2020-03-19

## 2020-03-18 NOTE — TELEPHONE ENCOUNTER
She can wait for an office visit and labs until this summer.       Please refill her clopidogrel.

## 2020-03-18 NOTE — TELEPHONE ENCOUNTER
Pt was seen 08/27/19 for a physical.        Does pt just need labs done then?    Is this pt high risk with her age? Should this pt come in for labs or should she wait a few months?

## 2020-03-19 RX ORDER — CLOPIDOGREL BISULFATE 75 MG/1
75 TABLET ORAL DAILY
Qty: 90 TABLET | Refills: 3 | Status: SHIPPED | OUTPATIENT
Start: 2020-03-19 | End: 2020-12-29

## 2020-04-20 DIAGNOSIS — M19.041 PRIMARY OSTEOARTHRITIS OF BOTH HANDS: ICD-10-CM

## 2020-04-20 DIAGNOSIS — M19.042 PRIMARY OSTEOARTHRITIS OF BOTH HANDS: ICD-10-CM

## 2020-04-20 NOTE — TELEPHONE ENCOUNTER
"Requested Prescriptions   Pending Prescriptions Disp Refills     diclofenac (VOLTAREN) 1 % topical gel [Pharmacy Med Name: DICLOFENAC SODIUM 1 % Gel] 100 g 5     Sig: APPLY 2 GRAMS TO HANDS FOUR TIMES DAILY AS NEEDED ,USING ENCLOSED DOSING CARD.       Topical Steroids and Nonsteroidals Protocol Passed - 4/20/2020  9:21 AM        Passed - Patient is age 6 or older        Passed - Authorizing prescriber's most recent note related to this medication read.     If refill request is for ophthalmic use, please forward request to provider for approval.          Passed - High potency steroid not ordered        Passed - Recent (12 mo) or future (30 days) visit within the authorizing provider's specialty     Patient has had an office visit with the authorizing provider or a provider within the authorizing providers department within the previous 12 mos or has a future within next 30 days. See \"Patient Info\" tab in inbasket, or \"Choose Columns\" in Meds & Orders section of the refill encounter.              Passed - Medication is active on med list             "

## 2020-05-01 DIAGNOSIS — E78.5 HYPERLIPIDEMIA LDL GOAL <100: ICD-10-CM

## 2020-05-01 RX ORDER — SIMVASTATIN 40 MG
TABLET ORAL
Qty: 90 TABLET | Refills: 1 | Status: SHIPPED | OUTPATIENT
Start: 2020-05-01 | End: 2020-11-23

## 2020-06-19 DIAGNOSIS — E03.9 HYPOTHYROIDISM, UNSPECIFIED TYPE: ICD-10-CM

## 2020-06-21 RX ORDER — LEVOTHYROXINE SODIUM 125 UG/1
TABLET ORAL
Qty: 85 TABLET | Refills: 3 | Status: SHIPPED | OUTPATIENT
Start: 2020-06-21

## 2020-06-29 ENCOUNTER — TRANSFERRED RECORDS (OUTPATIENT)
Dept: HEALTH INFORMATION MANAGEMENT | Facility: CLINIC | Age: 80
End: 2020-06-29

## 2020-07-30 ENCOUNTER — TRANSFERRED RECORDS (OUTPATIENT)
Dept: HEALTH INFORMATION MANAGEMENT | Facility: CLINIC | Age: 80
End: 2020-07-30

## 2020-07-30 ENCOUNTER — TELEPHONE (OUTPATIENT)
Dept: FAMILY MEDICINE | Facility: CLINIC | Age: 80
End: 2020-07-30

## 2020-07-30 LAB
CREAT SERPL-MCNC: 2.39 MG/DL (ref 0.57–1.11)
GFR SERPL CREATININE-BSD FRML MDRD: 19 ML/MIN/1.73M2
GLUCOSE SERPL-MCNC: 100 MG/DL (ref 70–95)
POTASSIUM SERPL-SCNC: 4 MMOL/L (ref 3.5–5.1)

## 2020-08-18 NOTE — TELEPHONE ENCOUNTER
Prescription approved per [facility], [facility] or [facility] refill protocol.  Please see High alert drug interaction   Heather De Jesus RN  Triage [employer]      
Simvastatin 40 mg     Last Written Prescription Date: 9/30/16  Last Fill Quantity: 90, # refills: 2  Last Office Visit with [facility], [facility] or [facility] prescribing provider: 8/30/16  Next 5 appointments (look out 90 days)     Jul 11, 2017 11:00 AM CDT   [facility]    [address] [place] [id number]   [phone]                   Lab Results   Component Value Date    CHOL 149 06/28/2016     Lab Results   Component Value Date    HDL 49 06/28/2016     Lab Results   Component Value Date    LDL 73 06/28/2016     Lab Results   Component Value Date    TRIG 137 06/28/2016     Lab Results   Component Value Date    CHOLHDLRATIO 2.7 06/23/2015       
high school

## 2020-08-26 DIAGNOSIS — I10 HYPERTENSION GOAL BP (BLOOD PRESSURE) < 140/90: ICD-10-CM

## 2020-08-26 RX ORDER — METOPROLOL SUCCINATE 25 MG/1
TABLET, EXTENDED RELEASE ORAL
Qty: 90 TABLET | Refills: 0 | Status: SHIPPED | OUTPATIENT
Start: 2020-08-26 | End: 2020-11-11

## 2020-08-26 NOTE — TELEPHONE ENCOUNTER
Routing refill request to provider for review/approval because:  Labs not current:  BP    Failed BP protocol- prior Bps have been under 140/90

## 2020-09-02 ENCOUNTER — TRANSFERRED RECORDS (OUTPATIENT)
Dept: HEALTH INFORMATION MANAGEMENT | Facility: CLINIC | Age: 80
End: 2020-09-02

## 2020-09-08 DIAGNOSIS — Z11.59 ENCOUNTER FOR SCREENING FOR OTHER VIRAL DISEASES: Primary | ICD-10-CM

## 2020-09-10 ENCOUNTER — TELEPHONE (OUTPATIENT)
Dept: INTERVENTIONAL RADIOLOGY/VASCULAR | Facility: CLINIC | Age: 80
End: 2020-09-10

## 2020-09-10 NOTE — TELEPHONE ENCOUNTER
Per Dr. Azul CT lung biopsy request is not approved, ok to proceed with thoracentesis. Scheduling notified and will contact patient.

## 2020-09-14 ENCOUNTER — TELEPHONE (OUTPATIENT)
Dept: MEDSURG UNIT | Facility: CLINIC | Age: 80
End: 2020-09-14

## 2020-09-14 NOTE — TELEPHONE ENCOUNTER
Pre-Procedure Negative COVID Test     Test Scanned into [employer] (Done at [facility] in [place] on 9/11/20)    Step 1 Patient Notification  Patient notified of the negative COVID test result    Step 2 Patient Information  Verified the patient remains symptom free   Patient informed to contact the ordering provider if any of the symptoms develop prior to the procedure    Fever/Chills   Cough   Shortness of breath   New loss of taste or smell  Sore throat  Muscle or body aches  Headaches  Fatigue  Vomiting or diarrhea    Step 3 Review Visitor Policy  Patient informed of the updated visitor policy     1 visitor allowed per patient   Visitor must screen negative for COVID symptoms   Visitor must wear a mask  Waiting rooms continue to be closed to visitors    Karmen Casey RN

## 2020-09-15 ENCOUNTER — HOSPITAL ENCOUNTER (OUTPATIENT)
Facility: CLINIC | Age: 80
Discharge: HOME | End: 2020-09-15
Admitting: RADIOLOGY
Payer: MEDICARE

## 2020-09-15 ENCOUNTER — HOSPITAL ENCOUNTER (OUTPATIENT)
Dept: ULTRASOUND IMAGING | Facility: CLINIC | Age: 80
End: 2020-09-15
Attending: THORACIC SURGERY (CARDIOTHORACIC VASCULAR SURGERY)
Payer: MEDICARE

## 2020-09-15 ENCOUNTER — OFFICE VISIT (OUTPATIENT)
Dept: FAMILY MEDICINE | Facility: CLINIC | Age: 80
End: 2020-09-15
Payer: MEDICARE

## 2020-09-15 VITALS
OXYGEN SATURATION: 96 % | HEIGHT: 65 IN | TEMPERATURE: 97.4 F | SYSTOLIC BLOOD PRESSURE: 118 MMHG | BODY MASS INDEX: 29.49 KG/M2 | WEIGHT: 177 LBS | RESPIRATION RATE: 20 BRPM | DIASTOLIC BLOOD PRESSURE: 74 MMHG | HEART RATE: 75 BPM

## 2020-09-15 VITALS
WEIGHT: 177 LBS | BODY MASS INDEX: 29.49 KG/M2 | TEMPERATURE: 97 F | HEART RATE: 76 BPM | RESPIRATION RATE: 18 BRPM | OXYGEN SATURATION: 99 % | DIASTOLIC BLOOD PRESSURE: 74 MMHG | SYSTOLIC BLOOD PRESSURE: 142 MMHG | HEIGHT: 65 IN

## 2020-09-15 DIAGNOSIS — Z85.118 HISTORY OF LUNG CANCER: ICD-10-CM

## 2020-09-15 DIAGNOSIS — E03.9 HYPOTHYROIDISM, UNSPECIFIED TYPE: ICD-10-CM

## 2020-09-15 DIAGNOSIS — Z00.00 ENCOUNTER FOR MEDICARE ANNUAL WELLNESS EXAM: Primary | ICD-10-CM

## 2020-09-15 DIAGNOSIS — E79.0 HYPERURICEMIA: ICD-10-CM

## 2020-09-15 DIAGNOSIS — C34.90 NON-SMALL CELL LUNG CANCER (H): ICD-10-CM

## 2020-09-15 DIAGNOSIS — R93.89 ABNORMAL CT OF THE CHEST: ICD-10-CM

## 2020-09-15 DIAGNOSIS — N18.4 CHRONIC RENAL IMPAIRMENT, STAGE 4 (SEVERE) (H): ICD-10-CM

## 2020-09-15 LAB
BASOPHILS # BLD AUTO: 0 10E9/L (ref 0–0.2)
BASOPHILS NFR BLD AUTO: 0.6 %
DIFFERENTIAL METHOD BLD: ABNORMAL
EOSINOPHIL # BLD AUTO: 0.3 10E9/L (ref 0–0.7)
EOSINOPHIL NFR BLD AUTO: 4.3 %
ERYTHROCYTE [DISTWIDTH] IN BLOOD BY AUTOMATED COUNT: 13.5 % (ref 10–15)
GLUCOSE FLD-MCNC: 102 MG/DL
GRAM STN SPEC: NORMAL
GRAM STN SPEC: NORMAL
HCT VFR BLD AUTO: 35.5 % (ref 35–47)
HGB BLD-MCNC: 11.3 G/DL (ref 11.7–15.7)
INR PPP: 1.15 (ref 0.86–1.14)
LDH FLD L TO P-CCNC: 583 U/L
LYMPHOCYTES # BLD AUTO: 1.2 10E9/L (ref 0.8–5.3)
LYMPHOCYTES NFR BLD AUTO: 17.4 %
MCH RBC QN AUTO: 31 PG (ref 26.5–33)
MCHC RBC AUTO-ENTMCNC: 31.8 G/DL (ref 31.5–36.5)
MCV RBC AUTO: 98 FL (ref 78–100)
MONOCYTES # BLD AUTO: 0.7 10E9/L (ref 0–1.3)
MONOCYTES NFR BLD AUTO: 9.8 %
NEUTROPHILS # BLD AUTO: 4.7 10E9/L (ref 1.6–8.3)
NEUTROPHILS NFR BLD AUTO: 67.9 %
PH FLD: 8.5 PH
PLATELET # BLD AUTO: 241 10E9/L (ref 150–450)
PROT FLD-MCNC: 5.4 G/DL
RBC # BLD AUTO: 3.64 10E12/L (ref 3.8–5.2)
SPECIMEN SOURCE FLD: NORMAL
SPECIMEN SOURCE: NORMAL
WBC # BLD AUTO: 7 10E9/L (ref 4–11)

## 2020-09-15 PROCEDURE — 83615 LACTATE (LD) (LDH) ENZYME: CPT

## 2020-09-15 PROCEDURE — 88112 CYTOPATH CELL ENHANCE TECH: CPT | Mod: 26

## 2020-09-15 PROCEDURE — 88341 IMHCHEM/IMCYTCHM EA ADD ANTB: CPT | Mod: XU

## 2020-09-15 PROCEDURE — 82945 GLUCOSE OTHER FLUID: CPT

## 2020-09-15 PROCEDURE — 88360 TUMOR IMMUNOHISTOCHEM/MANUAL: CPT

## 2020-09-15 PROCEDURE — 00000102 ZZHCL STATISTIC CYTO WRIGHT STAIN TC

## 2020-09-15 PROCEDURE — 87205 SMEAR GRAM STAIN: CPT

## 2020-09-15 PROCEDURE — 99213 OFFICE O/P EST LOW 20 MIN: CPT | Mod: 25 | Performed by: INTERNAL MEDICINE

## 2020-09-15 PROCEDURE — 80069 RENAL FUNCTION PANEL: CPT | Performed by: INTERNAL MEDICINE

## 2020-09-15 PROCEDURE — 84550 ASSAY OF BLOOD/URIC ACID: CPT | Performed by: INTERNAL MEDICINE

## 2020-09-15 PROCEDURE — 00000155 ZZHCL STATISTIC H-CELL BLOCK W/STAIN

## 2020-09-15 PROCEDURE — 36415 COLL VENOUS BLD VENIPUNCTURE: CPT | Performed by: PHYSICIAN ASSISTANT

## 2020-09-15 PROCEDURE — 85610 PROTHROMBIN TIME: CPT | Performed by: PHYSICIAN ASSISTANT

## 2020-09-15 PROCEDURE — 90662 IIV NO PRSV INCREASED AG IM: CPT | Performed by: INTERNAL MEDICINE

## 2020-09-15 PROCEDURE — 36415 COLL VENOUS BLD VENIPUNCTURE: CPT | Performed by: INTERNAL MEDICINE

## 2020-09-15 PROCEDURE — 88360 TUMOR IMMUNOHISTOCHEM/MANUAL: CPT | Mod: 26

## 2020-09-15 PROCEDURE — 88305 TISSUE EXAM BY PATHOLOGIST: CPT

## 2020-09-15 PROCEDURE — 88341 IMHCHEM/IMCYTCHM EA ADD ANTB: CPT | Mod: 26

## 2020-09-15 PROCEDURE — G0008 ADMIN INFLUENZA VIRUS VAC: HCPCS | Performed by: INTERNAL MEDICINE

## 2020-09-15 PROCEDURE — 87070 CULTURE OTHR SPECIMN AEROBIC: CPT

## 2020-09-15 PROCEDURE — 00000159 ZZHCL STATISTIC H-SEND OUTS PREP

## 2020-09-15 PROCEDURE — 83986 ASSAY PH BODY FLUID NOS: CPT

## 2020-09-15 PROCEDURE — 88342 IMHCHEM/IMCYTCHM 1ST ANTB: CPT | Mod: XU

## 2020-09-15 PROCEDURE — 85025 COMPLETE CBC W/AUTO DIFF WBC: CPT | Performed by: INTERNAL MEDICINE

## 2020-09-15 PROCEDURE — 88112 CYTOPATH CELL ENHANCE TECH: CPT

## 2020-09-15 PROCEDURE — 84157 ASSAY OF PROTEIN OTHER: CPT

## 2020-09-15 PROCEDURE — G0439 PPPS, SUBSEQ VISIT: HCPCS | Performed by: INTERNAL MEDICINE

## 2020-09-15 PROCEDURE — 88305 TISSUE EXAM BY PATHOLOGIST: CPT | Mod: 26

## 2020-09-15 PROCEDURE — 40000863 ZZH STATISTIC RADIOLOGY XRAY, US, CT, MAR, NM

## 2020-09-15 PROCEDURE — 84443 ASSAY THYROID STIM HORMONE: CPT | Performed by: INTERNAL MEDICINE

## 2020-09-15 PROCEDURE — 32555 ASPIRATE PLEURA W/ IMAGING: CPT

## 2020-09-15 PROCEDURE — 87075 CULTR BACTERIA EXCEPT BLOOD: CPT

## 2020-09-15 PROCEDURE — 88342 IMHCHEM/IMCYTCHM 1ST ANTB: CPT | Mod: 26,XU

## 2020-09-15 RX ORDER — NICOTINE POLACRILEX 4 MG
15-30 LOZENGE BUCCAL
Status: DISCONTINUED | OUTPATIENT
Start: 2020-09-15 | End: 2020-09-15 | Stop reason: HOSPADM

## 2020-09-15 RX ORDER — DEXTROSE MONOHYDRATE 25 G/50ML
25-50 INJECTION, SOLUTION INTRAVENOUS
Status: DISCONTINUED | OUTPATIENT
Start: 2020-09-15 | End: 2020-09-15 | Stop reason: HOSPADM

## 2020-09-15 RX ORDER — LIDOCAINE 40 MG/G
CREAM TOPICAL
Status: DISCONTINUED | OUTPATIENT
Start: 2020-09-15 | End: 2020-09-15 | Stop reason: HOSPADM

## 2020-09-15 RX ORDER — LIDOCAINE HYDROCHLORIDE 10 MG/ML
10 INJECTION, SOLUTION EPIDURAL; INFILTRATION; INTRACAUDAL; PERINEURAL ONCE
Status: COMPLETED | OUTPATIENT
Start: 2020-09-15 | End: 2020-09-15

## 2020-09-15 RX ORDER — ERGOCALCIFEROL 1.25 MG/1
50000 CAPSULE, LIQUID FILLED ORAL
COMMUNITY
Start: 2020-09-15

## 2020-09-15 RX ADMIN — LIDOCAINE HYDROCHLORIDE 10 ML: 10 INJECTION, SOLUTION EPIDURAL; INFILTRATION; INTRACAUDAL; PERINEURAL at 14:17

## 2020-09-15 ASSESSMENT — ACTIVITIES OF DAILY LIVING (ADL): CURRENT_FUNCTION: NO ASSISTANCE NEEDED

## 2020-09-15 ASSESSMENT — MIFFLIN-ST. JEOR
SCORE: 1267.71
SCORE: 1270.1

## 2020-09-15 NOTE — PROGRESS NOTES
[facility] Procedure Nursing Note    Patient Information  Name: Marina Harrison  Age: 80 year old    Procedure  Procedure: Thoracentesis  Procedure start time: 14:18  Procedure complete time: 14:30    Pt tolerated well.  VSS. 600 cc dark red fluid removed without difficulty.     Bandaid applied to site - CDI.     No CXR needed post procedure per Dr. Baez.    Pt back to [facility]. Will monitor and discharge to home when patient meets criteria.

## 2020-09-15 NOTE — PROGRESS NOTES
[facility] Admission Nursing Note    Patient Information  Name: Marina Harrison  Age: 80 year old  Reason for admission: Thoracentesis  [facility] arrival time: 11:50 am    Patient Admission/Assessment   Pre-procedure assessment complete: Yes  If abnormal assessment/labs, provider notified: N/A  NPO: N/A  Medications held per instructions/orders: Yes  Consent: deferred  Patient oriented to room: Yes  Education/questions answered: Yes  Plan/other: Thoracentesis at 14:00    Discharge Planning  Discharge name: By self  Discharge location: home

## 2020-09-15 NOTE — NURSING NOTE
Prior to immunization administration, verified patients identity using patient s name and date of birth. Please see Immunization Activity for additional information.     Screening Questionnaire for Adult Immunization    Are you sick today?   No   Do you have allergies to medications, food, a vaccine component or latex?   No   Have you ever had a serious reaction after receiving a vaccination?   No   Do you have a long-term health problem with heart, lung, kidney, or metabolic disease (e.g., diabetes), asthma, a blood disorder, no spleen, complement component deficiency, a cochlear implant, or a spinal fluid leak?  Are you on long-term aspirin therapy?   No   Do you have cancer, leukemia, HIV/AIDS, or any other immune system problem?   No   Do you have a parent, brother, or sister with an immune system problem?   No   In the past 3 months, have you taken medications that affect  your immune system, such as prednisone, other steroids, or anticancer drugs; drugs for the treatment of rheumatoid arthritis, Crohn s disease, or psoriasis; or have you had radiation treatments?   No   Have you had a seizure, or a brain or other nervous system problem?   No   During the past year, have you received a transfusion of blood or blood    products, or been given immune (gamma) globulin or antiviral drug?   No   For women: Are you pregnant or is there a chance you could become       pregnant during the next month?   No   Have you received any vaccinations in the past 4 weeks?   No     Immunization questionnaire answers were all negative.        Per orders of Dr. Obando, injection of Fluzone - High Dose given by Dulce Briones MA. Patient instructed to remain in clinic for 15 minutes afterwards, and to report any adverse reaction to me immediately.       Screening performed by Dulce Briones MA on 9/15/2020 at 11:17 AM.  Dulce Briones MA on 9/15/2020 at 11:17 AM

## 2020-09-15 NOTE — PATIENT INSTRUCTIONS
I will let you know your lab results.     Best wishes!

## 2020-09-15 NOTE — PROGRESS NOTES
[facility] Discharge Nursing Note    Patient Information  Name: Marina Harrison  Age: 80 year old    Discharge Education:  Discharge instructions reviewed: Yes  Patient/patient representative verbalizes understanding: Yes  Patient discharging on new medications: No  Medication education completed: Yes    Discharge Plans:   Discharge location: home  Discharge ride contacted: N/A. Driving self  Approximate discharge time: 15:06    Discharge Criteria:  Discharge criteria met and vital signs stable: Yes    Patient Belongs:  Patient belongings returned to patient: Yes

## 2020-09-15 NOTE — PROGRESS NOTES
PATIENT/VISITOR WELLNESS SCREENING    Step 1 Patient Screening    1. In the last month, have you been in contact with someone who was confirmed or suspected to have Coronavirus/COVID-19? No    2. Do you have the following symptoms?  Fever/Chills? No   Cough? No   Shortness of breath? No   New loss of taste or smell? No  Sore throat? No  Muscle or body aches? No  Headaches? No  Fatigue? No  Vomiting or diarrhea? No    Step 2 Visitor Screening    1. Name of Visitor (1 visitor per patient): No visitor    Step 3 Refer to logic grid below for actions    NO SYMPTOM(S)    ACTIONS:  1. Standard rooming process  2. Provider to assess per normal protocol  3. Implement precautions as needed and per guidelines

## 2020-09-15 NOTE — DISCHARGE INSTRUCTIONS
Thoracentesis Discharge Instructions     After you go home:      You may resume your normal diet    Care of Puncture Site:      For the first 48 hrs, check your puncture site every couple hours while you are awake     If there is a bandaid - you may remove it tomorrow morning    You may shower tomorrow    No tub baths, whirlpools or swimming until your puncture site has fully healed     Activity:      You may go back to normal activity in 24 hours    Wait 48 hours before lifting, straining, exercise or other strenuous activity    Medicines:      You may resume all your medications    For minor pain, you may take Acetaminophen (Tylenol) or Ibuprofen (Advil)            Call the provider who ordered this procedure if:      The site is red, swollen, hot or tender    Blood or fluid is draining from the site    You have chills or a fever greater than 101 F (38 C)    Shortness of breath    Pain that is getting worse    Any questions or concerns      Additional Information:      During this test, a needle will sometimes enter the lung. This can cause the lung to collapse - called a pneumothorax. Symptoms include severe chest pain, breathing problems or increased blood in your sputum (phlegm).     Call  911 or go to the Emergency Room if:      Severe chest pain or trouble breathing    Increased blood in your sputum (phlegm)    Bleeding that you cannot control      If you have questions call:        [facility] Radiology Dept @ [phone]      The provider who performed your procedure was _________________.

## 2020-09-15 NOTE — PROGRESS NOTES
SUBJECTIVE:   Marina Harrison is a 80 year old female who presents for Preventive Visit.    Are you in the first 12 months of your Medicare coverage?  No    Healthy Habits:     In general, how would you rate your overall health?  Fair    Frequency of exercise:  2-3 days/week    Duration of exercise:  15-30 minutes    Taking medications regularly:  Yes    Barriers to taking medications:  None    Medication side effects:  None    Ability to successfully perform activities of daily living:  No assistance needed    Home Safety:  No safety concerns identified    Hearing Impairment:  No hearing concerns    In the past 6 months, have you been bothered by leaking of urine? Yes    In general, how would you rate your overall mental or emotional health?  Good      PHQ-2 Total Score: 0    Additional concerns today:  No    Do you feel safe in your environment? Yes    Have you ever done Advance Care Planning? (For example, a Health Directive, POLST, or a discussion with a medical provider or your loved ones about your wishes): Yes, advance care planning is on file.      Fall risk  Fallen 2 or more times in the past year?: No  Any fall with injury in the past year?: No    Cognitive Screening   1) Repeat 3 items (Leader, Season, Table)    2) Clock draw: NORMAL  3) 3 item recall: Recalls 3 objects  Results: NORMAL clock, 1-2 items recalled: COGNITIVE IMPAIRMENT LESS LIKELY    Mini-CogTM Copyright RUBY [employer]ail. Licensed by the author for use in [facility]; reprinted with permission (jon@.[facility]). All rights reserved.      Do you have sleep apnea, excessive snoring or daytime drowsiness?: no    Reviewed and updated as needed this visit by clinical staff  Tobacco  Allergies  Meds  Med Hx  Surg Hx  Fam Hx  Soc Hx        Reviewed and updated as needed this visit by Provider        Social History     Tobacco Use     Smoking status: Former Smoker     Packs/day: 1.50     Years: 43.00     Pack years: 64.50     Start  "date: 1955     Last attempt to quit: 5/15/1998     Years since quittin.3     Smokeless tobacco: Never Used   Substance Use Topics     Alcohol use: Yes     Comment: 1wine weekly     If you drink alcohol do you typically have >3 drinks per day or >7 drinks per week? No    Alcohol Use 9/15/2020   Prescreen: >3 drinks/day or >7 drinks/week? No   Prescreen: >3 drinks/day or >7 drinks/week? -   No flowsheet data found.        R pleuritic cp and HALL; having a thoracentesis later today.                            There have been cases of COVID-19 in her neighborhood.                                No recent gout. Taking allopurinol 150 mg per day.                                          She states she is not worried about recurrent lung cancer.           However, she states she is \"taking care of paperwork\" such as naming beneficiaries etc.   Her 2 daughters live out of state.  She has some involved  nieces who live in the [facility].  She has neighbors who help her in [place].    Current providers sharing in care for this patient include:   Patient Care Team:  Cody Obando MD as PCP - General (Internal Medicine)  Cody Obando MD as Assigned PCP    The following health maintenance items are reviewed in Epic and correct as of today:  Health Maintenance   Topic Date Due     URINE DRUG SCREEN  1940     COPD ACTION PLAN  1940     FALL RISK ASSESSMENT  2018     PHQ-2  2020     INFLUENZA VACCINE (1) 2020     MEDICARE ANNUAL WELLNESS VISIT  2020     ADVANCE CARE PLANNING  10/08/2023     DTAP/TDAP/TD IMMUNIZATION (3 - Td) 2025     DEXA  Completed     SPIROMETRY  Completed     PNEUMOCOCCAL IMMUNIZATION 65+ LOW/MEDIUM RISK  Completed     ZOSTER IMMUNIZATION  Completed     IPV IMMUNIZATION  Aged Out     MENINGITIS IMMUNIZATION  Aged Out     HEPATITIS B IMMUNIZATION  Aged Out     Patient Active Problem List    Diagnosis Date Noted     Non-small cell lung cancer (H) " "02/20/2019     Priority: High     RLL; resected; 10/18       Malignant neoplasm of upper lobe of left lung (H) 10/19/2018     Priority: High     [place]; resected 10/18;        F/u CT in 2/19 NAD ; f/u 5/19: new 7 mm nodule LLL;  CT ok per Dr Jaquez in 9/19.               See PET scan 6/20 and CT 9/20;  R pleural effusion with \"rounded atelectasis\"       Mass of upper lobe of left lung 10/16/2018     Priority: High     Abnormal CT of the chest 09/05/2018     Priority: High      Chest CT shows \"abnormal findings left apex and right posteromedial base; neoplasia is not excludable.    Consideration could be given to doing a PET/CT scan.  At the least would recommend follow-up CT in 3 months.\"       Personal history of tobacco use, presenting hazards to health 06/26/2016     Priority: High     Quit in 19_98_;            Pack yrs = more than 60.                           She declines CXR in 2018       Essential hypertension with goal blood pressure less than 130/80 01/09/2015     Priority: High     Chronic renal impairment, stage 4 (severe) (H) 06/17/2014     Priority: High     Peripheral vascular disease (H) 05/15/2013     Priority: High     MELANIE 1.1 bilat in 6/13; stable  Problem list name updated by automated process. Provider to review       Hyperlipidemia LDL goal <100 05/15/2013     Priority: High     Cor angio approx 11/09; \"minimal disease\"      Lipids were good at renal; see 6/14 note       History of lung cancer 08/25/2019     Priority: Medium     Primary osteoarthritis of both hands 01/16/2019     Priority: Medium     Post-thoracotomy pain 10/29/2018     Priority: Medium     Right lower lobe pulmonary nodule 10/02/2018     Priority: Medium     Adrenal adenoma, right 09/05/2018     Priority: Medium           Abdominal CT shows \"a large R adrenal adenoma\"; no size is given       Hyperuricemia 08/22/2018     Priority: Medium     9.6; add 100 mg allopurinol;        7.1 in 10/18,increase to 150 mg ; 6.9 in 1/19 " with renal; 6.7 in 8/19       Other specified hypothyroidism 08/21/2018     Priority: Medium     Hypothyroidism, unspecified type 08/21/2018     Priority: Medium     Right lower quadrant pain 08/21/2018     Priority: Medium     Cervical spine arthritis 07/12/2017     Priority: Medium     Chronic pain syndrome 05/17/2016     Priority: Medium     Podagra 07/14/2015     Priority: Medium     Uric acid 9.6 in 8/18; add allopurinol       Hand joint pain 04/07/2015     Priority: Medium     Bee sting reaction 06/26/2014     Priority: Medium     Osteopenia 06/17/2014     Priority: Medium     Mild; in 2009, T-1.6 R, -0.4 spine       Back pain 12/31/2012     Priority: Medium     COPD (chronic obstructive pulmonary disease) (H) 06/06/2012     Priority: Medium     Listed as a Dx in [facility] on 6/6/12       Preventive measure 05/15/2013     Priority: Low     [facility] DATA BASE UNDER THE 5/15/13 NOTE  Colonoscopy approx 2005; pt declines another         S/p bilateral mastectomy; s/p hysterectomy         Past Surgical History:   Procedure Laterality Date     AORTO BI ILIAC BYPASS Bilateral 1999     APPENDECTOMY       BUNIONECTOMY       CHOLECYSTECTOMY       ENT SURGERY      sinus surgery     EYE SURGERY Right     cataract     GYN SURGERY      D & C     HEMORRHOIDECTOMY       HYSTERECTOMY, PAP NO LONGER INDICATED  1976    no oophorectomy     JOINT REPLACEMENT, HIP RT/LT Bilateral 2005    Joint Replacement Hip RT/LT     MASTECTOMY, SIMPLE RT/LT/DARLENE Bilateral 1974    fibrocystic disease     ORTHOPEDIC SURGERY  1991    repair fracture of left leg     SPINE SURGERY  1972,1982     THORACOSCOPIC WEDGE RESECTION LUNG Right 10/2/2018    Procedure: THORACOSCOPIC WEDGE RESECTION LUNG;  RIGHT VIDEO ASSISTED THORACOSCOPY WEDGE RESECTION RIGHT, LOWER LOBE LUNG NODULE, LIMITED THORACOTOMY;  Surgeon: Levy Jaquez MD;  Location:  OR     THORACOTOMY, WEDGE RESECTION LUNG, COMBINED Left 10/16/2018    Procedure: LEFT THORACOTOMY, WEDGE  RESECTION LEFT UPPER LOBE LUNG NODULE. MEDIASTINAL LYMPH NODE DISSECTION. ;  Surgeon: Levy Jaquez MD;  Location: [facility] OR     THYROIDECTOMY       goiter       BP Readings from Last 3 Encounters:   09/15/20 118/74   08/27/19 118/70   10/29/18 130/78    Wt Readings from Last 3 Encounters:   09/15/20 80.3 kg (177 lb)   08/27/19 83 kg (183 lb)   10/29/18 81.6 kg (180 lb)                  Current Outpatient Medications   Medication Sig Dispense Refill     Acetaminophen (TYLENOL PO) Take 650 mg by mouth 3 times daily       albuterol (PROAIR HFA/PROVENTIL HFA/VENTOLIN HFA) 108 (90 Base) MCG/ACT inhaler Inhale 2 puffs into the lungs every 6 hours as needed for shortness of breath / dyspnea or wheezing 1 Inhaler 11     allopurinol (ZYLOPRIM) 100 MG tablet TAKE 1 AND 1/2 TABLETS EVERY  tablet 2     calcitRIOL (ROCALTROL) 0.25 MCG capsule Take 1 capsule by mouth every other day. 90 capsule 1     Carboxymethylcellul-Glycerin (CLEAR EYES FOR DRY EYES) 1-0.25 % SOLN Apply 1 drop to eye daily as needed        clopidogrel (PLAVIX) 75 MG tablet Take 1 tablet (75 mg) by mouth daily 90 tablet 3     EPINEPHrine (EPIPEN) 0.3 MG/0.3ML injection Inject 0.3 mLs (0.3 mg) into the muscle once as needed for anaphylaxis 1 each 11     famciclovir (FAMVIR) 500 MG tablet TAKE 1 TABLET (500 MG) BY MOUTH 2 TIMES DAILY 14 tablet 3     Furosemide (LASIX PO) Take 20 mg by mouth At Bedtime       FUROSEMIDE PO Take 40 mg by mouth every morning (2 x 20 mg = 40 mg dose)       levothyroxine (SYNTHROID/LEVOTHROID) 125 MCG tablet TAKE 1 TABLET 6 DAYS A WEEK AND TAKE 1/2 TABLET ON THE SEVENTH DAY 85 tablet 3     LEVOTHYROXINE SODIUM PO Take 62.5 mcg by mouth once a week (Takes 0.5 x 125mcg tablet = 62.5mcg dose)   On Wednesdays       metoprolol succinate ER (TOPROL-XL) 25 MG 24 hr tablet TAKE 1 TABLET EVERY DAY 90 tablet 0     multivitamin  with lutein (OCUVITE WITH LTEIN) CAPS Take 1 capsule by mouth daily       senna-docusate  "(SENOKOT-S;PERICOLACE) 8.6-50 MG per tablet Take 1-2 tablets by mouth 2 times daily as needed for constipation 30 tablet 0     silver sulfADIAZINE (SILVADENE) 1 % external cream Apply topically daily 50 g 1     simvastatin (ZOCOR) 40 MG tablet TAKE 1 TABLET EVERY DAY 90 tablet 1     spironolactone (ALDACTONE) 50 MG tablet Take 1 tablet (50 mg) by mouth daily 30 tablet 1     tiZANidine (ZANAFLEX) 2 MG tablet TAKE 1 TABLET THREE TIMES DAILY 270 tablet 3     Allergies   Allergen Reactions     Aspirin Anaphylaxis     Ciprofloxacin      See message from patient 4/19     Gabapentin      Confusion     Latex      Sulfa Drugs      Tramadol      Nausea and Vomiting       Zithromax [Azithromycin Dihydrate]      Diarrhea.      Zovirax      Mood swings. Suicidal.         Review of Systems  CONSTITUTIONAL:NEGATIVE for fever, chills, change in weight  RESP:POSITIVE for dyspnea on exertion  CV: NEGATIVE for palpitations  MUSCULOSKELETAL: POSITIVE  for neck pain    OBJECTIVE:   /74 (Patient Position: Sitting, Cuff Size: Adult Regular)   Pulse 75   Temp 97.4  F (36.3  C) (Tympanic)   Resp 20   Ht 1.657 m (5' 5.25\")   Wt 80.3 kg (177 lb)   SpO2 96%   BMI 29.23 kg/m   Estimated body mass index is 29.23 kg/m  as calculated from the following:    Height as of this encounter: 1.657 m (5' 5.25\").    Weight as of this encounter: 80.3 kg (177 lb).  Physical Exam  GENERAL APPEARANCE: alert and no distress  RESP: Dullness right posterior chest  BREAST: Status post bilateral mastectomy  CV: regular rates and rhythm, normal S1 S2, no S3 or S4 and no murmur, click or rub; pedal pulses palpable, 1+ edema with support stockings  PSYCH: mentation appears normal and affect normal/bright    Diagnostic Test Results:  Pending    ASSESSMENT / PLAN:   Marina was seen today for physical.    Diagnoses and all orders for this visit:    Encounter for Medicare annual wellness exam    Chronic renal impairment, stage 4 (severe) (H)  -     Renal " "panel (Alb, BUN, Ca, Cl, CO2, Creat, Gluc, Phos, K, Na)  -     CBC with platelets and differential    Abnormal CT of the chest    History of lung cancer    Hypothyroidism, unspecified type  -     TSH with free T4 reflex    Hyperuricemia  -     Uric acid    Other orders  -     HC FLU VACCINE, INCREASED ANTIGEN, PRESV FREE [[id number]]            Summary and implications:  We reviewed multiple issues.           We reviewed all of the issues on the diagnoses list.                     Check labs and adjust medications as indicated.           Await results of her thoracentesis.                  Patient Instructions       I will let you know your lab results.     Best wishes!                                               Return in about 6 months (around 3/15/2021) for follow up of several issues.      COUNSELING:  Reviewed preventive health counseling, as reflected in patient instructions    Estimated body mass index is 29.23 kg/m  as calculated from the following:    Height as of this encounter: 1.657 m (5' 5.25\").    Weight as of this encounter: 80.3 kg (177 lb).        She reports that she quit smoking about 22 years ago. She started smoking about 65 years ago. She has a 64.50 pack-year smoking history. She has never used smokeless tobacco.      Appropriate preventive services were discussed with this patient, including applicable screening as appropriate for cardiovascular disease, diabetes, osteopenia/osteoporosis, and glaucoma.  As appropriate for age/gender, discussed screening for colorectal cancer, prostate cancer, breast cancer, and cervical cancer. Checklist reviewing preventive services available has been given to the patient.    Reviewed patients plan of care and provided an AVS. The Basic Care Plan (routine screening as documented in Health Maintenance) for Marina meets the Care Plan requirement. This Care Plan has been established and reviewed with the Patient.    Counseling Resources:  ATP IV Guidelines  Pooled " Cohorts Equation Calculator  Breast Cancer Risk Calculator  Breast Cancer: Medication to Reduce Risk  FRAX Risk Assessment  ICSI Preventive Guidelines  Dietary Guidelines for Americans, 2010  USDA's MyPlate  ASA Prophylaxis  Lung CA Screening    Macarena Obando MD  [facility]    Results for orders placed or performed during the hospital encounter of 09/15/20   US Thoracentesis     Status: None    Narrative    EXAM:  1. RIGHT THORACENTESIS  2. ULTRASOUND GUIDANCE  LOCATION: [facility]  DATE/TIME: 9/15/2020 2:35 PM    INDICATION: Pleural effusion.    PROCEDURE: Informed consent obtained. Time out performed. The chest  was prepped and draped in a sterile fashion. 10 mL of 1% lidocaine was  infused into local soft tissues. A 5 [place] catheter system was  introduced into the pleural effusion under ultrasound guidance.    0.6 liters of clear fluid were removed and sent to lab if requested.    Patient tolerated procedure well.    Ultrasound images have been permanently captured for documentation.      Impression    IMPRESSION:  Status post ultrasound-guided right thoracentesis.    MACARENA CARVAJAL MD   Anaerobic bacterial culture     Status: None (Preliminary result)    Specimen: Pleural fluid   Result Value Ref Range    Specimen Description Pleural fluid     Special Requests Received in anaerobic tubes.     Culture Micro Culture negative monitoring continues    Fluid Culture Aerobic Bacterial     Status: None (Preliminary result)    Specimen: Pleural fluid   Result Value Ref Range    Specimen Description Pleural fluid     Culture Micro Culture negative monitoring continues    Gram stain     Status: None    Specimen: Pleural fluid   Result Value Ref Range    Specimen Description Pleural fluid     Gram Stain No organisms seen     Gram Stain       Moderate  WBC'S seen  predominantly mononuclear cells     Glucose fluid     Status: None   Result Value Ref Range    Glucose Fluid Source  Pleural fluid     Glucose Fluid 102 mg/dL   Lactate dehydrogenase fluid     Status: None   Result Value Ref Range    LD Fluid Source Pleural fluid     Lactate Dehydrogenase Fluid 583 U/L   pH fluid     Status: None   Result Value Ref Range    pH Fluid Source Pleural fluid     Ph Fluid 8.5 pH   Protein fluid     Status: None   Result Value Ref Range    Protein Total Fluid Source Pleural fluid     Protein Total Fluid 5.4 g/dL   Results for orders placed or performed during the hospital encounter of 09/15/20   INR     Status: Abnormal   Result Value Ref Range    INR 1.15 (H) 0.86 - 1.14   Results for orders placed or performed in visit on 09/15/20   Renal panel (Alb, BUN, Ca, Cl, CO2, Creat, Gluc, Phos, K, Na)     Status: Abnormal   Result Value Ref Range    Sodium 138 133 - 144 mmol/L    Potassium 3.8 3.4 - 5.3 mmol/L    Chloride 107 94 - 109 mmol/L    Carbon Dioxide 23 20 - 32 mmol/L    Anion Gap 8 3 - 14 mmol/L    Glucose 108 (H) 70 - 99 mg/dL    Urea Nitrogen 43 (H) 7 - 30 mg/dL    Creatinine 2.21 (H) 0.52 - 1.04 mg/dL    GFR Estimate 20 (L) >60 mL/min/[1.73_m2]    GFR Estimate If Black 23 (L) >60 mL/min/[1.73_m2]    Calcium 9.3 8.5 - 10.1 mg/dL    Phosphorus 3.4 2.5 - 4.5 mg/dL    Albumin 3.8 3.4 - 5.0 g/dL   Uric acid     Status: None   Result Value Ref Range    Uric Acid 5.8 2.6 - 6.0 mg/dL   CBC with platelets and differential     Status: Abnormal   Result Value Ref Range    WBC 7.0 4.0 - 11.0 10e9/L    RBC Count 3.64 (L) 3.8 - 5.2 10e12/L    Hemoglobin 11.3 (L) 11.7 - 15.7 g/dL    Hematocrit 35.5 35.0 - 47.0 %    MCV 98 78 - 100 fl    MCH 31.0 26.5 - 33.0 pg    MCHC 31.8 31.5 - 36.5 g/dL    RDW 13.5 10.0 - 15.0 %    Platelet Count 241 150 - 450 10e9/L    % Neutrophils 67.9 %    % Lymphocytes 17.4 %    % Monocytes 9.8 %    % Eosinophils 4.3 %    % Basophils 0.6 %    Absolute Neutrophil 4.7 1.6 - 8.3 10e9/L    Absolute Lymphocytes 1.2 0.8 - 5.3 10e9/L    Absolute Monocytes 0.7 0.0 - 1.3 10e9/L    Absolute  Eosinophils 0.3 0.0 - 0.7 10e9/L    Absolute Basophils 0.0 0.0 - 0.2 10e9/L    Diff Method Automated Method    TSH with free T4 reflex     Status: None   Result Value Ref Range    TSH 2.44 0.40 - 4.00 mU/L     My chart message sent.              Your lab results are normal or stable,including the liver,kidney, thyroid, uric acid, glucose, and the hemoglobin.

## 2020-09-16 LAB
ALBUMIN SERPL-MCNC: 3.8 G/DL (ref 3.4–5)
ANION GAP SERPL CALCULATED.3IONS-SCNC: 8 MMOL/L (ref 3–14)
BUN SERPL-MCNC: 43 MG/DL (ref 7–30)
CALCIUM SERPL-MCNC: 9.3 MG/DL (ref 8.5–10.1)
CHLORIDE SERPL-SCNC: 107 MMOL/L (ref 94–109)
CO2 SERPL-SCNC: 23 MMOL/L (ref 20–32)
CREAT SERPL-MCNC: 2.21 MG/DL (ref 0.52–1.04)
GFR SERPL CREATININE-BSD FRML MDRD: 20 ML/MIN/{1.73_M2}
GLUCOSE SERPL-MCNC: 108 MG/DL (ref 70–99)
PHOSPHATE SERPL-MCNC: 3.4 MG/DL (ref 2.5–4.5)
POTASSIUM SERPL-SCNC: 3.8 MMOL/L (ref 3.4–5.3)
SODIUM SERPL-SCNC: 138 MMOL/L (ref 133–144)
TSH SERPL DL<=0.005 MIU/L-ACNC: 2.44 MU/L (ref 0.4–4)
URATE SERPL-MCNC: 5.8 MG/DL (ref 2.6–6)

## 2020-09-18 LAB — COPATH REPORT: NORMAL

## 2020-09-20 LAB
BACTERIA SPEC CULT: NO GROWTH
SPECIMEN SOURCE: NORMAL

## 2020-09-22 DIAGNOSIS — C34.90 LUNG CANCER (H): Primary | ICD-10-CM

## 2020-09-22 LAB
BACTERIA SPEC CULT: NORMAL
Lab: NORMAL
SPECIMEN SOURCE: NORMAL

## 2020-09-22 PROCEDURE — 81445 SO NEO GSAP 5-50DNA/DNA&RNA: CPT | Performed by: THORACIC SURGERY (CARDIOTHORACIC VASCULAR SURGERY)

## 2020-09-24 LAB
COPATH REPORT: NORMAL
COPATH REPORT: NORMAL

## 2020-09-28 ENCOUNTER — TRANSFERRED RECORDS (OUTPATIENT)
Dept: HEALTH INFORMATION MANAGEMENT | Facility: CLINIC | Age: 80
End: 2020-09-28

## 2020-09-29 DIAGNOSIS — B02.9 HERPES ZOSTER WITHOUT COMPLICATION: ICD-10-CM

## 2020-09-30 RX ORDER — FAMCICLOVIR 500 MG/1
500 TABLET ORAL 2 TIMES DAILY
Qty: 14 TABLET | Refills: 3 | OUTPATIENT
Start: 2020-09-30

## 2020-09-30 NOTE — TELEPHONE ENCOUNTER
Please advise- was this supposed to be a short term medication?  Pharmacy requesting 90 day supply

## 2020-10-14 ENCOUNTER — TRANSFERRED RECORDS (OUTPATIENT)
Dept: HEALTH INFORMATION MANAGEMENT | Facility: CLINIC | Age: 80
End: 2020-10-14

## 2020-10-15 ENCOUNTER — MYC REFILL (OUTPATIENT)
Dept: FAMILY MEDICINE | Facility: CLINIC | Age: 80
End: 2020-10-15

## 2020-10-15 DIAGNOSIS — B02.9 HERPES ZOSTER WITHOUT COMPLICATION: ICD-10-CM

## 2020-10-16 RX ORDER — FAMCICLOVIR 500 MG/1
500 TABLET ORAL 2 TIMES DAILY
Qty: 14 TABLET | Refills: 3 | Status: SHIPPED | OUTPATIENT
Start: 2020-10-16

## 2020-10-19 ENCOUNTER — TRANSFERRED RECORDS (OUTPATIENT)
Dept: HEALTH INFORMATION MANAGEMENT | Facility: CLINIC | Age: 80
End: 2020-10-19

## 2020-10-29 ENCOUNTER — EXTERNAL ORDER RESULTS (OUTPATIENT)
Dept: TRANSPLANT | Facility: CLINIC | Age: 80
End: 2020-10-29

## 2020-10-29 ENCOUNTER — TRANSFERRED RECORDS (OUTPATIENT)
Dept: HEALTH INFORMATION MANAGEMENT | Facility: CLINIC | Age: 80
End: 2020-10-29

## 2020-10-29 LAB
ALT SERPL-CCNC: 12 U/L (ref 9–52)
AST SERPL-CCNC: 11 U/L (ref 14–36)
CREAT SERPL-MCNC: 2.1 MG/DL (ref 0.5–1)
GLUCOSE SERPL-MCNC: 140 MG/DL (ref 74–106)
POTASSIUM SERPL-SCNC: 3.7 MMOL/L (ref 3.4–5.1)

## 2020-10-30 ENCOUNTER — TRANSFERRED RECORDS (OUTPATIENT)
Dept: HEALTH INFORMATION MANAGEMENT | Facility: CLINIC | Age: 80
End: 2020-10-30

## 2020-10-31 ENCOUNTER — TRANSFERRED RECORDS (OUTPATIENT)
Dept: HEALTH INFORMATION MANAGEMENT | Facility: CLINIC | Age: 80
End: 2020-10-31

## 2020-10-31 LAB
ALT SERPL-CCNC: 18 U/L (ref 9–52)
CREAT SERPL-MCNC: 2 MG/DL (ref 0.5–1)
EJECTION FRACTION: 60 %
GLUCOSE SERPL-MCNC: 268 MG/DL (ref 74–106)
POTASSIUM SERPL-SCNC: 3.1 MMOL/L (ref 3.4–5.1)

## 2020-11-02 ENCOUNTER — TRANSFERRED RECORDS (OUTPATIENT)
Dept: HEALTH INFORMATION MANAGEMENT | Facility: CLINIC | Age: 80
End: 2020-11-02

## 2020-11-03 ENCOUNTER — TRANSFERRED RECORDS (OUTPATIENT)
Dept: HEALTH INFORMATION MANAGEMENT | Facility: CLINIC | Age: 80
End: 2020-11-03

## 2020-11-06 LAB
CREAT SERPL-MCNC: 2.6 MG/DL (ref 0.5–1)
GLUCOSE SERPL-MCNC: 179 MG/DL (ref 74–106)
POTASSIUM SERPL-SCNC: 3.2 MMOL/L (ref 3.4–5.1)

## 2020-11-07 LAB
ALT SERPL-CCNC: 29 U/L (ref 9–52)
CREAT SERPL-MCNC: 1.9 MG/DL (ref 0.5–1)
GLUCOSE SERPL-MCNC: 176 MG/DL (ref 74–106)
POTASSIUM SERPL-SCNC: 3.8 MMOL/L (ref 3.4–5.1)

## 2020-11-08 LAB
CREAT SERPL-MCNC: 1.7 MG/DL (ref 0.5–1)
GLUCOSE SERPL-MCNC: 110 MG/DL (ref 74–106)
POTASSIUM SERPL-SCNC: 3.8 MMOL/L (ref 3.4–5.1)

## 2020-11-09 ENCOUNTER — TRANSFERRED RECORDS (OUTPATIENT)
Dept: HEALTH INFORMATION MANAGEMENT | Facility: CLINIC | Age: 80
End: 2020-11-09

## 2020-11-09 LAB
CREAT SERPL-MCNC: 1.5 MG/DL (ref 0.5–1)
GLUCOSE SERPL-MCNC: 78 MG/DL (ref 74–106)
POTASSIUM SERPL-SCNC: 3.8 MMOL/L (ref 3.4–5.1)

## 2020-11-13 ENCOUNTER — TRANSFERRED RECORDS (OUTPATIENT)
Dept: HEALTH INFORMATION MANAGEMENT | Facility: CLINIC | Age: 80
End: 2020-11-13

## 2020-11-14 LAB
CREAT SERPL-MCNC: 1.2 MG/DL (ref 0.5–1)
GLUCOSE SERPL-MCNC: 91 MG/DL (ref 74–106)
POTASSIUM SERPL-SCNC: 4.5 MMOL/L (ref 3.4–5.1)

## 2020-11-15 LAB
CREAT SERPL-MCNC: 1.2 MG/DL (ref 0.5–1)
GLUCOSE SERPL-MCNC: 72 MG/DL (ref 74–106)
POTASSIUM SERPL-SCNC: 4.5 MMOL/L (ref 3.4–5.1)

## 2020-11-16 LAB
CREAT SERPL-MCNC: 1.1 MG/DL (ref 0.5–1)
GLUCOSE SERPL-MCNC: 72 MG/DL (ref 74–106)
GLUCOSE SERPL-MCNC: NORMAL MG/DL (ref 70–99)
POTASSIUM SERPL-SCNC: 4.6 MMOL/L (ref 3.4–5.1)

## 2020-11-17 ENCOUNTER — TRANSFERRED RECORDS (OUTPATIENT)
Dept: HEALTH INFORMATION MANAGEMENT | Facility: CLINIC | Age: 80
End: 2020-11-17

## 2020-11-18 ENCOUNTER — TRANSFERRED RECORDS (OUTPATIENT)
Dept: HEALTH INFORMATION MANAGEMENT | Facility: CLINIC | Age: 80
End: 2020-11-18

## 2020-11-23 DIAGNOSIS — E78.5 HYPERLIPIDEMIA LDL GOAL <100: ICD-10-CM

## 2020-11-23 RX ORDER — SIMVASTATIN 40 MG
TABLET ORAL
Qty: 90 TABLET | Refills: 1 | Status: SHIPPED | OUTPATIENT
Start: 2020-11-23

## 2020-12-14 NOTE — PROGRESS NOTES
Order(s) created erroneously. Erroneous order ID: [id number]   Order canceled by: MALI HARRIS   Order cancel date/time: 12/14/2020 11:57 AM

## 2020-12-28 DIAGNOSIS — G89.4 CHRONIC PAIN SYNDROME: ICD-10-CM

## 2020-12-29 DIAGNOSIS — I73.9 PERIPHERAL VASCULAR DISEASE (H): ICD-10-CM

## 2020-12-29 RX ORDER — TIZANIDINE 2 MG/1
TABLET ORAL
Qty: 270 TABLET | Refills: 3 | Status: SHIPPED | OUTPATIENT
Start: 2020-12-29

## 2020-12-29 RX ORDER — CLOPIDOGREL BISULFATE 75 MG/1
75 TABLET ORAL DAILY
Qty: 90 TABLET | Refills: 3 | Status: SHIPPED | OUTPATIENT
Start: 2020-12-29

## 2021-01-15 ENCOUNTER — TRANSFERRED RECORDS (OUTPATIENT)
Dept: HEALTH INFORMATION MANAGEMENT | Facility: CLINIC | Age: 81
End: 2021-01-15

## 2021-01-22 ENCOUNTER — TRANSFERRED RECORDS (OUTPATIENT)
Dept: HEALTH INFORMATION MANAGEMENT | Facility: CLINIC | Age: 81
End: 2021-01-22

## 2021-02-05 ENCOUNTER — TRANSFERRED RECORDS (OUTPATIENT)
Dept: HEALTH INFORMATION MANAGEMENT | Facility: CLINIC | Age: 81
End: 2021-02-05

## 2021-02-16 NOTE — PROGRESS NOTES
Order(s) created erroneously. Erroneous order ID: [id number]   Order canceled by: MARISABEL ROY   Order cancel date/time: 02/16/2021 2:55 PM

## 2021-02-16 NOTE — PROGRESS NOTES
Order(s) created erroneously. Erroneous order ID: [id number]   Order canceled by: MARISABEL ROY   Order cancel date/time: 02/16/2021 2:55 PM

## 2021-02-26 ENCOUNTER — TRANSFERRED RECORDS (OUTPATIENT)
Dept: HEALTH INFORMATION MANAGEMENT | Facility: CLINIC | Age: 81
End: 2021-02-26

## 2021-10-23 ENCOUNTER — HEALTH MAINTENANCE LETTER (OUTPATIENT)
Age: 81
End: 2021-10-23

## 2022-10-10 ENCOUNTER — HEALTH MAINTENANCE LETTER (OUTPATIENT)
Age: 82
End: 2022-10-10

## 2022-11-27 ENCOUNTER — HEALTH MAINTENANCE LETTER (OUTPATIENT)
Age: 82
End: 2022-11-27

## 2023-08-31 NOTE — PLAN OF CARE
Problem: Patient Care Overview  Goal: Plan of Care/Patient Progress Review  Outcome: Improving  Pt is alert/oriented x4, complains of 6-7/10 pain, managed well with prn pain medications. VSS with one dose hydralazine given for  systolic, BP WNL 1 hour after admin. Tele SB. R chest tube to sxn with output 20-30 ml every 2 hours, dressing changed d/t increased serosanguinous drainage, bacitracin applied. On-Q pump infusing, sensors taped, clamps open. Pt A x 1 when OOB. Tolerating PO. Plan to be off IMC at 0600. Will continue to monitor.        [de-identified] : 06/28/23 ECG: SR at 63 bpm, borderline low voltage in precordial leads, iRBBB, nonspecific Tw abnormality. Compared to prior 3/28/23, no significant change. 09/27/22 ECG: SR at 71 bpm, BETTE, negative Tw.  [de-identified] : \par  03/06/23 TTE: LVIDd 3.7 cm, LVEF 29% (segmental) with VTI 12 cm, concentric LVH (septum 0.9, PWT 1.2 cm), mild DD, normal RV size and function, normal LA, min MR, mild AS, mild-mod TR\par  \par  01/11/22 TTE: LVIDd 5.5 cm, LVEF 40-45% (segmental), mod DD, normal RV size and function, LA/RA not well visualized, min MR, mod TR, est RVSP 48 mmHg, trace pericardial effusion\par  \par  11/26/21 TTE: LVIDd 5.4 cm, LVEF 20% (regional variation), mild DD, normal RV size and function, normal biatria, min MR, mild-mod TR, est RVSP 37 mmHg\par  \par  12/12/08 TTE: LVIDd 4.7 cm, mod segmental LVSD, normal RV size and function, mod MR, est RVSP 34 mmHg\par   [de-identified] : \par  03/10/23 LHC: LM mild atherosclerosis, oLAD 40% stenosis, LAD mild atherosclerosis, pCx 50% stenosis, 1st OM 60% ISR, RCA minor luminal irregularities\par  \par  03/10/23 RHC: AO 95/60 (75), HR 93, RA 0, PA 25/9/14, PCWP 0, AO 98%, PA 70%, CO/CI (F) 4.83/2.96, SVR 1241 dsc, PVR 2.9 swanson\par  \par  06/18/12 LHC: 30% pLAD, 45% OM1 at site of prior stent, otherwise minor luminal irregularities of LM and RCA\par

## 2024-01-06 ENCOUNTER — HEALTH MAINTENANCE LETTER (OUTPATIENT)
Age: 84
End: 2024-01-06

## (undated) DEVICE — SU VICRYL 1 CTX CR 8X18" J765D

## (undated) DEVICE — SU SILK 2-0 TIE 24" SA75H

## (undated) DEVICE — DRSG TELFA 2X3"

## (undated) DEVICE — BLADE KNIFE SURG 15 371115

## (undated) DEVICE — DRSG STERI STRIP 1/2X4" R1547

## (undated) DEVICE — LINEN TOWEL PACK X5 5464

## (undated) DEVICE — DRSG KERLIX 4 1/2"X4YDS ROLL 6715

## (undated) DEVICE — SOL NACL 0.9% IRRIG 1000ML BOTTLE 07138-09

## (undated) DEVICE — DRAPE POUCH INSTRUMENT 1018

## (undated) DEVICE — SU PROLENE 3-0 V-7DA 36" 8976H

## (undated) DEVICE — DRSG GAUZE 4X4" 3033

## (undated) DEVICE — SYR BULB IRRIG 50ML LATEX FREE 0035280

## (undated) DEVICE — SU VICRYL 2-0 CT-1 27" J339H

## (undated) DEVICE — DRAPE IOBAN INCISE 23X17" 6650EZ

## (undated) DEVICE — SUCTION DRY CHEST DRAIN OASIS 3600-100

## (undated) DEVICE — GOWN IMPERVIOUS ZONED LG

## (undated) DEVICE — SU VICRYL 4-0 PS-2 18" UND J496H

## (undated) DEVICE — SOL WATER IRRIG 1000ML BOTTLE 2F7114

## (undated) DEVICE — TAPE DRSG UNIVERSAL CLOTH 3" WHITE LATEX 881-3

## (undated) DEVICE — SU NDL CUT REV MED 3/8 209014

## (undated) DEVICE — PACK MINOR SBA15MIFSE

## (undated) DEVICE — TUBE SMOKE EVAC 7/8"X10 (STERILE)

## (undated) DEVICE — TUBING SUCTION MEDI-VAC SOFT 3/16"X20' N520A

## (undated) DEVICE — ESU ELEC BLADE 6" COATED/INSULATED E1455-6

## (undated) DEVICE — ESU CORD MONOPOLAR 10'  E0510

## (undated) DEVICE — STPL RELOAD REG/THK TISSUE ECHELON 60 X 3.8MM GOLD GST60D

## (undated) DEVICE — ENDO TROCAR THORACIC 10/12MM TT012

## (undated) DEVICE — GLOVE PROTEXIS W/NEU-THERA 6.5  2D73TE65

## (undated) DEVICE — DRAIN PENROSE 0.75"X18" LATEX FREE GR205

## (undated) DEVICE — STPL ENDO ARTICULATING 60MM EC60A

## (undated) DEVICE — LINEN GOWN OVERSIZE 5408

## (undated) DEVICE — CATH THORACIC 24FR STR SLICONE 14024

## (undated) DEVICE — SURGICEL HEMOSTAT 3X4" NUKNIT 1943

## (undated) DEVICE — SU VICRYL 2-0 CT-2 27" J333H

## (undated) DEVICE — ANTIFOG SOLUTION W/FOAM PAD 31142527

## (undated) DEVICE — BLADE KNIFE SURG 10 371110

## (undated) DEVICE — ESU GROUND PAD UNIVERSAL W/O CORD

## (undated) DEVICE — CLIP APPLIER 11" MED LIGACLIP MCM20

## (undated) DEVICE — GLOVE PROTEXIS W/NEU-THERA 7.5  2D73TE75

## (undated) DEVICE — SU SILK 2 REEL 60" SA8H

## (undated) DEVICE — SU PROLENE 4-0 V-7DA 36" 8975H

## (undated) DEVICE — SUCTION CANISTER MEDIVAC LINER 3000ML W/LID 65651-530

## (undated) DEVICE — DRAPE BREAST/CHEST 29420

## (undated) DEVICE — NDL 22GA 1.5"

## (undated) DEVICE — DRAIN CHEST TUBE 28FR STR 8028

## (undated) DEVICE — ADPT 5 IN 1 360

## (undated) DEVICE — PREP CHLORAPREP 26ML TINTED ORANGE  260815

## (undated) DEVICE — SU VICRYL 1 CT-2 27" J335H

## (undated) DEVICE — CATH ON-Q PAIN SILVER SKR 2.5" PM010-A

## (undated) DEVICE — SU VICRYL 1 CT 36" J959H

## (undated) DEVICE — CATH ADAPTOR CHEST TUBE HEPARIN COATED 3/16"X3/8"

## (undated) RX ORDER — PROPOFOL 10 MG/ML
INJECTION, EMULSION INTRAVENOUS
Status: DISPENSED
Start: 2018-10-02

## (undated) RX ORDER — HYDROMORPHONE HYDROCHLORIDE 1 MG/ML
INJECTION, SOLUTION INTRAMUSCULAR; INTRAVENOUS; SUBCUTANEOUS
Status: DISPENSED
Start: 2018-10-02

## (undated) RX ORDER — FENTANYL CITRATE 50 UG/ML
INJECTION, SOLUTION INTRAMUSCULAR; INTRAVENOUS
Status: DISPENSED
Start: 2018-10-16

## (undated) RX ORDER — LIDOCAINE HYDROCHLORIDE 20 MG/ML
INJECTION, SOLUTION EPIDURAL; INFILTRATION; INTRACAUDAL; PERINEURAL
Status: DISPENSED
Start: 2018-10-16

## (undated) RX ORDER — BUPIVACAINE HYDROCHLORIDE 5 MG/ML
INJECTION, SOLUTION EPIDURAL; INTRACAUDAL
Status: DISPENSED
Start: 2018-10-02

## (undated) RX ORDER — CEFAZOLIN SODIUM 2 G/100ML
INJECTION, SOLUTION INTRAVENOUS
Status: DISPENSED
Start: 2018-10-02

## (undated) RX ORDER — BUPIVACAINE HYDROCHLORIDE 5 MG/ML
INJECTION, SOLUTION EPIDURAL; INTRACAUDAL
Status: DISPENSED
Start: 2018-10-16

## (undated) RX ORDER — FENTANYL CITRATE 50 UG/ML
INJECTION, SOLUTION INTRAMUSCULAR; INTRAVENOUS
Status: DISPENSED
Start: 2018-10-02

## (undated) RX ORDER — PROPOFOL 10 MG/ML
INJECTION, EMULSION INTRAVENOUS
Status: DISPENSED
Start: 2018-10-16

## (undated) RX ORDER — LIDOCAINE HYDROCHLORIDE 20 MG/ML
INJECTION, SOLUTION EPIDURAL; INFILTRATION; INTRACAUDAL; PERINEURAL
Status: DISPENSED
Start: 2018-10-02

## (undated) RX ORDER — DEXAMETHASONE SODIUM PHOSPHATE 4 MG/ML
INJECTION, SOLUTION INTRA-ARTICULAR; INTRALESIONAL; INTRAMUSCULAR; INTRAVENOUS; SOFT TISSUE
Status: DISPENSED
Start: 2018-10-02

## (undated) RX ORDER — DEXAMETHASONE SODIUM PHOSPHATE 4 MG/ML
INJECTION, SOLUTION INTRA-ARTICULAR; INTRALESIONAL; INTRAMUSCULAR; INTRAVENOUS; SOFT TISSUE
Status: DISPENSED
Start: 2018-10-16

## (undated) RX ORDER — CEFAZOLIN SODIUM 2 G/100ML
INJECTION, SOLUTION INTRAVENOUS
Status: DISPENSED
Start: 2018-10-16

## (undated) RX ORDER — HYDROMORPHONE HYDROCHLORIDE 1 MG/ML
INJECTION, SOLUTION INTRAMUSCULAR; INTRAVENOUS; SUBCUTANEOUS
Status: DISPENSED
Start: 2018-10-16

## (undated) RX ORDER — ONDANSETRON 2 MG/ML
INJECTION INTRAMUSCULAR; INTRAVENOUS
Status: DISPENSED
Start: 2018-10-02

## (undated) RX ORDER — GLYCOPYRROLATE 0.2 MG/ML
INJECTION, SOLUTION INTRAMUSCULAR; INTRAVENOUS
Status: DISPENSED
Start: 2018-10-16

## (undated) RX ORDER — ONDANSETRON 2 MG/ML
INJECTION INTRAMUSCULAR; INTRAVENOUS
Status: DISPENSED
Start: 2018-10-16